# Patient Record
Sex: MALE | Race: WHITE | Employment: UNEMPLOYED | URBAN - METROPOLITAN AREA
[De-identification: names, ages, dates, MRNs, and addresses within clinical notes are randomized per-mention and may not be internally consistent; named-entity substitution may affect disease eponyms.]

---

## 2017-05-17 ENCOUNTER — HOSPITAL ENCOUNTER (EMERGENCY)
Age: 32
Discharge: HOME | End: 2017-05-18
Attending: EMERGENCY MEDICINE
Payer: MEDICAID

## 2017-05-17 ENCOUNTER — APPOINTMENT (OUTPATIENT)
Dept: CT IMAGING | Age: 32
End: 2017-05-17
Attending: EMERGENCY MEDICINE
Payer: MEDICAID

## 2017-05-17 DIAGNOSIS — N20.0 KIDNEY STONE: ICD-10-CM

## 2017-05-17 DIAGNOSIS — R10.9 LEFT FLANK PAIN: Primary | ICD-10-CM

## 2017-05-17 LAB
BASOPHILS # BLD AUTO: 0 K/UL (ref 0–0.2)
BASOPHILS # BLD: 0 % (ref 0–2)
DIFFERENTIAL METHOD BLD: ABNORMAL
EOSINOPHIL # BLD: 0.2 K/UL (ref 0–0.8)
EOSINOPHIL NFR BLD: 3 % (ref 0.5–7.8)
ERYTHROCYTE [DISTWIDTH] IN BLOOD BY AUTOMATED COUNT: 14.5 % (ref 11.9–14.6)
HCT VFR BLD AUTO: 41 % (ref 41.1–50.3)
HGB BLD-MCNC: 13.6 G/DL (ref 13.6–17.2)
IMM GRANULOCYTES # BLD: 0 K/UL (ref 0–0.5)
IMM GRANULOCYTES NFR BLD AUTO: 0.4 % (ref 0–5)
LYMPHOCYTES # BLD AUTO: 35 % (ref 13–44)
LYMPHOCYTES # BLD: 2.8 K/UL (ref 0.5–4.6)
MCH RBC QN AUTO: 25.9 PG (ref 26.1–32.9)
MCHC RBC AUTO-ENTMCNC: 33.2 G/DL (ref 31.4–35)
MCV RBC AUTO: 78.1 FL (ref 79.6–97.8)
MONOCYTES # BLD: 0.5 K/UL (ref 0.1–1.3)
MONOCYTES NFR BLD AUTO: 6 % (ref 4–12)
NEUTS SEG # BLD: 4.4 K/UL (ref 1.7–8.2)
NEUTS SEG NFR BLD AUTO: 56 % (ref 43–78)
PLATELET # BLD AUTO: 263 K/UL (ref 150–450)
PMV BLD AUTO: 9.9 FL (ref 10.8–14.1)
RBC # BLD AUTO: 5.25 M/UL (ref 4.23–5.67)
WBC # BLD AUTO: 7.9 K/UL (ref 4.3–11.1)

## 2017-05-17 PROCEDURE — 99284 EMERGENCY DEPT VISIT MOD MDM: CPT | Performed by: EMERGENCY MEDICINE

## 2017-05-17 PROCEDURE — 81003 URINALYSIS AUTO W/O SCOPE: CPT | Performed by: EMERGENCY MEDICINE

## 2017-05-17 PROCEDURE — 96375 TX/PRO/DX INJ NEW DRUG ADDON: CPT | Performed by: EMERGENCY MEDICINE

## 2017-05-17 PROCEDURE — 85025 COMPLETE CBC W/AUTO DIFF WBC: CPT | Performed by: EMERGENCY MEDICINE

## 2017-05-17 PROCEDURE — 80053 COMPREHEN METABOLIC PANEL: CPT | Performed by: EMERGENCY MEDICINE

## 2017-05-17 PROCEDURE — 74011250636 HC RX REV CODE- 250/636: Performed by: EMERGENCY MEDICINE

## 2017-05-17 PROCEDURE — 96374 THER/PROPH/DIAG INJ IV PUSH: CPT | Performed by: EMERGENCY MEDICINE

## 2017-05-17 PROCEDURE — 83690 ASSAY OF LIPASE: CPT | Performed by: EMERGENCY MEDICINE

## 2017-05-17 PROCEDURE — 74176 CT ABD & PELVIS W/O CONTRAST: CPT

## 2017-05-17 RX ORDER — METFORMIN HYDROCHLORIDE EXTENDED-RELEASE TABLETS 1000 MG/1
TABLET, FILM COATED, EXTENDED RELEASE ORAL 2 TIMES DAILY
COMMUNITY
End: 2019-07-21

## 2017-05-17 RX ORDER — KETOROLAC TROMETHAMINE 30 MG/ML
30 INJECTION, SOLUTION INTRAMUSCULAR; INTRAVENOUS
Status: COMPLETED | OUTPATIENT
Start: 2017-05-17 | End: 2017-05-17

## 2017-05-17 RX ADMIN — KETOROLAC TROMETHAMINE 30 MG: 30 INJECTION, SOLUTION INTRAMUSCULAR; INTRAVENOUS at 23:08

## 2017-05-17 RX ADMIN — SODIUM CHLORIDE 1000 ML: 900 INJECTION, SOLUTION INTRAVENOUS at 21:58

## 2017-05-17 NOTE — LETTER
[facility] EMERGENCY DEPT 
[address] [place] [id number] 
[phone] Work/School Note Date: 5/17/2017 To Whom It May concern: 
 
Ric Jeong was seen and treated today in the emergency room by the following provider(s): 
Attending Provider: Tila Garvin MD. Ric Jeong {Return to school/sport/work:5/19/2017 Sincerely, Christiano Lopez RN

## 2017-05-18 VITALS
HEART RATE: 87 BPM | TEMPERATURE: 98.6 F | BODY MASS INDEX: 42.26 KG/M2 | WEIGHT: 312 LBS | OXYGEN SATURATION: 98 % | RESPIRATION RATE: 16 BRPM | HEIGHT: 72 IN | SYSTOLIC BLOOD PRESSURE: 138 MMHG | DIASTOLIC BLOOD PRESSURE: 70 MMHG

## 2017-05-18 LAB
ALBUMIN SERPL BCP-MCNC: 2.9 G/DL (ref 3.5–5)
ALBUMIN/GLOB SERPL: 0.7 {RATIO} (ref 1.2–3.5)
ALP SERPL-CCNC: 127 U/L (ref 50–136)
ALT SERPL-CCNC: 60 U/L (ref 12–65)
ANION GAP BLD CALC-SCNC: 5 MMOL/L (ref 7–16)
AST SERPL W P-5'-P-CCNC: 80 U/L (ref 15–37)
BILIRUB SERPL-MCNC: 0.4 MG/DL (ref 0.2–1.1)
BUN SERPL-MCNC: 14 MG/DL (ref 6–23)
CALCIUM SERPL-MCNC: 8.3 MG/DL (ref 8.3–10.4)
CHLORIDE SERPL-SCNC: 102 MMOL/L (ref 98–107)
CO2 SERPL-SCNC: 27 MMOL/L (ref 21–32)
CREAT SERPL-MCNC: 0.93 MG/DL (ref 0.8–1.5)
GLOBULIN SER CALC-MCNC: 4.3 G/DL (ref 2.3–3.5)
GLUCOSE SERPL-MCNC: 317 MG/DL (ref 65–100)
LIPASE SERPL-CCNC: 110 U/L (ref 73–393)
POTASSIUM SERPL-SCNC: 5.8 MMOL/L (ref 3.5–5.1)
PROT SERPL-MCNC: 7.2 G/DL (ref 6.3–8.2)
SODIUM SERPL-SCNC: 134 MMOL/L (ref 136–145)

## 2017-05-18 RX ORDER — TRAMADOL HYDROCHLORIDE 50 MG/1
50 TABLET ORAL
Qty: 15 TAB | Refills: 0 | Status: SHIPPED | OUTPATIENT
Start: 2017-05-18 | End: 2017-05-26

## 2017-05-18 NOTE — ED TRIAGE NOTES
Pt presents to ER for pain to L flank x 1 week that went away over the weekend. Pain has returned w/ radiation to L groin, Hx of renal stones.

## 2017-05-18 NOTE — DISCHARGE INSTRUCTIONS

## 2017-05-18 NOTE — ED PROVIDER NOTES
HPI Comments: Patient is a 25-year-old male with history of ureteral lithiasis presenting with left flank pain with radiation to his left groin. States he's had the pain off and on for about a week but is currently acutely worse the last 2 days. Describes the pain as colicky. No reports of fevers or dysuria but he does mention that his urine is somewhat dark. Patient reports some nausea but no vomiting, fevers, shortness of breath or chest pain. Past surgical history consistent of appendectomy. Patient states he's been taking ibuprofen and Aleve at home with minimal relief. Patient is a 33 y.o. male presenting with flank pain. The history is provided by the patient. No  was used. Flank Pain    Pertinent negatives include no fever, no headaches, no abdominal pain and no dysuria. Past Medical History:   Diagnosis Date    Arrhythmia     \" artery in heart goes into spams\"  no meds    Diabetes ([facility] [address]     Morbid obesity (HCC)     Nausea & vomiting     Renal stone        Past Surgical History:   Procedure Laterality Date    HX ADENOIDECTOMY  age15    HX APPENDECTOMY  1996    HX ORTHOPAEDIC      left knee x 3     HX TONSILLECTOMY  age 10         No family history on file. Social History     Social History    Marital status:      Spouse name: N/A    Number of children: N/A    Years of education: N/A     Occupational History    Not on file. Social History Main Topics    Smoking status: Never Smoker    Smokeless tobacco: Never Used    Alcohol use Yes      Comment: occasional    Drug use: No    Sexual activity: Not on file     Other Topics Concern    Not on file     Social History Narrative         ALLERGIES: Phenergan [promethazine]    Review of Systems   Constitutional: Negative for fatigue and fever. HENT: Negative for sore throat. Eyes: Negative for pain. Respiratory: Negative for cough, chest tightness and shortness of breath.     Cardiovascular: Negative for leg swelling. Gastrointestinal: Negative for abdominal pain. Genitourinary: Positive for flank pain. Negative for dysuria. Musculoskeletal: Negative for back pain. Neurological: Negative for syncope and headaches. Vitals:    05/17/17 2001   BP: 142/72   Pulse: 93   Resp: 18   Temp: 98.6 °F (37 °C)   SpO2: 97%   Weight: 141.5 kg (312 lb)   Height: 6' (1.829 m)            Physical Exam   Constitutional: He is oriented to person, place, and time. He appears well-developed and well-nourished. No distress. HENT:   Head: Normocephalic and atraumatic. Eyes: Conjunctivae and EOM are normal. Pupils are equal, round, and reactive to light. Neck: Normal range of motion. Neck supple. Cardiovascular: Normal rate, regular rhythm and normal heart sounds. Pulmonary/Chest: Effort normal and breath sounds normal. No respiratory distress. He has no wheezes. He has no rales. Abdominal: Soft. He exhibits no distension. There is tenderness. There is no rebound. Tenderness to percussion in the left flank region. Musculoskeletal: Normal range of motion. He exhibits no edema or tenderness. Neurological: He is alert and oriented to person, place, and time. Skin: Skin is warm and dry. No rash noted. He is not diaphoretic. Psychiatric: He has a normal mood and affect. His behavior is normal.   Vitals reviewed. MDM  Number of Diagnoses or Management Options  Kidney stone: new and does not require workup  Left flank pain: new and does not require workup  Diagnosis management comments: Patient reports improvement after Toradol. No vomiting here in the ED. CT demonstrates nno ureterolithiasis. Patient has an 8 mm stone in his left kidney. Will discharge patient to home. Strongly suggested a follow-up with her PCP as soon as possible. Return to the ED for any new issues.        Amount and/or Complexity of Data Reviewed  Clinical lab tests: ordered and reviewed (Results for orders placed or performed during the hospital encounter of 05/17/17  -CBC WITH AUTOMATED DIFF       Result                                            Value                         Ref Range                       WBC                                               7.9                           4.3 - 11.1 K/uL                 RBC                                               5.25                          4.23 - 5.67 M/uL                HGB                                               13.6                          13.6 - 17.2 g/dL                HCT                                               41.0 (L)                      41.1 - 50.3 %                   MCV                                               78.1 (L)                      79.6 - 97.8 FL                  MCH                                               25.9 (L)                      26.1 - 32.9 PG                  MCHC                                              33.2                          31.4 - 35.0 g/dL                RDW                                               14.5                          11.9 - 14.6 %                   PLATELET                                          263                           150 - 450 K/uL                  MPV                                               9.9 (L)                       10.8 - 14.1 FL                  DF                                                AUTOMATED                                                     NEUTROPHILS                                       56                            43 - 78 %                       LYMPHOCYTES                                       35                            13 - 44 %                       MONOCYTES                                         6                             4.0 - 12.0 %                    EOSINOPHILS                                       3                             0.5 - 7.8 %                     BASOPHILS                                         0 0.0 - 2.0 %                     IMMATURE GRANULOCYTES                             0.4                           0.0 - 5.0 %                     ABS. NEUTROPHILS                                  4.4                           1.7 - 8.2 K/UL                  ABS. LYMPHOCYTES                                  2.8                           0.5 - 4.6 K/UL                  ABS. MONOCYTES                                    0.5                           0.1 - 1.3 K/UL                  ABS. EOSINOPHILS                                  0.2                           0.0 - 0.8 K/UL                  ABS. BASOPHILS                                    0.0                           0.0 - 0.2 K/UL                  ABS. IMM.  GRANS.                                  0.0                           0.0 - 0.5 K/UL             -LIPASE       Result                                            Value                         Ref Range                       Lipase                                            110                           73 - 393 U/L               -METABOLIC PANEL, COMPREHENSIVE       Result                                            Value                         Ref Range                       Sodium                                            134 (L)                       136 - 145 mmol/L                Potassium                                         5.8 (H)                       3.5 - 5.1 mmol/L                Chloride                                          102                           98 - 107 mmol/L                 CO2                                               27                            21 - 32 mmol/L                  Anion gap                                         5 (L)                         7 - 16 mmol/L                   Glucose                                           317 (H)                       65 - 100 mg/dL                  BUN                                               14 6 - 23 MG/DL                    Creatinine                                        0.93                          0.8 - 1.5 MG/DL                 GFR est AA                                        >60                           >60 ml/min/1.73m2               GFR est non-AA                                    >60                           >60 ml/min/1.73m2               Calcium                                           8.3                           8.3 - 10.4 MG/DL                Bilirubin, total                                  0.4                           0.2 - 1.1 MG/DL                 ALT (SGPT)                                        60                            12 - 65 U/L                     AST (SGOT)                                        80 (H)                        15 - 37 U/L                     Alk. phosphatase                                  127                           50 - 136 U/L                    Protein, total                                    7.2                           6.3 - 8.2 g/dL                  Albumin                                           2.9 (L)                       3.5 - 5.0 g/dL                  Globulin                                          4.3 (H)                       2.3 - 3.5 g/dL                  A-G Ratio                                         0.7 (L)                       1.2 - 3.5                  )  Tests in the radiology section of CPT®: ordered and reviewed (Ct Urogram Wo Cont    Result Date: 5/17/2017  Axial CT stone Exam: CT abdomen pelvis without contrast. The mA was adjusted using dose modulation to reduce patient radiation exposure. REASON: Left flank pain for one week, acutely worse today COMPARISON: CT abdomen pelvis from March 29, 2015 FINDINGS: CT abdomen: 8 mm nonobstructing stone in the mid left kidney. No right renal or bilateral ureteral calculi. No hydronephrosis or asymmetric perinephric stranding.  The lack of IV contrast limits the evaluation of solid abdominal organs, hollow viscera, and vascular structures. No gross abnormalities of solid abdominal organs. Abdominal aorta is nonaneurysmal. No abnormally dilated loops of bowel. CT pelvis: The urinary bladder contour is within normal limits. No free pelvic fluid. No acute osseous abnormality. IMPRESSION: 8 mm nonobstructing left renal stone.  No ureteral calculi or hydronephrosis.     )  Review and summarize past medical records: yes  Independent visualization of images, tracings, or specimens: yes    Risk of Complications, Morbidity, and/or Mortality  Presenting problems: moderate  Diagnostic procedures: moderate  Management options: moderate    Patient Progress  Patient progress: improved    ED Course       Procedures

## 2017-06-12 ENCOUNTER — HOSPITAL ENCOUNTER (OUTPATIENT)
Dept: CT IMAGING | Age: 32
Discharge: HOME | End: 2017-06-12
Attending: UROLOGY
Payer: COMMERCIAL

## 2017-06-12 ENCOUNTER — HOSPITAL ENCOUNTER (OUTPATIENT)
Dept: CT IMAGING | Age: 32
Discharge: HOME | End: 2017-06-12
Attending: UROLOGY

## 2017-06-12 DIAGNOSIS — N20.0 KIDNEY STONES: ICD-10-CM

## 2017-06-12 DIAGNOSIS — R10.9 LT FLANK PAIN: ICD-10-CM

## 2017-06-12 PROCEDURE — 74176 CT ABD & PELVIS W/O CONTRAST: CPT

## 2017-06-12 NOTE — PROGRESS NOTES
Please let pt know that stone has not moved. No clear  etiology for pain. If he would like to proceed with surgery let me know.

## 2017-06-13 ENCOUNTER — ANESTHESIA EVENT (OUTPATIENT)
Dept: SURGERY | Age: 32
End: 2017-06-13
Payer: COMMERCIAL

## 2017-06-13 ENCOUNTER — HOSPITAL ENCOUNTER (OUTPATIENT)
Dept: LAB | Age: 32
Discharge: HOME | End: 2017-06-13
Payer: COMMERCIAL

## 2017-06-13 LAB
ANION GAP BLD CALC-SCNC: 7 MMOL/L (ref 7–16)
APPEARANCE UR: CLEAR
BACTERIA URNS QL MICRO: 0 /HPF
BILIRUB UR QL: NEGATIVE
BUN SERPL-MCNC: 12 MG/DL (ref 6–23)
CALCIUM SERPL-MCNC: 9.2 MG/DL (ref 8.3–10.4)
CASTS URNS QL MICRO: ABNORMAL /LPF
CHLORIDE SERPL-SCNC: 96 MMOL/L (ref 98–107)
CO2 SERPL-SCNC: 28 MMOL/L (ref 21–32)
COLOR UR: YELLOW
CREAT SERPL-MCNC: 1.2 MG/DL (ref 0.8–1.5)
EPI CELLS #/AREA URNS HPF: ABNORMAL /HPF
ERYTHROCYTE [DISTWIDTH] IN BLOOD BY AUTOMATED COUNT: 14.2 % (ref 11.9–14.6)
GLUCOSE SERPL-MCNC: 546 MG/DL (ref 65–100)
GLUCOSE UR STRIP.AUTO-MCNC: >1000 MG/DL
HCT VFR BLD AUTO: 44 % (ref 41.1–50.3)
HGB BLD-MCNC: 14.8 G/DL (ref 13.6–17.2)
HGB UR QL STRIP: ABNORMAL
KETONES UR QL STRIP.AUTO: NEGATIVE MG/DL
LEUKOCYTE ESTERASE UR QL STRIP.AUTO: NEGATIVE
MCH RBC QN AUTO: 25.9 PG (ref 26.1–32.9)
MCHC RBC AUTO-ENTMCNC: 33.6 G/DL (ref 31.4–35)
MCV RBC AUTO: 77.1 FL (ref 79.6–97.8)
NITRITE UR QL STRIP.AUTO: NEGATIVE
PH UR STRIP: 5 [PH] (ref 5–9)
PLATELET # BLD AUTO: 250 K/UL (ref 150–450)
PMV BLD AUTO: 10.1 FL (ref 10.8–14.1)
POTASSIUM SERPL-SCNC: 4.2 MMOL/L (ref 3.5–5.1)
PROT UR STRIP-MCNC: NEGATIVE MG/DL
RBC # BLD AUTO: 5.71 M/UL (ref 4.23–5.67)
RBC #/AREA URNS HPF: ABNORMAL /HPF
SODIUM SERPL-SCNC: 131 MMOL/L (ref 136–145)
SP GR UR REFRACTOMETRY: 1.04 (ref 1–1.02)
UROBILINOGEN UR QL STRIP.AUTO: 0.2 EU/DL (ref 0.2–1)
WBC # BLD AUTO: 8 K/UL (ref 4.3–11.1)
WBC URNS QL MICRO: ABNORMAL /HPF

## 2017-06-13 PROCEDURE — 85027 COMPLETE CBC AUTOMATED: CPT | Performed by: UROLOGY

## 2017-06-13 PROCEDURE — 80048 BASIC METABOLIC PNL TOTAL CA: CPT | Performed by: UROLOGY

## 2017-06-13 PROCEDURE — 81001 URINALYSIS AUTO W/SCOPE: CPT | Performed by: UROLOGY

## 2017-06-13 PROCEDURE — 87086 URINE CULTURE/COLONY COUNT: CPT | Performed by: UROLOGY

## 2017-06-13 PROCEDURE — 36415 COLL VENOUS BLD VENIPUNCTURE: CPT | Performed by: UROLOGY

## 2017-06-14 ENCOUNTER — HOSPITAL ENCOUNTER (OUTPATIENT)
Age: 32
Setting detail: OUTPATIENT SURGERY
Discharge: HOME | End: 2017-06-14
Attending: UROLOGY | Admitting: UROLOGY
Payer: COMMERCIAL

## 2017-06-14 ENCOUNTER — ANESTHESIA (OUTPATIENT)
Dept: SURGERY | Age: 32
End: 2017-06-14
Payer: COMMERCIAL

## 2017-06-14 VITALS
HEART RATE: 67 BPM | HEIGHT: 72 IN | BODY MASS INDEX: 41.08 KG/M2 | WEIGHT: 303.31 LBS | OXYGEN SATURATION: 91 % | TEMPERATURE: 97.8 F | SYSTOLIC BLOOD PRESSURE: 140 MMHG | DIASTOLIC BLOOD PRESSURE: 82 MMHG | RESPIRATION RATE: 16 BRPM

## 2017-06-14 LAB
GLUCOSE BLD STRIP.AUTO-MCNC: 216 MG/DL (ref 65–100)
GLUCOSE BLD STRIP.AUTO-MCNC: 241 MG/DL (ref 65–100)

## 2017-06-14 PROCEDURE — 77030008477 HC STYL SATN SLP COVD -A: Performed by: ANESTHESIOLOGY

## 2017-06-14 PROCEDURE — 74011250636 HC RX REV CODE- 250/636

## 2017-06-14 PROCEDURE — 74011000250 HC RX REV CODE- 250

## 2017-06-14 PROCEDURE — 74011636320 HC RX REV CODE- 636/320: Performed by: UROLOGY

## 2017-06-14 PROCEDURE — 74011000250 HC RX REV CODE- 250: Performed by: ANESTHESIOLOGY

## 2017-06-14 PROCEDURE — 77030020143 HC AIRWY LARYN INTUB CGAS -A: Performed by: ANESTHESIOLOGY

## 2017-06-14 PROCEDURE — 74011250637 HC RX REV CODE- 250/637: Performed by: ANESTHESIOLOGY

## 2017-06-14 PROCEDURE — 74011250636 HC RX REV CODE- 250/636: Performed by: ANESTHESIOLOGY

## 2017-06-14 PROCEDURE — 77030035011 HC LSR FBR HOLM FLXIVA TRAC TIP BSC -F: Performed by: UROLOGY

## 2017-06-14 PROCEDURE — 82962 GLUCOSE BLOOD TEST: CPT

## 2017-06-14 PROCEDURE — 76210000026 HC REC RM PH II 1 TO 1.5 HR: Performed by: UROLOGY

## 2017-06-14 PROCEDURE — 76210000017 HC OR PH I REC 1.5 TO 2 HR: Performed by: UROLOGY

## 2017-06-14 PROCEDURE — 74011250636 HC RX REV CODE- 250/636: Performed by: UROLOGY

## 2017-06-14 PROCEDURE — C2617 STENT, NON-COR, TEM W/O DEL: HCPCS | Performed by: UROLOGY

## 2017-06-14 PROCEDURE — 77030032490 HC SLV COMPR SCD KNE COVD -B: Performed by: UROLOGY

## 2017-06-14 PROCEDURE — C1769 GUIDE WIRE: HCPCS | Performed by: UROLOGY

## 2017-06-14 PROCEDURE — C1894 INTRO/SHEATH, NON-LASER: HCPCS | Performed by: UROLOGY

## 2017-06-14 PROCEDURE — 77030018836 HC SOL IRR NACL ICUM -A: Performed by: UROLOGY

## 2017-06-14 PROCEDURE — 77030020782 HC GWN BAIR PAWS FLX 3M -B: Performed by: ANESTHESIOLOGY

## 2017-06-14 PROCEDURE — 77030018846 HC SOL IRR STRL H20 ICUM -A: Performed by: UROLOGY

## 2017-06-14 PROCEDURE — 77030009934 HC PRB IRR KT UTMD -B: Performed by: UROLOGY

## 2017-06-14 PROCEDURE — 76060000033 HC ANESTHESIA 1 TO 1.5 HR: Performed by: UROLOGY

## 2017-06-14 PROCEDURE — 74011636637 HC RX REV CODE- 636/637: Performed by: ANESTHESIOLOGY

## 2017-06-14 PROCEDURE — 77030008703 HC TU ET UNCUF COVD -A: Performed by: ANESTHESIOLOGY

## 2017-06-14 PROCEDURE — 76010000138 HC OR TIME 0.5 TO 1 HR: Performed by: UROLOGY

## 2017-06-14 PROCEDURE — 77030019927 HC TBNG IRR CYSTO BAXT -A: Performed by: UROLOGY

## 2017-06-14 DEVICE — URETERAL STENT
Type: IMPLANTABLE DEVICE | Site: KIDNEY | Status: FUNCTIONAL
Brand: PERCUFLEX™ PLUS

## 2017-06-14 RX ORDER — SUCCINYLCHOLINE CHLORIDE 20 MG/ML
INJECTION INTRAMUSCULAR; INTRAVENOUS AS NEEDED
Status: DISCONTINUED | OUTPATIENT
Start: 2017-06-14 | End: 2017-06-14 | Stop reason: HOSPADM

## 2017-06-14 RX ORDER — LIDOCAINE HYDROCHLORIDE 10 MG/ML
0.1 INJECTION INFILTRATION; PERINEURAL AS NEEDED
Status: DISCONTINUED | OUTPATIENT
Start: 2017-06-14 | End: 2017-06-14 | Stop reason: HOSPADM

## 2017-06-14 RX ORDER — ROCURONIUM BROMIDE 10 MG/ML
INJECTION, SOLUTION INTRAVENOUS AS NEEDED
Status: DISCONTINUED | OUTPATIENT
Start: 2017-06-14 | End: 2017-06-14 | Stop reason: HOSPADM

## 2017-06-14 RX ORDER — SODIUM CHLORIDE 0.9 % (FLUSH) 0.9 %
5-10 SYRINGE (ML) INJECTION AS NEEDED
Status: DISCONTINUED | OUTPATIENT
Start: 2017-06-14 | End: 2017-06-14 | Stop reason: HOSPADM

## 2017-06-14 RX ORDER — HYDROMORPHONE HYDROCHLORIDE 2 MG/ML
0.5 INJECTION, SOLUTION INTRAMUSCULAR; INTRAVENOUS; SUBCUTANEOUS
Status: DISCONTINUED | OUTPATIENT
Start: 2017-06-14 | End: 2017-06-14 | Stop reason: HOSPADM

## 2017-06-14 RX ORDER — LIDOCAINE HYDROCHLORIDE 20 MG/ML
INJECTION, SOLUTION EPIDURAL; INFILTRATION; INTRACAUDAL; PERINEURAL AS NEEDED
Status: DISCONTINUED | OUTPATIENT
Start: 2017-06-14 | End: 2017-06-14 | Stop reason: HOSPADM

## 2017-06-14 RX ORDER — SODIUM CHLORIDE, SODIUM LACTATE, POTASSIUM CHLORIDE, CALCIUM CHLORIDE 600; 310; 30; 20 MG/100ML; MG/100ML; MG/100ML; MG/100ML
100 INJECTION, SOLUTION INTRAVENOUS CONTINUOUS
Status: DISCONTINUED | OUTPATIENT
Start: 2017-06-14 | End: 2017-06-14 | Stop reason: HOSPADM

## 2017-06-14 RX ORDER — ONDANSETRON 2 MG/ML
INJECTION INTRAMUSCULAR; INTRAVENOUS AS NEEDED
Status: DISCONTINUED | OUTPATIENT
Start: 2017-06-14 | End: 2017-06-14 | Stop reason: HOSPADM

## 2017-06-14 RX ORDER — NALOXONE HYDROCHLORIDE 0.4 MG/ML
0.1 INJECTION, SOLUTION INTRAMUSCULAR; INTRAVENOUS; SUBCUTANEOUS
Status: DISCONTINUED | OUTPATIENT
Start: 2017-06-14 | End: 2017-06-14 | Stop reason: HOSPADM

## 2017-06-14 RX ORDER — HYDROCODONE BITARTRATE AND ACETAMINOPHEN 7.5; 325 MG/1; MG/1
1 TABLET ORAL
Qty: 20 TAB | Refills: 0 | Status: SHIPPED | OUTPATIENT
Start: 2017-06-14 | End: 2017-06-23

## 2017-06-14 RX ORDER — INSULIN LISPRO 100 [IU]/ML
2 INJECTION, SOLUTION INTRAVENOUS; SUBCUTANEOUS ONCE
Status: COMPLETED | OUTPATIENT
Start: 2017-06-14 | End: 2017-06-14

## 2017-06-14 RX ORDER — MIDAZOLAM HYDROCHLORIDE 1 MG/ML
2 INJECTION, SOLUTION INTRAMUSCULAR; INTRAVENOUS
Status: DISCONTINUED | OUTPATIENT
Start: 2017-06-14 | End: 2017-06-14 | Stop reason: HOSPADM

## 2017-06-14 RX ORDER — FENTANYL CITRATE 50 UG/ML
INJECTION, SOLUTION INTRAMUSCULAR; INTRAVENOUS AS NEEDED
Status: DISCONTINUED | OUTPATIENT
Start: 2017-06-14 | End: 2017-06-14 | Stop reason: HOSPADM

## 2017-06-14 RX ORDER — ONDANSETRON 2 MG/ML
4 INJECTION INTRAMUSCULAR; INTRAVENOUS
Status: DISCONTINUED | OUTPATIENT
Start: 2017-06-14 | End: 2017-06-14 | Stop reason: HOSPADM

## 2017-06-14 RX ORDER — PHENAZOPYRIDINE HYDROCHLORIDE 95 MG/1
190 TABLET ORAL ONCE
Status: COMPLETED | OUTPATIENT
Start: 2017-06-14 | End: 2017-06-14

## 2017-06-14 RX ORDER — DEXAMETHASONE SODIUM PHOSPHATE 4 MG/ML
INJECTION, SOLUTION INTRA-ARTICULAR; INTRALESIONAL; INTRAMUSCULAR; INTRAVENOUS; SOFT TISSUE AS NEEDED
Status: DISCONTINUED | OUTPATIENT
Start: 2017-06-14 | End: 2017-06-14 | Stop reason: HOSPADM

## 2017-06-14 RX ORDER — SODIUM CHLORIDE 0.9 % (FLUSH) 0.9 %
5-10 SYRINGE (ML) INJECTION EVERY 8 HOURS
Status: DISCONTINUED | OUTPATIENT
Start: 2017-06-14 | End: 2017-06-14 | Stop reason: HOSPADM

## 2017-06-14 RX ORDER — NALBUPHINE HYDROCHLORIDE 20 MG/ML
5 INJECTION, SOLUTION INTRAMUSCULAR; INTRAVENOUS; SUBCUTANEOUS
Status: DISCONTINUED | OUTPATIENT
Start: 2017-06-14 | End: 2017-06-14 | Stop reason: HOSPADM

## 2017-06-14 RX ORDER — KETOROLAC TROMETHAMINE 30 MG/ML
30 INJECTION, SOLUTION INTRAMUSCULAR; INTRAVENOUS ONCE
Status: COMPLETED | OUTPATIENT
Start: 2017-06-14 | End: 2017-06-14

## 2017-06-14 RX ORDER — PROPOFOL 10 MG/ML
INJECTION, EMULSION INTRAVENOUS AS NEEDED
Status: DISCONTINUED | OUTPATIENT
Start: 2017-06-14 | End: 2017-06-14 | Stop reason: HOSPADM

## 2017-06-14 RX ORDER — OXYCODONE HYDROCHLORIDE 5 MG/1
5 TABLET ORAL
Status: COMPLETED | OUTPATIENT
Start: 2017-06-14 | End: 2017-06-14

## 2017-06-14 RX ORDER — CIPROFLOXACIN 2 MG/ML
400 INJECTION, SOLUTION INTRAVENOUS ONCE
Status: COMPLETED | OUTPATIENT
Start: 2017-06-14 | End: 2017-06-14

## 2017-06-14 RX ORDER — CIPROFLOXACIN 500 MG/1
500 TABLET ORAL 2 TIMES DAILY
Qty: 6 TAB | Refills: 0 | Status: SHIPPED | OUTPATIENT
Start: 2017-06-14 | End: 2017-06-17

## 2017-06-14 RX ORDER — FLUMAZENIL 0.1 MG/ML
0.2 INJECTION INTRAVENOUS
Status: DISCONTINUED | OUTPATIENT
Start: 2017-06-14 | End: 2017-06-14 | Stop reason: HOSPADM

## 2017-06-14 RX ADMIN — HYDROMORPHONE HYDROCHLORIDE 0.5 MG: 2 INJECTION, SOLUTION INTRAMUSCULAR; INTRAVENOUS; SUBCUTANEOUS at 14:23

## 2017-06-14 RX ADMIN — FENTANYL CITRATE 50 MCG: 50 INJECTION, SOLUTION INTRAMUSCULAR; INTRAVENOUS at 13:51

## 2017-06-14 RX ADMIN — SODIUM CHLORIDE, SODIUM LACTATE, POTASSIUM CHLORIDE, AND CALCIUM CHLORIDE: 600; 310; 30; 20 INJECTION, SOLUTION INTRAVENOUS at 13:00

## 2017-06-14 RX ADMIN — SUCCINYLCHOLINE CHLORIDE 160 MG: 20 INJECTION INTRAMUSCULAR; INTRAVENOUS at 13:06

## 2017-06-14 RX ADMIN — ROCURONIUM BROMIDE 5 MG: 10 INJECTION, SOLUTION INTRAVENOUS at 13:06

## 2017-06-14 RX ADMIN — CIPROFLOXACIN 400 MG: 2 INJECTION, SOLUTION INTRAVENOUS at 13:10

## 2017-06-14 RX ADMIN — OXYCODONE HYDROCHLORIDE 5 MG: 5 TABLET ORAL at 15:55

## 2017-06-14 RX ADMIN — SODIUM CHLORIDE, SODIUM LACTATE, POTASSIUM CHLORIDE, AND CALCIUM CHLORIDE 100 ML/HR: 600; 310; 30; 20 INJECTION, SOLUTION INTRAVENOUS at 11:23

## 2017-06-14 RX ADMIN — MIDAZOLAM HYDROCHLORIDE 2 MG: 1 INJECTION, SOLUTION INTRAMUSCULAR; INTRAVENOUS at 12:35

## 2017-06-14 RX ADMIN — DEXAMETHASONE SODIUM PHOSPHATE 4 MG: 4 INJECTION, SOLUTION INTRA-ARTICULAR; INTRALESIONAL; INTRAMUSCULAR; INTRAVENOUS; SOFT TISSUE at 13:16

## 2017-06-14 RX ADMIN — HYDROMORPHONE HYDROCHLORIDE 0.5 MG: 2 INJECTION, SOLUTION INTRAMUSCULAR; INTRAVENOUS; SUBCUTANEOUS at 14:32

## 2017-06-14 RX ADMIN — KETOROLAC TROMETHAMINE 30 MG: 30 INJECTION, SOLUTION INTRAMUSCULAR at 16:14

## 2017-06-14 RX ADMIN — URINARY PAIN RELIEF 190 MG: 95 TABLET ORAL at 14:54

## 2017-06-14 RX ADMIN — FAMOTIDINE 20 MG: 10 INJECTION, SOLUTION INTRAVENOUS at 11:35

## 2017-06-14 RX ADMIN — ONDANSETRON 4 MG: 2 INJECTION INTRAMUSCULAR; INTRAVENOUS at 13:40

## 2017-06-14 RX ADMIN — PROPOFOL 200 MG: 10 INJECTION, EMULSION INTRAVENOUS at 13:06

## 2017-06-14 RX ADMIN — INSULIN LISPRO 2 UNITS: 100 INJECTION, SOLUTION INTRAVENOUS; SUBCUTANEOUS at 11:32

## 2017-06-14 RX ADMIN — FENTANYL CITRATE 100 MCG: 50 INJECTION, SOLUTION INTRAMUSCULAR; INTRAVENOUS at 13:06

## 2017-06-14 RX ADMIN — FENTANYL CITRATE 50 MCG: 50 INJECTION, SOLUTION INTRAMUSCULAR; INTRAVENOUS at 13:33

## 2017-06-14 RX ADMIN — HYDROMORPHONE HYDROCHLORIDE 0.5 MG: 2 INJECTION, SOLUTION INTRAMUSCULAR; INTRAVENOUS; SUBCUTANEOUS at 14:42

## 2017-06-14 RX ADMIN — LIDOCAINE HYDROCHLORIDE 60 MG: 20 INJECTION, SOLUTION EPIDURAL; INFILTRATION; INTRACAUDAL; PERINEURAL at 13:06

## 2017-06-14 RX ADMIN — INSULIN HUMAN 2 UNITS: 100 INJECTION, SOLUTION PARENTERAL at 11:32

## 2017-06-14 NOTE — BRIEF OP NOTE
BRIEF OPERATIVE NOTE    Date of Procedure: 6/14/2017   Preoperative Diagnosis: L renal stone  Postoperative Diagnosis: L renal stones  Procedure(s):  CYSTOSCOPY LEFT URETEROSCOPY/LASER LITHO/STENT/LEFT RPG  Surgeon(s) and Role:     * James Monge DO - Primary         Assistant Staff:       Surgical Staff:  Circ-1: Moshe Brink RN  Event Time In   Incision Start 1320   Incision Close 1345     Anesthesia: General   Estimated Blood Loss: Nil  Specimens: * No specimens in log *   Findings: see op note  Complications: none immediate  Implants: * No implants in log *

## 2017-06-14 NOTE — OP NOTES
[facility]   OPERATIVE REPORT       Name:  Ana Shetty   MR#:  [id number]   :  1985   Account #:  [de-identified]   Date of Adm:  2017       PREOPERATIVE DIAGNOSES: Left renal stone and left flank pain. POSTOPERATIVE DIAGNOSES: Left renal stones and left flank pain. PROCEDURE:   1. Cystoscopy. 2. Left ureteroscopy. 3. Laser lithotripsy. 4. Left retrograde pyelogram.   5. Left ureteral stent placement. SURGEON: Jennifer Ibrahim DO    ANESTHESIA: General.      CLINICAL HISTORY: This is a 29-year-old gentleman who reports a   recent history of left flank pain with radiation to the left   groin. A CT on 2017 showed an 8 mm left mid pole stone. He   recently called the office IN recurrent pain and a CT scan was   repeated showing similar findings. He has elected to proceed   with the above-mentioned procedure despite the fact that I could   not guarantee resolution of his pain. All risks, benefits, and   alternatives to this procedure have been discussed and he has   elected to proceed at this time. DESCRIPTION OF OPERATIVE PROCEDURE: Patient consent was   obtained. The patient was brought back to the operating room at   which time he was placed in the supine position. After the   uneventful induction of general anesthesia, he was then placed   in a dorsal lithotomy position. His genital area was prepped and   draped and a sterile field applied. A 22-Maur[place] cystoscope was   inserted into urethra and advanced into the bladder under direct   visualization. The urethra was unremarkable. The bladder was   systematically surveyed. No abnormalities were seen. Single   bilateral ureteral orifices were seen in their normal orthotopic   position. A guidewire was passed up the left collecting system   without difficulty. The cystoscope was then removed and the   ureter was dilated using 8/10 coaxial sheath dilator.  A flexible   ureteroscope was then passed over the wire up to the level of   the kidney. The wire was removed and the kidney was surveyed. I   identified 2 approximately 4 mm enlarged Kermit plaques in a   left mid pole rex. These were fragmented nicely using a 200   micron holmium laser lithotripsy fiber. I again surveyed the   kidney. No other stones or abnormalities were seen. A retrograde   pyelogram was then performed through the scope outlining the   left collecting system. No filling defects or hydronephrosis was   seen. No extravasation of contrast was noted. The ureter was   then closely examined by removing the ureteroscope in a   retrograde fashion, no abnormalities or stones were seen within   the ureter. The wire was then replaced and the scope was   completely removed, a 7 x 26 double-J ureteral stent was then   passed under cystoscopic and fluoroscopic guidance. Excellent   curl was noted proximally as well as distally. The patient's   bladder was drained and the procedure was terminated. The   patient tolerated the procedure well. I will plan to remove his   stent in the office in approximately 1 week's time. INTERPRETATION OF LEFT RETROGRADE PYELOGRAM: A left retrograde   pyelogram was performed through the ureteroscope outlining the   left collecting system. No hydronephrosis or filling defects   were seen. No extravasation of contrast was noted.         DO ROSARIO Hernandez / Shanda Stewart   D:  06/14/2017   13:59   T:  06/14/2017   14:15   Job #:  [id number]

## 2017-06-14 NOTE — ANESTHESIA POSTPROCEDURE EVALUATION
Post-Anesthesia Evaluation and Assessment    Patient: Yunior Noriega MRN: [id number]  SSN: [id number]    YOB: 1985  Age: 33 y.o. Sex: male       Cardiovascular Function/Vital Signs  Visit Vitals    /82    Pulse 67    Temp 36.4 °C (97.5 °F)    Resp 16    Ht 6' (1.829 m)    Wt 137.6 kg (303 lb 5 oz)    SpO2 91%    BMI 41.14 kg/m2       Patient is status post general anesthesia for Procedure(s):  CYSTOSCOPY LEFT URETEROSCOPY/LASER LITHO/STENT. Nausea/Vomiting: None    Postoperative hydration reviewed and adequate. Pain:  Pain Scale 1: Numeric (0 - 10) (06/14/17 1440)  Pain Intensity 1: 8 (06/14/17 1440)   Managed    Neurological Status:   Neuro (WDL): Within Defined Limits (06/14/17 1400)  Neuro  LUE Motor Response: Purposeful (06/14/17 1400)  LLE Motor Response: Purposeful (06/14/17 1400)  RUE Motor Response: Purposeful (06/14/17 1400)  RLE Motor Response: Purposeful (06/14/17 1400)   At baseline    Mental Status and Level of Consciousness: Arousable    Pulmonary Status:   O2 Device: Nasal cannula (06/14/17 1400)   Adequate oxygenation and airway patent    Complications related to anesthesia: None    Post-anesthesia assessment completed.  No concerns    Signed By: Robel Braun MD     June 14, 2017

## 2017-06-14 NOTE — ANESTHESIA PREPROCEDURE EVALUATION
Anesthetic History     PONV          Review of Systems / Medical History  Patient summary reviewed and pertinent labs reviewed    Pulmonary        Sleep apnea: No treatment           Neuro/Psych   Within defined limits           Cardiovascular    Hypertension: well controlled              Exercise tolerance: >4 METS     GI/Hepatic/Renal  Within defined limits              Endo/Other    Diabetes (last HgB A1c f 14.3 in 4/2017): poorly controlled, type 2, using insulin    Morbid obesity     Other Findings            Physical Exam    Airway  Mallampati: III  TM Distance: 4 - 6 cm  Neck ROM: normal range of motion   Mouth opening: Normal     Cardiovascular  Regular rate and rhythm,  S1 and S2 normal,  no murmur, click, rub, or gallop             Dental  No notable dental hx       Pulmonary  Breath sounds clear to auscultation               Abdominal  GI exam deferred       Other Findings            Anesthetic Plan    ASA: 3  Anesthesia type: general          Induction: Intravenous and RSI  Anesthetic plan and risks discussed with: Patient

## 2017-06-14 NOTE — IP AVS SNAPSHOT
Elena Manrique 
 
 
 [address] 
[phone] Patient: Luis Gilford MRN: [id number] :[id number] You are allergic to the following Allergen Reactions Phenergan (Promethazine) Anaphylaxis Makes me stop breathing\" Recent Documentation Height Weight BMI Smoking Status 1.829 m 137.6 kg 41.14 kg/m2 Never Smoker Emergency Contacts Name Discharge Info Relation Home Work Mobile [address]  Spouse [3] [phone] About your hospitalization You were admitted on:  2017 You last received care in the:  [facility] SURGERY You were discharged on:  2017 Unit phone number:  [phone] Why you were hospitalized Your primary diagnosis was:  Not on File Providers Seen During Your Hospitalizations Provider Role Specialty Primary office phone Jimmy Car DO Attending Provider Urology [phone] Your Primary Care Physician (PCP) Primary Care Physician Office Phone Office Fax NONE ** None ** ** None ** Follow-up Information Follow up With Details Comments Contact Info None   None ([phone]) Patient stated that they have no PCP Melissa Erickson DO Go on 2017 follow up at 9 AM at the [place] [address] 
[phone] Your Appointments 2017  9:00 AM EDT CYSTOSCOPY with Jimmy Car DO [facility] Urology HT ([facility] UROLOGY) [address] [place] [address] Rebeka Jimenez  
[phone] Current Discharge Medication List  
  
START taking these medications Dose & Instructions Dispensing Information Comments Morning Noon Evening Bedtime  
 ciprofloxacin HCl 500 mg tablet Commonly known as:  CIPRO Your last dose was:     
   
Your next dose is:    
   
   
 Dose:  500 mg  
 Take 1 Tab by mouth two (2) times a day for 3 days. Quantity:  6 Tab Refills:  0 HYDROcodone-acetaminophen 7.5-325 mg per tablet Commonly known as:  Neville Griffin Your last dose was: Your next dose is:    
   
   
 Dose:  1 Tab Take 1 Tab by mouth every six (6) hours as needed for Pain. Max Daily Amount: 4 Tabs. Quantity:  20 Tab Refills:  0 CONTINUE these medications which have NOT CHANGED Dose & Instructions Dispensing Information Comments Morning Noon Evening Bedtime  
 metFORMIN ER 1,000 mg Tr24 Your last dose was: Your next dose is: Take  by mouth two (2) times a day. Refills:  0 Where to Get Your Medications Information on where to get these meds will be given to you by the nurse or doctor. ! Ask your nurse or doctor about these medications  
  ciprofloxacin HCl 500 mg tablet HYDROcodone-acetaminophen 7.5-325 mg per tablet Discharge Instructions Ureteral Stent Placement: What to Expect at Home Your Recovery A ureteral (say \"you-REE-ter-ul\") stent is a thin, hollow tube that is placed in the ureter to help urine pass from the kidney into the bladder. Ureters are the tubes that connect the kidneys to the bladder. You may have a small amount of blood in your urine for 1 to 3 days after the procedure. While the stent is in place, you may have to urinate more often, feel a sudden need to urinate, or feel like you cannot completely empty your bladder. You may feel some pain when you urinate or do strenuous activity. You also may notice a small amount of blood in your urine after strenuous activities. These side effects usually do not prevent people from doing their normal daily activities. Your urethra is the tube that carries urine from your bladder to outside your body. This string is attached to the stent.  Try not to pull on the string. The doctor will use the string to pull out the stent when you no longer need it. After the procedure, urine may flow better from your kidneys to your bladder. A ureteral stent may be left in place for several days or for as long as several months. Your doctor will take it out when you no longer need it. This care sheet gives you a general idea about how long it will take for you to recover. But each person recovers at a different pace. Follow the steps below to get better as quickly as possible. Cystoscopy: What to Expect at [facility] Your Recovery A cystoscopy is a procedure that lets a doctor look inside of the bladder and the urethra. The urethra is the tube that carries urine from the bladder to outside the body. The doctor uses a thin, lighted tube called a cystoscope to look for kidney or bladder stones, tumors, bleeding, or infection. After the cystoscopy, your urethra may be sore at first, and it may burn when you urinate for the first few days after the procedure. You may feel the need to urinate more often, and your urine may be pink. These symptoms should get better in 1 or 2 days. You will probably be able to go back to work or most of your usual activities in 1 or 2 days. This care sheet gives you a general idea about how long it will take for you to recover. But each person recovers at a different pace. Follow the steps below to get better as quickly as possible. How can you care for yourself at home? Activity 1. Rest when you feel tired. Getting enough sleep will help you recover. 2. Try to walk each day. Start by walking a little more than you did the day before. Bit by bit, increase the amount you walk. Walking boosts blood flow and helps prevent pneumonia and constipation. 3. Avoid strenuous activities, such as bicycle riding, jogging, weight lifting, or aerobic exercise, until your doctor says it is okay. 4. Ask your doctor when you can drive again. 5. Most people are able to return to work within 1 or 2 days after the procedure. 6. You may shower and take baths as usual. 
7. Ask your doctor when it is okay for you to have sex. Diet · You can eat your normal diet. If your stomach is upset, try bland, low-fat foods like plain rice, broiled chicken, toast, and yogurt. · Drink plenty of fluids (unless your doctor tells you not to). Medicines · Take pain medicines exactly as directed. ¨ If the doctor gave you a prescription medicine for pain, take it as prescribed. ¨ If you are not taking a prescription pain medicine, ask your doctor if you can take an over-the-counter medicine. · If you think your pain medicine is making you sick to your stomach: 
¨ Take your medicine after meals (unless your doctor has told you not to). ¨ Ask your doctor for a different pain medicine. · If your doctor prescribed antibiotics, take them as directed. Do not stop taking them just because you feel better. You need to take the full course of antibiotics. Follow-up care is a key part of your treatment and safety. Be sure to make and go to all appointments, and call your doctor if you are having problems. It's also a good idea to know your test results and keep a list of the medicines you take. When should you call for help? Call 911 anytime you think you may need emergency care. For example, call if: 
· You passed out (lost consciousness). · You have severe trouble breathing. · You have sudden chest pain and shortness of breath, or you cough up blood. · You have severe belly pain. Call your doctor now or seek immediate medical care if: 
· You are sick to your stomach or cannot keep fluids down. · Your urine is still red or you see blood clots after you have urinated several times. · You have trouble passing urine or stool, especially if you have pain or swelling in your lower belly. · You have signs of a blood clot, such as: ¨ Pain in your calf, back of the knee, thigh, or groin. ¨ Redness and swelling in your leg or groin. · You develop a fever or severe chills. · You have pain in your back just below your rib cage. This is called flank pain. Watch closely for changes in your health, and be sure to contact your doctor if: 
· You have pain or burning when you urinate. A burning feeling is normal for a day or two after the test, but call if it does not get better. · You have a frequent urge to urinate but can pass only small amounts of urine. Your urine is pink, red, or cloudy, or smells bad. It is normal for the urine to have a pinkish color for a few days after the test, but call if it does not get better. After general anesthesia or intravenous sedation, for 24 hours or while taking prescription Narcotics: · Limit your activities · Do not drive and operate hazardous machinery · Do not make important personal or business decisions · Do  not drink alcoholic beverages · If you have not urinated within 8 hours after discharge, please contact your surgeon on call. *  Please give a list of your current medications to your Primary Care Provider. *  Please update this list whenever your medications are discontinued, doses are 
    changed, or new medications (including over-the-counter products) are added. *  Please carry medication information at all times in case of emergency situations. These are general instructions for a healthy lifestyle: No smoking/ No tobacco products/ Avoid exposure to second hand smoke Surgeon General's Warning:  Quitting smoking now greatly reduces serious risk to your health. Obesity, smoking, and sedentary lifestyle greatly increases your risk for illness A healthy diet, regular physical exercise & weight monitoring are important for maintaining a healthy lifestyle You may be retaining fluid if you have a history of heart failure or if you experience any of the following symptoms:  Weight gain of 3 pounds or more overnight or 5 pounds in a week, increased swelling in our hands or feet or shortness of breath while lying flat in bed. Please call your doctor as soon as you notice any of these symptoms; do not wait until your next office visit. Recognize signs and symptoms of STROKE: 
 
F-face looks uneven A-arms unable to move or move unevenly S-speech slurred or non-existent T-time-call 911 as soon as signs and symptoms begin-DO NOT go Back to bed or wait to see if you get better-TIME IS BRAIN. Discharge Orders None Introducing [facility]! Stephenie Hernandez introduces [employer] patient portal. Now you can access parts of your medical record, email your doctor's office, and request medication refills online. 1. In your internet browser, go to [email or web address] 2. Click on the First Time User? Click Here link in the Sign In box. You will see the New Member Sign Up page. 3. Enter your [employer] Access Code exactly as it appears below. You will not need to use this code after youve completed the sign-up process. If you do not sign up before the expiration date, you must request a new code. · [employer] Access Code: [email or web address] Expires: 8/15/2017  7:34 PM 
 
4. Enter the last four digits of your Social Security Number (xxxx) and Date of Birth (mm/dd/yyyy) as indicated and click Submit. You will be taken to the next sign-up page. 5. Create a [employer] ID. This will be your Yones login ID and cannot be changed, so think of one that is secure and easy to remember. 6. Create a [employer]ones password. You can change your password at any time. 7. Enter your Password Reset Question and Answer. This can be used at a later time if you forget your password. 8. Enter your e-mail address. You will receive e-mail notification when new information is available in [address] 9. Click Sign Up. You can now view and download portions of your medical record. 10. Click the Download Summary menu link to download a portable copy of your medical information. If you have questions, please visit the Frequently Asked Questions section of the [employer] website. Remember, [employer] is NOT to be used for urgent needs. For medical emergencies, dial 911. Now available from your iPhone and Android! General Information Please provide this summary of care documentation to your next provider. Patient Signature:  ____________________________________________________________ Date:  ____________________________________________________________  
  
[facility] Provider Signature:  ____________________________________________________________ Date:  ____________________________________________________________

## 2017-06-14 NOTE — DISCHARGE INSTRUCTIONS
Ureteral Stent Placement: What to Expect at [address] [place] [address]  A ureteral (say \"you-REE-ter-ul\") stent is a thin, hollow tube that is placed in the ureter to help urine pass from the kidney into the bladder. Ureters are the tubes that connect the kidneys to the bladder. You may have a small amount of blood in your urine for 1 to 3 days after the procedure. While the stent is in place, you may have to urinate more often, feel a sudden need to urinate, or feel like you cannot completely empty your bladder. You may feel some pain when you urinate or do strenuous activity. You also may notice a small amount of blood in your urine after strenuous activities. These side effects usually do not prevent people from doing their normal daily activities. Your urethra is the tube that carries urine from your bladder to outside your body. This string is attached to the stent. Try not to pull on the string. The doctor will use the string to pull out the stent when you no longer need it. After the procedure, urine may flow better from your kidneys to your bladder. A ureteral stent may be left in place for several days or for as long as several months. Your doctor will take it out when you no longer need it. This care sheet gives you a general idea about how long it will take for you to recover. But each person recovers at a different pace. Follow the steps below to get better as quickly as possible. Cystoscopy: What to Expect at [address] [place] [address]  A cystoscopy is a procedure that lets a doctor look inside of the bladder and the urethra. The urethra is the tube that carries urine from the bladder to outside the body. The doctor uses a thin, lighted tube called a cystoscope to look for kidney or bladder stones, tumors, bleeding, or infection. After the cystoscopy, your urethra may be sore at first, and it may burn when you urinate for the first few days after the procedure.  You may feel the need to urinate more often, and your urine may be pink. These symptoms should get better in 1 or 2 days. You will probably be able to go back to work or most of your usual activities in 1 or 2 days. This care sheet gives you a general idea about how long it will take for you to recover. But each person recovers at a different pace. Follow the steps below to get better as quickly as possible. How can you care for yourself at home? Activity  1. Rest when you feel tired. Getting enough sleep will help you recover. 2. Try to walk each day. Start by walking a little more than you did the day before. Bit by bit, increase the amount you walk. Walking boosts blood flow and helps prevent pneumonia and constipation. 3. Avoid strenuous activities, such as bicycle riding, jogging, weight lifting, or aerobic exercise, until your doctor says it is okay. 4. Ask your doctor when you can drive again. 5. Most people are able to return to work within 1 or 2 days after the procedure. 6. You may shower and take baths as usual.  7. Ask your doctor when it is okay for you to have sex. Diet  · You can eat your normal diet. If your stomach is upset, try bland, low-fat foods like plain rice, broiled chicken, toast, and yogurt. · Drink plenty of fluids (unless your doctor tells you not to). Medicines  · Take pain medicines exactly as directed. ¨ If the doctor gave you a prescription medicine for pain, take it as prescribed. ¨ If you are not taking a prescription pain medicine, ask your doctor if you can take an over-the-counter medicine. · If you think your pain medicine is making you sick to your stomach:  ¨ Take your medicine after meals (unless your doctor has told you not to). ¨ Ask your doctor for a different pain medicine. · If your doctor prescribed antibiotics, take them as directed. Do not stop taking them just because you feel better. You need to take the full course of antibiotics. Follow-up care is a key part of your treatment and safety.  Be sure to make and go to all appointments, and call your doctor if you are having problems. It's also a good idea to know your test results and keep a list of the medicines you take. When should you call for help? Call 911 anytime you think you may need emergency care. For example, call if:  · You passed out (lost consciousness). · You have severe trouble breathing. · You have sudden chest pain and shortness of breath, or you cough up blood. · You have severe belly pain. Call your doctor now or seek immediate medical care if:  · You are sick to your stomach or cannot keep fluids down. · Your urine is still red or you see blood clots after you have urinated several times. · You have trouble passing urine or stool, especially if you have pain or swelling in your lower belly. · You have signs of a blood clot, such as:  ¨ Pain in your calf, back of the knee, thigh, or groin. ¨ Redness and swelling in your leg or groin. · You develop a fever or severe chills. · You have pain in your back just below your rib cage. This is called flank pain. Watch closely for changes in your health, and be sure to contact your doctor if:  · You have pain or burning when you urinate. A burning feeling is normal for a day or two after the test, but call if it does not get better. · You have a frequent urge to urinate but can pass only small amounts of urine. Your urine is pink, red, or cloudy, or smells bad. It is normal for the urine to have a pinkish color for a few days after the test, but call if it does not get better. After general anesthesia or intravenous sedation, for 24 hours or while taking prescription Narcotics:  · Limit your activities  · Do not drive and operate hazardous machinery  · Do not make important personal or business decisions  · Do  not drink alcoholic beverages  · If you have not urinated within 8 hours after discharge, please contact your surgeon on call.     *  Please give a list of your current medications to your Primary Care Provider. *  Please update this list whenever your medications are discontinued, doses are      changed, or new medications (including over-the-counter products) are added. *  Please carry medication information at all times in case of emergency situations. These are general instructions for a healthy lifestyle:  No smoking/ No tobacco products/ Avoid exposure to second hand smoke  Surgeon General's Warning:  Quitting smoking now greatly reduces serious risk to your health. Obesity, smoking, and sedentary lifestyle greatly increases your risk for illness  A healthy diet, regular physical exercise & weight monitoring are important for maintaining a healthy lifestyle    You may be retaining fluid if you have a history of heart failure or if you experience any of the following symptoms:  Weight gain of 3 pounds or more overnight or 5 pounds in a week, increased swelling in our hands or feet or shortness of breath while lying flat in bed. Please call your doctor as soon as you notice any of these symptoms; do not wait until your next office visit. Recognize signs and symptoms of STROKE:    F-face looks uneven    A-arms unable to move or move unevenly    S-speech slurred or non-existent    T-time-call 911 as soon as signs and symptoms begin-DO NOT go       Back to bed or wait to see if you get better-TIME IS BRAIN.

## 2017-06-16 LAB
BACTERIA SPEC CULT: NORMAL
SERVICE CMNT-IMP: NORMAL

## 2017-07-10 ENCOUNTER — HOSPITAL ENCOUNTER (OUTPATIENT)
Dept: OCCUPATIONAL MEDICINE | Age: 32
Discharge: HOME | End: 2017-07-10

## 2017-07-10 DIAGNOSIS — T14.90XA INJURY, OTHER AND UNSPECIFIED, UNSPECIFIED SITE: ICD-10-CM

## 2017-07-25 ENCOUNTER — HOSPITAL ENCOUNTER (OUTPATIENT)
Dept: PHYSICAL THERAPY | Age: 32
Discharge: HOME | End: 2017-07-25
Payer: COMMERCIAL

## 2017-07-25 PROCEDURE — 97161 PT EVAL LOW COMPLEX 20 MIN: CPT

## 2017-07-25 PROCEDURE — 97110 THERAPEUTIC EXERCISES: CPT

## 2017-07-25 NOTE — PROGRESS NOTES
Ambulatory/Rehab Services H2 Model Falls Risk Assessment    Risk Factor Pts. ·   Confusion/Disorientation/Impulsivity  []    4 ·   Symptomatic Depression  []   2 ·   Altered Elimination  []   1 ·   Dizziness/Vertigo  []   1 ·   Gender (Male)  [x]   1 ·   Any administered antiepileptics (anticonvulsants):  []   2 ·   Any administered benzodiazepines:  []   1 ·   Visual Impairment (specify):  []   1 ·   Portable Oxygen Use  []   1 ·   Orthostatic ? BP  []   1 ·   History of Recent Falls (within 3 mos.)  []   5     Ability to Rise from Chair (choose one) Pts. ·   Ability to rise in a single movement  [x]   0 ·   Pushes up, successful in one attempt  []   1 ·   Multiple attempts, but successful  []   3 ·   Unable to rise without assistance  []   4   Total: (5 or greater = High Risk) 1     Falls Prevention Plan:   []                Physical Limitations to Exercise (specify):   []                Mobility Assistance Device (type):   []                Exercise/Equipment Adaptation (specify):    ©2010 [facility] Patent #[id number].  Federal Law prohibits the replication, distribution or use without written permission from [facility]

## 2017-07-25 NOTE — PROGRESS NOTES
Peter Askew  : 1985 [facility] at [facility]  [address], [address], [place]  [phone]   [phone]       OUTPATIENT PHYSICAL THERAPY:Initial Assessment 2017    ICD-10: Treatment Diagnosis:  Pain in left knee (M25.562), Sprain of unspecified site of left knee, subsequent encounter (S83.92XD), Difficulty in walking, not elsewhere classified (R26.2)  Precautions/Allergies:   Phenergan [promethazine]   Fall Risk Score: 1 (? 5 = High Risk)  MD Orders: PT Eval and treat MEDICAL/REFERRING DIAGNOSIS:  pain in left knee   DATE OF ONSET: early 2017  REFERRING PHYSICIAN: Evelio Mueller MD  RETURN PHYSICIAN APPOINTMENT: 8/15/17     ASSESSMENT:  Mr. Paola Wisdom presents with L knee pain and difficulty walking after having a fall at work where he landed on his knee. Pt had an MRI which showed a chronic avulsion fracture of the tibia at the site of the ACL which is now scarred down, as well as some degeneration of the lateral meniscus. During physical exam pt had very mild laxity to the ACL, other ligaments intact though painful when stressed, and pt has a hyperextended L knee. Overall the knee is relatively stable. He demonstrates decreased range of motion, decreased strength of the L LE, and impaired balance. Pt will benefit from skilled PT to address impairments and return to PLOF. PROBLEM LIST (Impacting functional limitations):  1. Decreased Strength  2. Decreased ADL/Functional Activities  3. Decreased Ambulation Ability/Technique  4. Decreased Balance  5. Increased Pain  6. Decreased Activity Tolerance  7. Decreased Flexibility/Joint Mobility INTERVENTIONS PLANNED:  1. Balance Exercise  2. Cold  3. Cryotherapy  4. Electrical Stimulation  5. Heat  6. Home Exercise Program (HEP)  7. Manual Therapy  8. Neuromuscular Re-education/Strengthening  9. Range of Motion (ROM)  10.  Therapeutic Exercise/Strengthening   TREATMENT PLAN:  Effective Dates: 17 TO 9/27/17. Frequency/Duration: 3 times a week for 6 weeks  GOALS: (Goals have been discussed and agreed upon with patient.)  Short-Term Functional Goals: Time Frame: 3 weeks  1. Pt will be I and compliant with initial HEP  2. Pt will report 50% decrease in overall symptoms  Discharge Goals: Time Frame: 6 weeks  1. Pt will demo [place] [place] L knee flexion for improved function and ability to bend/squat  2. Pt will demo WFL B LE strength for normal function  3. Pt will report no difficulty navigating stairs  4. Pt will be able to walk any distance as needed with no restriction or modification  5. Pt will score at least 70/80 on LEFS  Rehabilitation Potential For Stated Goals: Good  Regarding Ravinder Heredia's therapy, I certify that the treatment plan above will be carried out by a therapist or under their direction. Thank you for this referral,  Boris Lopes PT     Referring Physician Signature: Jennifer Wise MD              Date                    The information in this section was collected on 7/25/17 (except where otherwise noted). HISTORY:   History of Present Injury/Illness (Reason for Referral): Isiah Soni presents with L knee pain after he slipped and fell at work, landing on this knee. This was approximately 2 weeks ago. Happened quickly and does not remember specifically how he landed. Work took him straight to [employer], was given a knee brace. Upon follow-up, MRI was ordered and referred to PT. MRI report shows \"attenuation to the body and posterior horn of the medial meniscus\", \"flattening and degeneration\" throughout lateral meniscus, and also \"chronic appearing avulsion of the anterior tibial spine which is attached to the ACL which is degenerated and scarred. \"    Pain location: L knee, medial and posterior aspect, described as deep  Pain levels - Current: 3/10   At worst: 7/10  Description: Sharp   Increases pain: Walking long periods, stairs, getting in/out of car   Decreases pain:  Ice occasionally, resting with knee propped up on pillow    Past Medical History/Comorbidities:   Mr. Sandra Soto  has a past medical history of Arrhythmia; Diabetes ([address] Morbid obesity ([address] Nausea & vomiting; and Renal stone. He also has no past medical history of Aneurysm ([address] Arthritis; Asthma; Autoimmune disease ([address] CAD (coronary artery disease); Chronic kidney disease; Chronic obstructive pulmonary disease ([address] Chronic pain; Coagulation disorder ([address] Difficult intubation; GERD (gastroesophageal reflux disease); Heart failure ([address] Hypertension; Liver disease; Malignant hyperthermia due to anesthesia; Pseudocholinesterase deficiency; Psychiatric disorder; PUD (peptic ulcer disease); Seizures ([facility] [address] [facility] Stroke [facility]); Thromboembolus ([facility] [address] Thyroid disease; Unspecified adverse effect of anesthesia; or Unspecified sleep apnea. Mr. Sandra Soto  has a past surgical history that includes appendectomy (1996); tonsillectomy (age 10); adenoidectomy (age13); orthopaedic; and orthopaedic (2015).     Social History/Living Environment:  Lives with family, 2 kids ages 1 and 2 months and has a dog    Prior Level of Function/Work/Activity: Works as a , does not exercise, enjoys spending time with family    Functional Limitations: Looking to go back to work with restrictions    Current Medications:       Current Outpatient Prescriptions:     metFORMIN ER 1,000 mg tr24, Take  by mouth two (2) times a day., Disp: , Rfl:    Date Last Reviewed:  7/25/2017     Number of Personal Factors/Comorbidities that affect the Plan of Care: 0: LOW COMPLEXITY   EXAMINATION:     Observation/Postural Assessment: Genu recurvatum on the L, visible atrophy of L quads and calf muscles    Palpation: No palpation to L knee grossly    ROM:   Knee AROM R L   Flexion 135 114   Extension 0 (8)       Strength:   LE MMT R L   Hip Flexion 5 5   Hip ER 5 5   Hip IR 5 5   Hip Abduction 4+ 4   Hip Extension 4+ 4   Knee Flexion 5 4   Knee Extension 5 5   Ankle DF 5 5   Ankle PF 3 2+      Heel raises: R: performs 7-8 before losing form  L: Cannot perform full range single leg heel raise    Functional Measures:    Gait: Mostly normal, makes effort not to come down hard on L foot    Special Tests:    Valgus: R: (-)  L: (-) for laxity, painful   Varus:  R: (-)  L: (-) for laxity, painful   Lachman's: R: (-)  L: very mild, almost insignificant laxity   Posterior drawer: (-) B      Balance:  SLS:  R: 30s  L: 9s     Body Structures Involved:  1. Joints  2. Muscles  3. Ligaments Body Functions Affected:  1. Neuromusculoskeletal  2. Movement Related Activities and Participation Affected:  1. General Tasks and Demands  2. Mobility  3. Domestic Life  4. Interpersonal Interactions and Relationships  5. Community, Social and [place]   Number of elements that affect the Plan of Care: 4+: HIGH COMPLEXITY   CLINICAL PRESENTATION:   Presentation: Stable and uncomplicated: LOW COMPLEXITY   CLINICAL DECISION MAKING:   Outcome Measure: Tool Used: Lower Extremity Functional Scale (LEFS)  Score:  Initial: 56/80 Most Recent: X/80 (Date: -- )   Interpretation of Score: 20 questions each scored on a 5 point scale with 0 representing \"extreme difficulty or unable to perform\" and 4 representing \"no difficulty\". The lower the score, the greater the functional disability. 80/80 represents no disability. Minimal detectable change is 9 points. Medical Necessity:   · Patient demonstrates good rehab potential due to pain, difficulty walking, difficulty with stairs, decreased range of motion and strength, and higher previous functional level. Reason for Services/Other Comments:  · Patient continues to require skilled intervention due to decreased functional status.      Use of outcome tool(s) and clinical judgement create a POC that gives a: Clear prediction of patient's progress: LOW COMPLEXITY            TREATMENT:   (In addition to Assessment/Re-Assessment sessions the following treatments were rendered)      Present Symptoms/Complaints - 7/25/2017:  See hx  Pain: Initial:   Pain Intensity 1: 3  Post Session:  unchanged       Therapeutic Exercise: (15 minutes):  Exercises per grid below to improve mobility, strength and balance. Required moderate visual, verbal and tactile cues to promote proper body alignment, promote proper body posture and promote proper body mechanics. Progressed resistance, range and repetitions as indicated. Date:  7/25/17 Date:   Date:     Activity/Exercise Parameters Parameters Parameters   Pt education Findings, anatomy/pathology, POC, HEP     Hamstring stretch Long holds B     Calf stretch At wall long holds B                                 Manual Therapy: (0 minutes): was utilized and necessary because of the patient's restricted joint motion and restricted motion of soft tissue. Treatment/Session Assessment:  Pt understood educational interventions and agreed with plan. · Compliance with Program/Exercises: Will assess as treatment progresses. · Recommendations/Intent for next treatment session: Next visit will focus on advancements to more challenging activities.     Total Treatment Duration:  PT Patient Time In/Time Out  Time In: 1400  Time Out: Heath Johnston PT

## 2017-07-27 ENCOUNTER — HOSPITAL ENCOUNTER (OUTPATIENT)
Dept: PHYSICAL THERAPY | Age: 32
Discharge: HOME | End: 2017-07-27
Payer: COMMERCIAL

## 2017-07-27 PROCEDURE — 97110 THERAPEUTIC EXERCISES: CPT

## 2017-07-27 NOTE — PROGRESS NOTES
Paulette Marvin  : 1985 [facility] at [facility]  [address], [address], [place] [address]  [phone]   [phone]       OUTPATIENT PHYSICAL THERAPY:Daily Note 2017    ICD-10: Treatment Diagnosis:  Pain in left knee (M25.562), Sprain of unspecified site of left knee, subsequent encounter (S83.92XD), Difficulty in walking, not elsewhere classified (R26.2)  Precautions/Allergies:   Phenergan [promethazine]   Fall Risk Score: 1 (? 5 = High Risk)  MD Orders: PT Eval and treat MEDICAL/REFERRING DIAGNOSIS:  pain in left knee   DATE OF ONSET: early 2017  REFERRING PHYSICIAN: Ash Garcia MD  RETURN PHYSICIAN APPOINTMENT: 8/15/17     ASSESSMENT:  Mr. Ghislaine Chew presents with L knee pain and difficulty walking after having a fall at work where he landed on his knee. Pt had an MRI which showed a chronic avulsion fracture of the tibia at the site of the ACL which is now scarred down, as well as some degeneration of the lateral meniscus. During physical exam pt had very mild laxity to the ACL, other ligaments intact though painful when stressed, and pt has a hyperextended L knee. Overall the knee is relatively stable. He demonstrates decreased range of motion, decreased strength of the L LE, and impaired balance. Pt will benefit from skilled PT to address impairments and return to PLOF. PROBLEM LIST (Impacting functional limitations):  1. Decreased Strength  2. Decreased ADL/Functional Activities  3. Decreased Ambulation Ability/Technique  4. Decreased Balance  5. Increased Pain  6. Decreased Activity Tolerance  7. Decreased Flexibility/Joint Mobility INTERVENTIONS PLANNED:  1. Balance Exercise  2. Cold  3. Cryotherapy  4. Electrical Stimulation  5. Heat  6. Home Exercise Program (HEP)  7. Manual Therapy  8. Neuromuscular Re-education/Strengthening  9. Range of Motion (ROM)  10.  Therapeutic Exercise/Strengthening   TREATMENT PLAN:  Effective Dates: 17 TO 9/27/17. Frequency/Duration: 3 times a week for 6 weeks  GOALS: (Goals have been discussed and agreed upon with patient.)  Short-Term Functional Goals: Time Frame: 3 weeks  1. Pt will be I and compliant with initial HEP  2. Pt will report 50% decrease in overall symptoms  Discharge Goals: Time Frame: 6 weeks  1. Pt will demo [place] [place] L knee flexion for improved function and ability to bend/squat  2. Pt will demo WFL B LE strength for normal function  3. Pt will report no difficulty navigating stairs  4. Pt will be able to walk any distance as needed with no restriction or modification  5. Pt will score at least 70/80 on LEFS  Rehabilitation Potential For Stated Goals: Good  Regarding Ravinder Heredia's therapy, I certify that the treatment plan above will be carried out by a therapist or under their direction. Thank you for this referral,  Ashtyn Lloyd, PT                The information in this section was collected on 7/25/17 (except where otherwise noted). HISTORY:   History of Present Injury/Illness (Reason for Referral): Robert Franks presents with L knee pain after he slipped and fell at work, landing on this knee. This was approximately 2 weeks ago. Happened quickly and does not remember specifically how he landed. Work took him straight to [employer], was given a knee brace. Upon follow-up, MRI was ordered and referred to PT. MRI report shows \"attenuation to the body and posterior horn of the medial meniscus\", \"flattening and degeneration\" throughout lateral meniscus, and also \"chronic appearing avulsion of the anterior tibial spine which is attached to the ACL which is degenerated and scarred. \"    Pain location: L knee, medial and posterior aspect, described as deep  Pain levels - Current: 3/10   At worst: 7/10  Description: Sharp   Increases pain: Walking long periods, stairs, getting in/out of car   Decreases pain: Ice occasionally, resting with knee propped up on pillow    Past Medical History/Comorbidities:   Mr. Odilia Quintanilla  has a past medical history of Arrhythmia; Diabetes ([facility] [address] Morbid obesity ([address] Nausea & vomiting; and Renal stone. He also has no past medical history of Aneurysm ([address] Arthritis; Asthma; Autoimmune disease ([address] CAD (coronary artery disease); Chronic kidney disease; Chronic obstructive pulmonary disease ([address] Chronic pain; Coagulation disorder ([address] Difficult intubation; GERD (gastroesophageal reflux disease); Heart failure ([address] Hypertension; Liver disease; Malignant hyperthermia due to anesthesia; Pseudocholinesterase deficiency; Psychiatric disorder; PUD (peptic ulcer disease); Seizures ([facility] [address] Stroke [facility]); Thromboembolus ([facility] [address] [facility] Thyroid disease; Unspecified adverse effect of anesthesia; or Unspecified sleep apnea. Mr. Odilia Quintanilla  has a past surgical history that includes appendectomy (1996); tonsillectomy (age 10); adenoidectomy (age15); orthopaedic; and orthopaedic (2015).     Social History/Living Environment:  Lives with family, 2 kids ages 1 and 2 months and has a dog    Prior Level of Function/Work/Activity: Works as a , does not exercise, enjoys spending time with family    Functional Limitations: Looking to go back to work with restrictions    Current Medications:       Current Outpatient Prescriptions:     metFORMIN ER 1,000 mg tr24, Take  by mouth two (2) times a day., Disp: , Rfl:    Date Last Reviewed:  7/27/2017     EXAMINATION:     Observation/Postural Assessment: Genu recurvatum on the L, visible atrophy of L quads and calf muscles    Palpation: No palpation to L knee grossly    ROM:   Knee AROM R L   Flexion 135 114   Extension 0 (8)       Strength:   LE MMT R L   Hip Flexion 5 5   Hip ER 5 5   Hip IR 5 5   Hip Abduction 4+ 4   Hip Extension 4+ 4   Knee Flexion 5 4   Knee Extension 5 5   Ankle DF 5 5   Ankle PF 3 2+      Heel raises: R: performs 7-8 before losing form  L: Cannot perform full range single leg heel raise    Functional Measures:    Gait: Mostly normal, makes effort not to come down hard on L foot    Special Tests:    Valgus: R: (-)  L: (-) for laxity, painful   Varus:  R: (-)  L: (-) for laxity, painful   Lachman's: R: (-)  L: very mild, almost insignificant laxity   Posterior drawer: (-) B      Balance:  SLS:  R: 30s  L: 9s     CLINICAL DECISION MAKING:   Outcome Measure: Tool Used: Lower Extremity Functional Scale (LEFS)  Score:  Initial: 56/80 Most Recent: X/80 (Date: -- )   Interpretation of Score: 20 questions each scored on a 5 point scale with 0 representing \"extreme difficulty or unable to perform\" and 4 representing \"no difficulty\". The lower the score, the greater the functional disability. 80/80 represents no disability. Minimal detectable change is 9 points. Medical Necessity:   · Patient demonstrates good rehab potential due to pain, difficulty walking, difficulty with stairs, decreased range of motion and strength, and higher previous functional level. Reason for Services/Other Comments:  · Patient continues to require skilled intervention due to decreased functional status. TREATMENT:   (In addition to Assessment/Re-Assessment sessions the following treatments were rendered)      Present Symptoms/Complaints - 7/27/2017: Pt reports he feels fine after initial eval.  Pain: Initial:   Pain Intensity 1: 1  Post Session:  unchanged       Therapeutic Exercise: (40 minutes):  Exercises per grid below to improve mobility, strength and balance. Required moderate visual, verbal and tactile cues to promote proper body alignment, promote proper body posture and promote proper body mechanics. Progressed resistance, range and repetitions as indicated.      Date:  7/25/17 Date:  7/27/17 Date:     Activity/Exercise Parameters Parameters Parameters   Pt education Findings, anatomy/pathology, POC, HEP     Hamstring stretch Long holds B Long holds B    Calf stretch At wall long holds B Incline board long holds B    Heel slides SLR  3 x 10 L    LAQ  5# 40x    Heel raises  30x    Hamstring curls  5# 30x    Static balance exercises  Tandem stance on airex, L behind, to fatigue    Square fitter  DLS s/s 87K        Manual Therapy: (0 minutes): was utilized and necessary because of the patient's restricted joint motion and restricted motion of soft tissue. - Ice pack to L knee 10 min post-tx    Treatment/Session Assessment:  Pt had some fatigue and difficulty with square fitter and SLR. Mild discomfort started to occur at end of set of heel raises. Instructed to perform SLRs over the weekend. · Compliance with Program/Exercises: Will assess as treatment progresses. · Recommendations/Intent for next treatment session: Next visit will focus on advancements to more challenging activities.   · Variance from plan of care: None    Total Treatment Duration:  PT Patient Time In/Time Out  Time In: 0945  Time Out: Chvio Arteaga, PT

## 2017-07-31 ENCOUNTER — HOSPITAL ENCOUNTER (OUTPATIENT)
Dept: PHYSICAL THERAPY | Age: 32
Discharge: HOME | End: 2017-07-31
Payer: COMMERCIAL

## 2017-07-31 PROCEDURE — 97110 THERAPEUTIC EXERCISES: CPT

## 2017-07-31 NOTE — PROGRESS NOTES
Nicolette Barber  : 1985 [facility]  [address], [address], [place]  [phone]   [phone]       OUTPATIENT PHYSICAL THERAPY:Daily Note 2017    ICD-10: Treatment Diagnosis:  Pain in left knee (M25.562), Sprain of unspecified site of left knee, subsequent encounter (S83.92XD), Difficulty in walking, not elsewhere classified (R26.2)  Precautions/Allergies:   Phenergan [promethazine]   Fall Risk Score: 1 (? 5 = High Risk)  MD Orders: PT Eval and treat MEDICAL/REFERRING DIAGNOSIS:  pain in left knee   DATE OF ONSET: early 2017  REFERRING PHYSICIAN: Armin Cleary MD  RETURN PHYSICIAN APPOINTMENT: 8/15/17     ASSESSMENT:  Mr. Mikaela Gomez presents with L knee pain and difficulty walking after having a fall at work where he landed on his knee. Pt had an MRI which showed a chronic avulsion fracture of the tibia at the site of the ACL which is now scarred down, as well as some degeneration of the lateral meniscus. During physical exam pt had very mild laxity to the ACL, other ligaments intact though painful when stressed, and pt has a hyperextended L knee. Overall the knee is relatively stable. He demonstrates decreased range of motion, decreased strength of the L LE, and impaired balance. Pt will benefit from skilled PT to address impairments and return to PLOF. PROBLEM LIST (Impacting functional limitations):  1. Decreased Strength  2. Decreased ADL/Functional Activities  3. Decreased Ambulation Ability/Technique  4. Decreased Balance  5. Increased Pain  6. Decreased Activity Tolerance  7. Decreased Flexibility/Joint Mobility INTERVENTIONS PLANNED:  1. Balance Exercise  2. Cold  3. Cryotherapy  4. Electrical Stimulation  5. Heat  6. Home Exercise Program (HEP)  7. Manual Therapy  8. Neuromuscular Re-education/Strengthening  9. Range of Motion (ROM)  10.  Therapeutic Exercise/Strengthening   TREATMENT PLAN:  Effective Dates: 17 TO 9/27/17. Frequency/Duration: 3 times a week for 6 weeks  GOALS: (Goals have been discussed and agreed upon with patient.)  Short-Term Functional Goals: Time Frame: 3 weeks  1. Pt will be I and compliant with initial HEP  2. Pt will report 50% decrease in overall symptoms  Discharge Goals: Time Frame: 6 weeks  1. Pt will demo [place] [place] L knee flexion for improved function and ability to bend/squat  2. Pt will demo WFL B LE strength for normal function  3. Pt will report no difficulty navigating stairs  4. Pt will be able to walk any distance as needed with no restriction or modification  5. Pt will score at least 70/80 on LEFS  Rehabilitation Potential For Stated Goals: Good  Regarding Ravinder Heredia's therapy, I certify that the treatment plan above will be carried out by a therapist or under their direction. Thank you for this referral,  Faiza Saldana, PT                The information in this section was collected on 7/25/17 (except where otherwise noted). HISTORY:   History of Present Injury/Illness (Reason for Referral): Coy Weeks presents with L knee pain after he slipped and fell at work, landing on this knee. This was approximately 2 weeks ago. Happened quickly and does not remember specifically how he landed. Work took him straight to [employer], was given a knee brace. Upon follow-up, MRI was ordered and referred to PT. MRI report shows \"attenuation to the body and posterior horn of the medial meniscus\", \"flattening and degeneration\" throughout lateral meniscus, and also \"chronic appearing avulsion of the anterior tibial spine which is attached to the ACL which is degenerated and scarred. \"    Pain location: L knee, medial and posterior aspect, described as deep  Pain levels - Current: 3/10   At worst: 7/10  Description: Sharp   Increases pain: Walking long periods, stairs, getting in/out of car   Decreases pain: Ice occasionally, resting with knee propped up on pillow    Past Medical History/Comorbidities:   Mr. Dung Duarte  has a past medical history of Arrhythmia; Diabetes ([facility] [address] Morbid obesity ([address] Nausea & vomiting; and Renal stone. He also has no past medical history of Aneurysm ([address] Arthritis; Asthma; Autoimmune disease ([address] CAD (coronary artery disease); Chronic kidney disease; Chronic obstructive pulmonary disease ([address] Chronic pain; Coagulation disorder ([address] Difficult intubation; GERD (gastroesophageal reflux disease); Heart failure ([address] Hypertension; Liver disease; Malignant hyperthermia due to anesthesia; Pseudocholinesterase deficiency; Psychiatric disorder; PUD (peptic ulcer disease); Seizures ([facility] [address] Stroke [facility]); Thromboembolus ([facility] [address] Thyroid disease; Unspecified adverse effect of anesthesia; or Unspecified sleep apnea. Mr. Dung Duarte  has a past surgical history that includes appendectomy (1996); tonsillectomy (age 10); adenoidectomy (age15); orthopaedic; and orthopaedic (2015).     Social History/Living Environment:  Lives with family, 2 kids ages 1 and 2 months and has a dog    Prior Level of Function/Work/Activity: Works as a , does not exercise, enjoys spending time with family    Functional Limitations: Looking to go back to work with restrictions    Current Medications:       Current Outpatient Prescriptions:     metFORMIN ER 1,000 mg tr24, Take  by mouth two (2) times a day., Disp: , Rfl:    Date Last Reviewed:  7/31/2017     EXAMINATION:     Observation/Postural Assessment: Genu recurvatum on the L, visible atrophy of L quads and calf muscles    Palpation: No palpation to L knee grossly    ROM:   Knee AROM R L   Flexion 135 114   Extension 0 (8)       Strength:   LE MMT R L   Hip Flexion 5 5   Hip ER 5 5   Hip IR 5 5   Hip Abduction 4+ 4   Hip Extension 4+ 4   Knee Flexion 5 4   Knee Extension 5 5   Ankle DF 5 5   Ankle PF 3 2+      Heel raises: R: performs 7-8 before losing form  L: Cannot perform full range single leg heel raise    Functional Measures:    Gait: Mostly normal, makes effort not to come down hard on L foot    Special Tests:    Valgus: R: (-)  L: (-) for laxity, painful   Varus:  R: (-)  L: (-) for laxity, painful   Lachman's: R: (-)  L: very mild, almost insignificant laxity   Posterior drawer: (-) B      Balance:  SLS:  R: 30s  L: 9s     CLINICAL DECISION MAKING:   Outcome Measure: Tool Used: Lower Extremity Functional Scale (LEFS)  Score:  Initial: 56/80 Most Recent: X/80 (Date: -- )   Interpretation of Score: 20 questions each scored on a 5 point scale with 0 representing \"extreme difficulty or unable to perform\" and 4 representing \"no difficulty\". The lower the score, the greater the functional disability. 80/80 represents no disability. Minimal detectable change is 9 points. Medical Necessity:   · Patient demonstrates good rehab potential due to pain, difficulty walking, difficulty with stairs, decreased range of motion and strength, and higher previous functional level. Reason for Services/Other Comments:  · Patient continues to require skilled intervention due to decreased functional status. TREATMENT:   (In addition to Assessment/Re-Assessment sessions the following treatments were rendered)      Present Symptoms/Complaints - 7/31/2017: Pt reports he had a little bit of knee pain over the weekend due to walking a lot at the mall and at various activities. Pain: Initial:   Pain Intensity 1: 0  Post Session:  unchanged       Therapeutic Exercise: (45 minutes):  Exercises per grid below to improve mobility, strength and balance. Required moderate visual, verbal and tactile cues to promote proper body alignment, promote proper body posture and promote proper body mechanics. Progressed resistance, range and repetitions as indicated.      Date:  7/25/17 Date:  7/27/17 Date:  7/31/17   Activity/Exercise Parameters Parameters Parameters   Pt education Findings, anatomy/pathology, POC, HEP     Recumbent bike  10 min L3 10 min L3 Hamstring stretch Long holds B Long holds B Long holds B   Calf stretch At wall long holds B Incline board long holds B Incline board long holds B   Heel slides      SLR  3 x 10 L 3 x 10 L   LAQ  5# 40x 5# 40x   Heel raises  30x 30x on incline board   Hamstring curls  5# 30x 5# 30x   Static balance exercises  Tandem stance on airex, L behind, to fatigue Standing on bosu practice no UEs   Square fitter  DLS s/s 82O DLS: s/s and fwd/back 30x ea       Treatment/Session Assessment:  Pt had some fatigue with SLR. No pain or adverse effects with treatment. · Compliance with Program/Exercises: Will assess as treatment progresses. · Recommendations/Intent for next treatment session: Next visit will focus on advancements to more challenging activities.   · Variance from plan of care: None    Total Treatment Duration:  PT Patient Time In/Time Out  Time In: 1600  Time Out: 120 [facility], PT

## 2017-08-01 ENCOUNTER — HOSPITAL ENCOUNTER (OUTPATIENT)
Dept: PHYSICAL THERAPY | Age: 32
Discharge: HOME | End: 2017-08-01
Payer: COMMERCIAL

## 2017-08-01 PROCEDURE — 97016 VASOPNEUMATIC DEVICE THERAPY: CPT

## 2017-08-01 PROCEDURE — 97110 THERAPEUTIC EXERCISES: CPT

## 2017-08-01 NOTE — PROGRESS NOTES
Trevon Mclaughlin  : 1985 [facility] [address]  [address], [address], [place] [address]  [phone]   [phone]       OUTPATIENT PHYSICAL THERAPY:Daily Note 2017    ICD-10: Treatment Diagnosis:  Pain in left knee (M25.562), Sprain of unspecified site of left knee, subsequent encounter (S83.92XD), Difficulty in walking, not elsewhere classified (R26.2)  Precautions/Allergies:   Phenergan [promethazine]   Fall Risk Score: 1 (? 5 = High Risk)  MD Orders: PT Eval and treat MEDICAL/REFERRING DIAGNOSIS:  pain in left knee   DATE OF ONSET: early 2017  REFERRING PHYSICIAN: Keiry Barrera MD  RETURN PHYSICIAN APPOINTMENT: 8/15/17     ASSESSMENT:  Mr. Levin Boxer presents with L knee pain and difficulty walking after having a fall at work where he landed on his knee. Pt had an MRI which showed a chronic avulsion fracture of the tibia at the site of the ACL which is now scarred down, as well as some degeneration of the lateral meniscus. During physical exam pt had very mild laxity to the ACL, other ligaments intact though painful when stressed, and pt has a hyperextended L knee. Overall the knee is relatively stable. He demonstrates decreased range of motion, decreased strength of the L LE, and impaired balance. Pt will benefit from skilled PT to address impairments and return to PLOF. PROBLEM LIST (Impacting functional limitations):  1. Decreased Strength  2. Decreased ADL/Functional Activities  3. Decreased Ambulation Ability/Technique  4. Decreased Balance  5. Increased Pain  6. Decreased Activity Tolerance  7. Decreased Flexibility/Joint Mobility INTERVENTIONS PLANNED:  1. Balance Exercise  2. Cold  3. Cryotherapy  4. Electrical Stimulation  5. Heat  6. Home Exercise Program (HEP)  7. Manual Therapy  8. Neuromuscular Re-education/Strengthening  9. Range of Motion (ROM)  10.  Therapeutic Exercise/Strengthening   TREATMENT PLAN:  Effective Dates: 17 TO 9/27/17. Frequency/Duration: 3 times a week for 6 weeks  GOALS: (Goals have been discussed and agreed upon with patient.)  Short-Term Functional Goals: Time Frame: 3 weeks  1. Pt will be I and compliant with initial HEP  2. Pt will report 50% decrease in overall symptoms  Discharge Goals: Time Frame: 6 weeks  1. Pt will demo [place] [place] L knee flexion for improved function and ability to bend/squat  2. Pt will demo WFL B LE strength for normal function  3. Pt will report no difficulty navigating stairs  4. Pt will be able to walk any distance as needed with no restriction or modification  5. Pt will score at least 70/80 on LEFS  Rehabilitation Potential For Stated Goals: Good  Regarding Ravinder Heredia's therapy, I certify that the treatment plan above will be carried out by a therapist or under their direction. Thank you for this referral,  Amber Comment, PT                The information in this section was collected on 7/25/17 (except where otherwise noted). HISTORY:   History of Present Injury/Illness (Reason for Referral): Mio Swain presents with L knee pain after he slipped and fell at work, landing on this knee. This was approximately 2 weeks ago. Happened quickly and does not remember specifically how he landed. Work took him straight to [employer], was given a knee brace. Upon follow-up, MRI was ordered and referred to PT. MRI report shows \"attenuation to the body and posterior horn of the medial meniscus\", \"flattening and degeneration\" throughout lateral meniscus, and also \"chronic appearing avulsion of the anterior tibial spine which is attached to the ACL which is degenerated and scarred. \"    Pain location: L knee, medial and posterior aspect, described as deep  Pain levels - Current: 3/10   At worst: 7/10  Description: Sharp   Increases pain: Walking long periods, stairs, getting in/out of car   Decreases pain: Ice occasionally, resting with knee propped up on pillow    Past Medical History/Comorbidities:   Mr. Cosme Reid  has a past medical history of Arrhythmia; Diabetes ([address] Morbid obesity ([address] Nausea & vomiting; and Renal stone. He also has no past medical history of Aneurysm ([address] Arthritis; Asthma; Autoimmune disease ([address] CAD (coronary artery disease); Chronic kidney disease; Chronic obstructive pulmonary disease ([address] Chronic pain; Coagulation disorder ([address] Difficult intubation; GERD (gastroesophageal reflux disease); Heart failure ([address] Hypertension; Liver disease; Malignant hyperthermia due to anesthesia; Pseudocholinesterase deficiency; Psychiatric disorder; PUD (peptic ulcer disease); Seizures ([facility] [address] Stroke [facility]); Thromboembolus ([facility] [address] Thyroid disease; Unspecified adverse effect of anesthesia; or Unspecified sleep apnea. Mr. Cosme Reid  has a past surgical history that includes appendectomy (1996); tonsillectomy (age 10); adenoidectomy (age13); orthopaedic; and orthopaedic (2015).     Social History/Living Environment:  Lives with family, 2 kids ages 1 and 2 months and has a dog    Prior Level of Function/Work/Activity: Works as a , does not exercise, enjoys spending time with family    Functional Limitations: Looking to go back to work with restrictions    Current Medications:       Current Outpatient Prescriptions:     metFORMIN ER 1,000 mg tr24, Take  by mouth two (2) times a day., Disp: , Rfl:    Date Last Reviewed:  8/1/2017     EXAMINATION:     Observation/Postural Assessment: Genu recurvatum on the L, visible atrophy of L quads and calf muscles    Palpation: No palpation to L knee grossly    ROM:   Knee AROM R L   Flexion 135 114   Extension 0 (8)       Strength:   LE MMT R L   Hip Flexion 5 5   Hip ER 5 5   Hip IR 5 5   Hip Abduction 4+ 4   Hip Extension 4+ 4   Knee Flexion 5 4   Knee Extension 5 5   Ankle DF 5 5   Ankle PF 3 2+      Heel raises: R: performs 7-8 before losing form  L: Cannot perform full range single leg heel raise    Functional Measures:    Gait: Mostly normal, makes effort not to come down hard on L foot    Special Tests:    Valgus: R: (-)  L: (-) for laxity, painful   Varus:  R: (-)  L: (-) for laxity, painful   Lachman's: R: (-)  L: very mild, almost insignificant laxity   Posterior drawer: (-) B      Balance:  SLS:  R: 30s  L: 9s     CLINICAL DECISION MAKING:   Outcome Measure: Tool Used: Lower Extremity Functional Scale (LEFS)  Score:  Initial: 56/80 Most Recent: X/80 (Date: -- )   Interpretation of Score: 20 questions each scored on a 5 point scale with 0 representing \"extreme difficulty or unable to perform\" and 4 representing \"no difficulty\". The lower the score, the greater the functional disability. 80/80 represents no disability. Minimal detectable change is 9 points. Medical Necessity:   · Patient demonstrates good rehab potential due to pain, difficulty walking, difficulty with stairs, decreased range of motion and strength, and higher previous functional level. Reason for Services/Other Comments:  · Patient continues to require skilled intervention due to decreased functional status. TREATMENT:   (In addition to Assessment/Re-Assessment sessions the following treatments were rendered)      Present Symptoms/Complaints - 8/1/2017: Pt reports he was sore after yesterday's session. Pain: Initial:   Pain Intensity 1: 2  Post Session:  unchanged       Therapeutic Exercise: (30 minutes):  Exercises per grid below to improve mobility, strength and balance. Required moderate visual, verbal and tactile cues to promote proper body alignment, promote proper body posture and promote proper body mechanics. Progressed resistance, range and repetitions as indicated.      Date:  7/25/17 Date:  7/27/17 Date:  7/31/17 Date:  8/1/17   Activity/Exercise Parameters Parameters Parameters    Pt education Findings, anatomy/pathology, POC, HEP      Recumbent bike  10 min L3 10 min L3 10 min L3   Hamstring stretch Long holds B Long holds B Long holds B Long holds B   Calf stretch At wall long holds B Incline board long holds B Incline board long holds B Incline board long holds B   Heel slides       SLR  3 x 10 L 3 x 10 L    LAQ  5# 40x 5# 40x 5# 30x   Heel raises  30x 30x on incline board 30x on incline board   Hamstring curls  5# 30x 5# 30x 5# 30x   Static balance exercises  Tandem stance on airex, L behind, to fatigue Standing on bosu practice no UEs    Square fitter  DLS s/s 39O DLS: s/s and fwd/back 30x ea    Sidelying hip abduction    2 x 10 B   Sidelying hip adduction    W/ QS 25x L   Standing TKE    Blue 35x L   Step-ups    6\" 20x L     Modalities (15 min): Vasopneumatic cold compression to L knee, 38 degrees, moderate, for 15 minutes    Treatment/Session Assessment:  Pt was visibly fatigued today with heel raises and step-ups. Instructed to cont to wear brace at home as needed especially with the soreness after PT sessions. He had a good response to cold compression. · Compliance with Program/Exercises: Will assess as treatment progresses. · Recommendations/Intent for next treatment session: Next visit will focus on advancements to more challenging activities.   · Variance from plan of care: None    Total Treatment Duration:  PT Patient Time In/Time Out  Time In: 0815  Time Out: 0900    Ling Parra PT

## 2017-08-03 ENCOUNTER — HOSPITAL ENCOUNTER (OUTPATIENT)
Dept: PHYSICAL THERAPY | Age: 32
Discharge: HOME | End: 2017-08-03
Payer: COMMERCIAL

## 2017-08-03 NOTE — PROGRESS NOTES
Haydee Richter  : 1985 [facility] at [facility]  [address], [address], [place] [address]  [phone]   [phone]        OUTPATIENT DAILY NOTE    NAME/AGE/GENDER: Haydee Richter is a 33 y.o. male. DATE: 8/3/2017    Patient canceled for appointment today due to illness. Will plan to follow up on next scheduled visit.     Beatris Goodell, PT

## 2017-08-07 ENCOUNTER — HOSPITAL ENCOUNTER (OUTPATIENT)
Dept: PHYSICAL THERAPY | Age: 32
Discharge: HOME | End: 2017-08-07
Payer: COMMERCIAL

## 2017-08-07 PROCEDURE — 97016 VASOPNEUMATIC DEVICE THERAPY: CPT

## 2017-08-07 PROCEDURE — 97110 THERAPEUTIC EXERCISES: CPT

## 2017-08-07 NOTE — PROGRESS NOTES
Hawkw Spry  : 1985 [facility] at [facility]  [address], [address], [place]  [phone]   [phone]       OUTPATIENT PHYSICAL THERAPY:Daily Note 2017    ICD-10: Treatment Diagnosis:  Pain in left knee (M25.562), Sprain of unspecified site of left knee, subsequent encounter (S83.92XD), Difficulty in walking, not elsewhere classified (R26.2)  Precautions/Allergies:   Phenergan [promethazine]   Fall Risk Score: 1 (? 5 = High Risk)  MD Orders: PT Eval and treat MEDICAL/REFERRING DIAGNOSIS:  pain in left knee   DATE OF ONSET: early 2017  REFERRING PHYSICIAN: Maryam Vasquez MD  RETURN PHYSICIAN APPOINTMENT: 8/15/17     ASSESSMENT:  Mr. Lisa Gresham presents with L knee pain and difficulty walking after having a fall at work where he landed on his knee. Pt had an MRI which showed a chronic avulsion fracture of the tibia at the site of the ACL which is now scarred down, as well as some degeneration of the lateral meniscus. During physical exam pt had very mild laxity to the ACL, other ligaments intact though painful when stressed, and pt has a hyperextended L knee. Overall the knee is relatively stable. He demonstrates decreased range of motion, decreased strength of the L LE, and impaired balance. Pt will benefit from skilled PT to address impairments and return to PLOF. PROBLEM LIST (Impacting functional limitations):  1. Decreased Strength  2. Decreased ADL/Functional Activities  3. Decreased Ambulation Ability/Technique  4. Decreased Balance  5. Increased Pain  6. Decreased Activity Tolerance  7. Decreased Flexibility/Joint Mobility INTERVENTIONS PLANNED:  1. Balance Exercise  2. Cold  3. Cryotherapy  4. Electrical Stimulation  5. Heat  6. Home Exercise Program (HEP)  7. Manual Therapy  8. Neuromuscular Re-education/Strengthening  9. Range of Motion (ROM)  10.  Therapeutic Exercise/Strengthening   TREATMENT PLAN:  Effective Dates: 17 TO 9/27/17. Frequency/Duration: 3 times a week for 6 weeks  GOALS: (Goals have been discussed and agreed upon with patient.)  Short-Term Functional Goals: Time Frame: 3 weeks  1. Pt will be I and compliant with initial HEP  2. Pt will report 50% decrease in overall symptoms  Discharge Goals: Time Frame: 6 weeks  1. Pt will demo [place] [place] L knee flexion for improved function and ability to bend/squat  2. Pt will demo WFL B LE strength for normal function  3. Pt will report no difficulty navigating stairs  4. Pt will be able to walk any distance as needed with no restriction or modification  5. Pt will score at least 70/80 on LEFS  Rehabilitation Potential For Stated Goals: Good  Regarding Ravinder Heredia's therapy, I certify that the treatment plan above will be carried out by a therapist or under their direction. Thank you for this referral,  Poncho Aguila, PT                The information in this section was collected on 7/25/17 (except where otherwise noted). HISTORY:   History of Present Injury/Illness (Reason for Referral): Elba Davidson presents with L knee pain after he slipped and fell at work, landing on this knee. This was approximately 2 weeks ago. Happened quickly and does not remember specifically how he landed. Work took him straight to [employer], was given a knee brace. Upon follow-up, MRI was ordered and referred to PT. MRI report shows \"attenuation to the body and posterior horn of the medial meniscus\", \"flattening and degeneration\" throughout lateral meniscus, and also \"chronic appearing avulsion of the anterior tibial spine which is attached to the ACL which is degenerated and scarred. \"    Pain location: L knee, medial and posterior aspect, described as deep  Pain levels - Current: 3/10   At worst: 7/10  Description: Sharp   Increases pain: Walking long periods, stairs, getting in/out of car   Decreases pain: Ice occasionally, resting with knee propped up on pillow    Past Medical History/Comorbidities:   Mr. Tyson Min  has a past medical history of Arrhythmia; Diabetes ([facility] [address] Morbid obesity ([address] Nausea & vomiting; and Renal stone. He also has no past medical history of Aneurysm ([address] Arthritis; Asthma; Autoimmune disease ([address] CAD (coronary artery disease); Chronic kidney disease; Chronic obstructive pulmonary disease ([address] Chronic pain; Coagulation disorder ([address] Difficult intubation; GERD (gastroesophageal reflux disease); Heart failure ([address] Hypertension; Liver disease; Malignant hyperthermia due to anesthesia; Pseudocholinesterase deficiency; Psychiatric disorder; PUD (peptic ulcer disease); Seizures ([facility] [address] Stroke [facility]); Thromboembolus ([facility] [address] Thyroid disease; Unspecified adverse effect of anesthesia; or Unspecified sleep apnea. Mr. Tyson Min  has a past surgical history that includes appendectomy (1996); tonsillectomy (age 10); adenoidectomy (age13); orthopaedic; and orthopaedic (2015).     Social History/Living Environment:  Lives with family, 2 kids ages 1 and 2 months and has a dog    Prior Level of Function/Work/Activity: Works as a , does not exercise, enjoys spending time with family    Functional Limitations: Looking to go back to work with restrictions    Current Medications:       Current Outpatient Prescriptions:     metFORMIN ER 1,000 mg tr24, Take  by mouth two (2) times a day., Disp: , Rfl:    Date Last Reviewed:  8/7/2017     EXAMINATION:     Observation/Postural Assessment: Genu recurvatum on the L, visible atrophy of L quads and calf muscles    Palpation: No palpation to L knee grossly    ROM:   Knee AROM R L   Flexion 135 114   Extension 0 (8)       Strength:   LE MMT R L   Hip Flexion 5 5   Hip ER 5 5   Hip IR 5 5   Hip Abduction 4+ 4   Hip Extension 4+ 4   Knee Flexion 5 4   Knee Extension 5 5   Ankle DF 5 5   Ankle PF 3 2+      Heel raises: R: performs 7-8 before losing form  L: Cannot perform full range single leg heel raise    Functional Measures:    Gait: Mostly normal, makes effort not to come down hard on L foot    Special Tests:    Valgus: R: (-)  L: (-) for laxity, painful   Varus:  R: (-)  L: (-) for laxity, painful   Lachman's: R: (-)  L: very mild, almost insignificant laxity   Posterior drawer: (-) B      Balance:  SLS:  R: 30s  L: 9s     CLINICAL DECISION MAKING:   Outcome Measure: Tool Used: Lower Extremity Functional Scale (LEFS)  Score:  Initial: 56/80 Most Recent: X/80 (Date: -- )   Interpretation of Score: 20 questions each scored on a 5 point scale with 0 representing \"extreme difficulty or unable to perform\" and 4 representing \"no difficulty\". The lower the score, the greater the functional disability. 80/80 represents no disability. Minimal detectable change is 9 points. Medical Necessity:   · Patient demonstrates good rehab potential due to pain, difficulty walking, difficulty with stairs, decreased range of motion and strength, and higher previous functional level. Reason for Services/Other Comments:  · Patient continues to require skilled intervention due to decreased functional status. TREATMENT:   (In addition to Assessment/Re-Assessment sessions the following treatments were rendered)      Present Symptoms/Complaints - 8/7/2017: Pt reports he was sick all weekend so didn't get to do any exercise. Pain: Initial:   Pain Intensity 1: 2  Post Session:  unchanged       Therapeutic Exercise: (30 minutes):  Exercises per grid below to improve mobility, strength and balance. Required moderate visual, verbal and tactile cues to promote proper body alignment, promote proper body posture and promote proper body mechanics. Progressed resistance, range and repetitions as indicated.      Date:  7/25/17 Date:  7/27/17 Date:  7/31/17 Date:  8/1/17 Date:  8/7/17   Activity/Exercise Parameters Parameters Parameters     Pt education Findings, anatomy/pathology, POC, HEP       Recumbent bike  10 min L3 10 min L3 10 min L3 10 min L3 Hamstring stretch Long holds B Long holds B Long holds B Long holds B Long holds B   Calf stretch At wall long holds B Incline board long holds B Incline board long holds B Incline board long holds B Incline board long holds B   Heel slides        SLR  3 x 10 L 3 x 10 L  3 x 12 L   LAQ  5# 40x 5# 40x 5# 30x    Heel raises  30x 30x on incline board 30x on incline board 30x on incline board   Hamstring curls  5# 30x 5# 30x 5# 30x 6# 35x   Static balance exercises  Tandem stance on airex, L behind, to fatigue Standing on bosu practice no UEs     Square fitter  DLS s/s 95I DLS: s/s and fwd/back 30x ea  DLS: s/s and fwd/back 30x ea   Sidelying hip abduction    2 x 10 B    Sidelying hip adduction    W/ QS 25x L    Standing TKE    Blue 35x L    Step-ups    6\" 20x L 6\" 20x L   Tandem walk     50'x2     Modalities (15 min): Vasopneumatic cold compression to L knee, 34 degrees, moderate, for 15 minutes    Treatment/Session Assessment:  Pt cont to be fatigued with strengthening exercises but was able to make modest progressions in reps and weight today. He has good control of his knee with square fitter. · Compliance with Program/Exercises: Will assess as treatment progresses. · Recommendations/Intent for next treatment session: Next visit will focus on advancements to more challenging activities.   · Variance from plan of care: None    Total Treatment Duration:  PT Patient Time In/Time Out  Time In: 1540  Time Out: [address], [address], PT

## 2017-08-08 ENCOUNTER — HOSPITAL ENCOUNTER (OUTPATIENT)
Dept: PHYSICAL THERAPY | Age: 32
Discharge: HOME | End: 2017-08-08
Payer: COMMERCIAL

## 2017-08-08 PROCEDURE — 97110 THERAPEUTIC EXERCISES: CPT

## 2017-08-08 PROCEDURE — 97016 VASOPNEUMATIC DEVICE THERAPY: CPT

## 2017-08-08 NOTE — PROGRESS NOTES
Danita Gonzalez  : 1985 [facility] at [facility]  [address], [address], [place] [address]  [phone]   [phone]       OUTPATIENT PHYSICAL THERAPY:Daily Note 2017    ICD-10: Treatment Diagnosis:  Pain in left knee (M25.562), Sprain of unspecified site of left knee, subsequent encounter (S83.92XD), Difficulty in walking, not elsewhere classified (R26.2)  Precautions/Allergies:   Phenergan [promethazine]   Fall Risk Score: 1 (? 5 = High Risk)  MD Orders: PT Eval and treat MEDICAL/REFERRING DIAGNOSIS:  pain in left knee   DATE OF ONSET: early 2017  REFERRING PHYSICIAN: Kamran Phipps MD  RETURN PHYSICIAN APPOINTMENT: 8/15/17     ASSESSMENT:  Mr. Dank Barroso presents with L knee pain and difficulty walking after having a fall at work where he landed on his knee. Pt had an MRI which showed a chronic avulsion fracture of the tibia at the site of the ACL which is now scarred down, as well as some degeneration of the lateral meniscus. During physical exam pt had very mild laxity to the ACL, other ligaments intact though painful when stressed, and pt has a hyperextended L knee. Overall the knee is relatively stable. He demonstrates decreased range of motion, decreased strength of the L LE, and impaired balance. Pt will benefit from skilled PT to address impairments and return to PLOF. PROBLEM LIST (Impacting functional limitations):  1. Decreased Strength  2. Decreased ADL/Functional Activities  3. Decreased Ambulation Ability/Technique  4. Decreased Balance  5. Increased Pain  6. Decreased Activity Tolerance  7. Decreased Flexibility/Joint Mobility INTERVENTIONS PLANNED:  1. Balance Exercise  2. Cold  3. Cryotherapy  4. Electrical Stimulation  5. Heat  6. Home Exercise Program (HEP)  7. Manual Therapy  8. Neuromuscular Re-education/Strengthening  9. Range of Motion (ROM)  10.  Therapeutic Exercise/Strengthening   TREATMENT PLAN:  Effective Dates: 17 TO 9/27/17. Frequency/Duration: 3 times a week for 6 weeks  GOALS: (Goals have been discussed and agreed upon with patient.)  Short-Term Functional Goals: Time Frame: 3 weeks  1. Pt will be I and compliant with initial HEP  2. Pt will report 50% decrease in overall symptoms  Discharge Goals: Time Frame: 6 weeks  1. Pt will demo [place] [place] L knee flexion for improved function and ability to bend/squat  2. Pt will demo WFL B LE strength for normal function  3. Pt will report no difficulty navigating stairs  4. Pt will be able to walk any distance as needed with no restriction or modification  5. Pt will score at least 70/80 on LEFS  Rehabilitation Potential For Stated Goals: Good  Regarding Ravinder Heredia's therapy, I certify that the treatment plan above will be carried out by a therapist or under their direction. Thank you for this referral,  Deloris Herrera, PT                The information in this section was collected on 7/25/17 (except where otherwise noted). HISTORY:   History of Present Injury/Illness (Reason for Referral): Paulette Marvin presents with L knee pain after he slipped and fell at work, landing on this knee. This was approximately 2 weeks ago. Happened quickly and does not remember specifically how he landed. Work took him straight to [employer], was given a knee brace. Upon follow-up, MRI was ordered and referred to PT. MRI report shows \"attenuation to the body and posterior horn of the medial meniscus\", \"flattening and degeneration\" throughout lateral meniscus, and also \"chronic appearing avulsion of the anterior tibial spine which is attached to the ACL which is degenerated and scarred. \"    Pain location: L knee, medial and posterior aspect, described as deep  Pain levels - Current: 3/10   At worst: 7/10  Description: Sharp   Increases pain: Walking long periods, stairs, getting in/out of car   Decreases pain: Ice occasionally, resting with knee propped up on pillow    Past Medical History/Comorbidities:   Mr. Paola Wisdom  has a past medical history of Arrhythmia; Diabetes ([facility] [address] Morbid obesity ([address] Nausea & vomiting; and Renal stone. He also has no past medical history of Aneurysm ([address] Arthritis; Asthma; Autoimmune disease ([address] CAD (coronary artery disease); Chronic kidney disease; Chronic obstructive pulmonary disease ([address] Chronic pain; Coagulation disorder ([address] Difficult intubation; GERD (gastroesophageal reflux disease); Heart failure ([address] Hypertension; Liver disease; Malignant hyperthermia due to anesthesia; Pseudocholinesterase deficiency; Psychiatric disorder; PUD (peptic ulcer disease); Seizures ([facility] [address] Stroke [facility]); Thromboembolus ([facility] [address] Thyroid disease; Unspecified adverse effect of anesthesia; or Unspecified sleep apnea. Mr. Paola Wisdom  has a past surgical history that includes appendectomy (1996); tonsillectomy (age 10); adenoidectomy (age13); orthopaedic; and orthopaedic (2015).     Social History/Living Environment:  Lives with family, 2 kids ages 1 and 2 months and has a dog    Prior Level of Function/Work/Activity: Works as a , does not exercise, enjoys spending time with family    Functional Limitations: Looking to go back to work with restrictions    Current Medications:       Current Outpatient Prescriptions:     metFORMIN ER 1,000 mg tr24, Take  by mouth two (2) times a day., Disp: , Rfl:    Date Last Reviewed:  8/8/2017     EXAMINATION:     Observation/Postural Assessment: Genu recurvatum on the L, visible atrophy of L quads and calf muscles    Palpation: No palpation to L knee grossly    ROM:   Knee AROM R L   Flexion 135 114   Extension 0 (8)       Strength:   LE MMT R L   Hip Flexion 5 5   Hip ER 5 5   Hip IR 5 5   Hip Abduction 4+ 4   Hip Extension 4+ 4   Knee Flexion 5 4   Knee Extension 5 5   Ankle DF 5 5   Ankle PF 3 2+      Heel raises: R: performs 7-8 before losing form  L: Cannot perform full range single leg heel raise    Functional Measures:    Gait: Mostly normal, makes effort not to come down hard on L foot    Special Tests:    Valgus: R: (-)  L: (-) for laxity, painful   Varus:  R: (-)  L: (-) for laxity, painful   Lachman's: R: (-)  L: very mild, almost insignificant laxity   Posterior drawer: (-) B      Balance:  SLS:  R: 30s  L: 9s     CLINICAL DECISION MAKING:   Outcome Measure: Tool Used: Lower Extremity Functional Scale (LEFS)  Score:  Initial: 56/80 Most Recent: X/80 (Date: -- )   Interpretation of Score: 20 questions each scored on a 5 point scale with 0 representing \"extreme difficulty or unable to perform\" and 4 representing \"no difficulty\". The lower the score, the greater the functional disability. 80/80 represents no disability. Minimal detectable change is 9 points. Medical Necessity:   · Patient demonstrates good rehab potential due to pain, difficulty walking, difficulty with stairs, decreased range of motion and strength, and higher previous functional level. Reason for Services/Other Comments:  · Patient continues to require skilled intervention due to decreased functional status. TREATMENT:   (In addition to Assessment/Re-Assessment sessions the following treatments were rendered)      Present Symptoms/Complaints - 8/8/2017: Pt reports he is sore in his knee joint from yesterday's session. Pain: Initial:   Pain Intensity 1: 5  Post Session:  unchanged       Therapeutic Exercise: (30 minutes):  Exercises per grid below to improve mobility, strength and balance. Required moderate visual, verbal and tactile cues to promote proper body alignment, promote proper body posture and promote proper body mechanics. Progressed resistance, range and repetitions as indicated.      Date:  7/25/17 Date:  7/27/17 Date:  7/31/17 Date:  8/1/17 Date:  8/7/17 Date:  8/8/17   Activity/Exercise Parameters Parameters Parameters      Pt education Findings, anatomy/pathology, POC, HEP        Recumbent bike  10 min L3 10 min L3 10 min L3 10 min L3 10 min L3   Hamstring stretch Long holds B Long holds B Long holds B Long holds B Long holds B Long holds B   Calf stretch At wall long holds B Incline board long holds B Incline board long holds B Incline board long holds B Incline board long holds B Incline board long holds B   Heel slides         SLR  3 x 10 L 3 x 10 L  3 x 12 L 1# 3 x 10   LAQ  5# 40x 5# 40x 5# 30x     Heel raises  30x 30x on incline board 30x on incline board 30x on incline board    Hamstring curls  5# 30x 5# 30x 5# 30x 6# 35x 6# 30x   Static balance exercises  Tandem stance on airex, L behind, to fatigue Standing on bosu practice no UEs      Square fitter  DLS s/s 49O DLS: s/s and fwd/back 30x ea  DLS: s/s and fwd/back 30x ea    Sidelying hip abduction    2 x 10 B  2 x 10 B   Sidelying hip adduction    W/ QS 25x L  2 x 10 B   Standing TKE    Blue 35x L     Step-ups    6\" 20x L 6\" 20x L    Tandem walk     50'x2    Clamshell      2 x 10 B   Prone hip extension      2 x 10 B     Modalities (15 min): Vasopneumatic cold compression to L knee, 34 degrees, moderate, for 15 minutes    Treatment/Session Assessment:  Pt did well with mat exercises today though abduction and adduction on the L are challenging. · Compliance with Program/Exercises: Will assess as treatment progresses. · Recommendations/Intent for next treatment session: Next visit will focus on advancements to more challenging activities.   · Variance from plan of care: None    Total Treatment Duration:  PT Patient Time In/Time Out  Time In: 1025  Time Out: [address] [facility],

## 2017-08-09 ENCOUNTER — APPOINTMENT (OUTPATIENT)
Dept: PHYSICAL THERAPY | Age: 32
End: 2017-08-09
Payer: COMMERCIAL

## 2017-08-10 ENCOUNTER — HOSPITAL ENCOUNTER (OUTPATIENT)
Dept: PHYSICAL THERAPY | Age: 32
Discharge: HOME | End: 2017-08-10
Payer: COMMERCIAL

## 2017-08-10 PROCEDURE — 97014 ELECTRIC STIMULATION THERAPY: CPT

## 2017-08-10 PROCEDURE — 97016 VASOPNEUMATIC DEVICE THERAPY: CPT

## 2017-08-10 PROCEDURE — 97110 THERAPEUTIC EXERCISES: CPT

## 2017-08-10 NOTE — PROGRESS NOTES
Alivia Camacho  : 1985 [facility]  [address], [address], [place]  [phone]   [phone]       OUTPATIENT PHYSICAL THERAPY:Daily Note 8/10/2017    ICD-10: Treatment Diagnosis:  Pain in left knee (M25.562), Sprain of unspecified site of left knee, subsequent encounter (S83.92XD), Difficulty in walking, not elsewhere classified (R26.2)  Precautions/Allergies:   Phenergan [promethazine]   Fall Risk Score: 1 (? 5 = High Risk)  MD Orders: PT Eval and treat MEDICAL/REFERRING DIAGNOSIS:  pain in left knee   DATE OF ONSET: early 2017  REFERRING PHYSICIAN: Fatuma Ann MD  RETURN PHYSICIAN APPOINTMENT: 8/15/17     ASSESSMENT:  Mr. Manuel Chakraborty presents with L knee pain and difficulty walking after having a fall at work where he landed on his knee. Pt had an MRI which showed a chronic avulsion fracture of the tibia at the site of the ACL which is now scarred down, as well as some degeneration of the lateral meniscus. During physical exam pt had very mild laxity to the ACL, other ligaments intact though painful when stressed, and pt has a hyperextended L knee. Overall the knee is relatively stable. He demonstrates decreased range of motion, decreased strength of the L LE, and impaired balance. Pt will benefit from skilled PT to address impairments and return to PLOF. PROBLEM LIST (Impacting functional limitations):  1. Decreased Strength  2. Decreased ADL/Functional Activities  3. Decreased Ambulation Ability/Technique  4. Decreased Balance  5. Increased Pain  6. Decreased Activity Tolerance  7. Decreased Flexibility/Joint Mobility INTERVENTIONS PLANNED:  1. Balance Exercise  2. Cold  3. Cryotherapy  4. Electrical Stimulation  5. Heat  6. Home Exercise Program (HEP)  7. Manual Therapy  8. Neuromuscular Re-education/Strengthening  9. Range of Motion (ROM)  10.  Therapeutic Exercise/Strengthening   TREATMENT PLAN:  Effective Dates: 17 TO 9/27/17. Frequency/Duration: 3 times a week for 6 weeks  GOALS: (Goals have been discussed and agreed upon with patient.)  Short-Term Functional Goals: Time Frame: 3 weeks  1. Pt will be I and compliant with initial HEP  2. Pt will report 50% decrease in overall symptoms  Discharge Goals: Time Frame: 6 weeks  1. Pt will demo [place] [place] L knee flexion for improved function and ability to bend/squat  2. Pt will demo WFL B LE strength for normal function  3. Pt will report no difficulty navigating stairs  4. Pt will be able to walk any distance as needed with no restriction or modification  5. Pt will score at least 70/80 on LEFS  Rehabilitation Potential For Stated Goals: Good  Regarding Ravinder Heredia's therapy, I certify that the treatment plan above will be carried out by a therapist or under their direction. Thank you for this referral,  Dennis Omalley, PT                The information in this section was collected on 7/25/17 (except where otherwise noted). HISTORY:   History of Present Injury/Illness (Reason for Referral): Trevon Mclaughlin presents with L knee pain after he slipped and fell at work, landing on this knee. This was approximately 2 weeks ago. Happened quickly and does not remember specifically how he landed. Work took him straight to [employer], was given a knee brace. Upon follow-up, MRI was ordered and referred to PT. MRI report shows \"attenuation to the body and posterior horn of the medial meniscus\", \"flattening and degeneration\" throughout lateral meniscus, and also \"chronic appearing avulsion of the anterior tibial spine which is attached to the ACL which is degenerated and scarred. \"    Pain location: L knee, medial and posterior aspect, described as deep  Pain levels - Current: 3/10   At worst: 7/10  Description: Sharp   Increases pain: Walking long periods, stairs, getting in/out of car   Decreases pain: Ice occasionally, resting with knee propped up on pillow    Past Medical History/Comorbidities:   Mr. Dung Duarte  has a past medical history of Arrhythmia; Diabetes ([facility] [address] Morbid obesity ([address] Nausea & vomiting; and Renal stone. He also has no past medical history of Aneurysm ([address] Arthritis; Asthma; Autoimmune disease ([address] CAD (coronary artery disease); Chronic kidney disease; Chronic obstructive pulmonary disease ([address] Chronic pain; Coagulation disorder ([address] Difficult intubation; GERD (gastroesophageal reflux disease); Heart failure ([address] Hypertension; Liver disease; Malignant hyperthermia due to anesthesia; Pseudocholinesterase deficiency; Psychiatric disorder; PUD (peptic ulcer disease); Seizures ([facility] [address] Stroke [facility]); Thromboembolus ([facility] [address] Thyroid disease; Unspecified adverse effect of anesthesia; or Unspecified sleep apnea. Mr. Dung Duarte  has a past surgical history that includes appendectomy (1996); tonsillectomy (age 10); adenoidectomy (age13); orthopaedic; and orthopaedic (2015).     Social History/Living Environment:  Lives with family, 2 kids ages 1 and 2 months and has a dog    Prior Level of Function/Work/Activity: Works as a , does not exercise, enjoys spending time with family    Functional Limitations: Looking to go back to work with restrictions    Current Medications:       Current Outpatient Prescriptions:     metFORMIN ER 1,000 mg tr24, Take  by mouth two (2) times a day., Disp: , Rfl:    Date Last Reviewed:  8/10/2017     EXAMINATION:     Observation/Postural Assessment: Genu recurvatum on the L, visible atrophy of L quads and calf muscles    Palpation: No palpation to L knee grossly    ROM:   Knee AROM R L   Flexion 135 114   Extension 0 (8)       Strength:   LE MMT R L   Hip Flexion 5 5   Hip ER 5 5   Hip IR 5 5   Hip Abduction 4+ 4   Hip Extension 4+ 4   Knee Flexion 5 4   Knee Extension 5 5   Ankle DF 5 5   Ankle PF 3 2+      Heel raises: R: performs 7-8 before losing form  L: Cannot perform full range single leg heel raise    Functional Measures:    Gait: Mostly normal, makes effort not to come down hard on L foot    Special Tests:    Valgus: R: (-)  L: (-) for laxity, painful   Varus:  R: (-)  L: (-) for laxity, painful   Lachman's: R: (-)  L: very mild, almost insignificant laxity   Posterior drawer: (-) B      Balance:  SLS:  R: 30s  L: 9s     CLINICAL DECISION MAKING:   Outcome Measure: Tool Used: Lower Extremity Functional Scale (LEFS)  Score:  Initial: 56/80 Most Recent: X/80 (Date: -- )   Interpretation of Score: 20 questions each scored on a 5 point scale with 0 representing \"extreme difficulty or unable to perform\" and 4 representing \"no difficulty\". The lower the score, the greater the functional disability. 80/80 represents no disability. Minimal detectable change is 9 points. Medical Necessity:   · Patient demonstrates good rehab potential due to pain, difficulty walking, difficulty with stairs, decreased range of motion and strength, and higher previous functional level. Reason for Services/Other Comments:  · Patient continues to require skilled intervention due to decreased functional status. TREATMENT:   (In addition to Assessment/Re-Assessment sessions the following treatments were rendered)      Present Symptoms/Complaints - 8/10/2017: Pt reports his knee is in pain because he did a lot of walking at a football game yesterday. Pain: Initial:   Pain Intensity 1: 5  Post Session:  unchanged       Therapeutic Exercise: (30 minutes):  Exercises per grid below to improve mobility, strength and balance. Required moderate visual, verbal and tactile cues to promote proper body alignment, promote proper body posture and promote proper body mechanics. Progressed resistance, range and repetitions as indicated.      Date:  7/27/17 Date:  7/31/17 Date:  8/1/17 Date:  8/7/17 Date:  8/8/17 Date:  8/10/17   Activity/Exercise Parameters Parameters       Pt education         Recumbent bike 10 min L3 10 min L3 10 min L3 10 min L3 10 min L3 10 min L3   Hamstring stretch Long holds B Long holds B Long holds B Long holds B Long holds B Long holds B   Calf stretch Incline board long holds B Incline board long holds B Incline board long holds B Incline board long holds B Incline board long holds B Incline board long holds B   Heel slides      1.5# 3 x 10 B   SLR 3 x 10 L 3 x 10 L  3 x 12 L 1# 3 x 10    LAQ 5# 40x 5# 40x 5# 30x      Heel raises 30x 30x on incline board 30x on incline board 30x on incline board  30x on incline board   Hamstring curls 5# 30x 5# 30x 5# 30x 6# 35x 6# 30x 7.5# 3 x 10   Static balance exercises Tandem stance on airex, L behind, to fatigue Standing on bosu practice no UEs       Square fitter DLS s/s 37W DLS: s/s and fwd/back 30x ea  DLS: s/s and fwd/back 30x ea     Sidelying hip abduction   2 x 10 B  2 x 10 B    Sidelying hip adduction   W/ QS 25x L  2 x 10 B    Standing TKE   Blue 35x L      Step-ups   6\" 20x L 6\" 20x L     Tandem walk    50'x2     Clamshell     2 x 10 B    Prone hip extension     2 x 10 B    Shuttle press      125# 3 x 10     Modalities (30 min):   - Electrical stimulation via H-wave thru knee joint, high frequency, intensity modified to pt tolerance, 20 min  Vasopneumatic cold compression to L knee, 34 degrees, high, for 15 minutes    Treatment/Session Assessment:  Pt did not have much if any pain relief with e-stim treatment, however pain was down to 3/10 after ice. Pt able to progress weights with mat exercises today. · Compliance with Program/Exercises: Will assess as treatment progresses. · Recommendations/Intent for next treatment session: Next visit will focus on advancements to more challenging activities.   · Variance from plan of care: None    Total Treatment Duration:  PT Patient Time In/Time Out  Time In: 1600  Time Out: [facility], PT

## 2017-08-14 ENCOUNTER — HOSPITAL ENCOUNTER (OUTPATIENT)
Dept: PHYSICAL THERAPY | Age: 32
Discharge: HOME | End: 2017-08-14
Payer: COMMERCIAL

## 2017-08-14 PROCEDURE — 97016 VASOPNEUMATIC DEVICE THERAPY: CPT

## 2017-08-14 PROCEDURE — 97110 THERAPEUTIC EXERCISES: CPT

## 2017-08-15 ENCOUNTER — APPOINTMENT (OUTPATIENT)
Dept: PHYSICAL THERAPY | Age: 32
End: 2017-08-15
Payer: COMMERCIAL

## 2017-08-16 ENCOUNTER — HOSPITAL ENCOUNTER (OUTPATIENT)
Dept: PHYSICAL THERAPY | Age: 32
Discharge: HOME | End: 2017-08-16
Payer: COMMERCIAL

## 2017-08-17 ENCOUNTER — HOSPITAL ENCOUNTER (OUTPATIENT)
Dept: PHYSICAL THERAPY | Age: 32
Discharge: HOME | End: 2017-08-17
Payer: COMMERCIAL

## 2017-08-17 ENCOUNTER — APPOINTMENT (OUTPATIENT)
Dept: PHYSICAL THERAPY | Age: 32
End: 2017-08-17
Payer: COMMERCIAL

## 2017-08-17 PROCEDURE — 97016 VASOPNEUMATIC DEVICE THERAPY: CPT

## 2017-08-17 PROCEDURE — 97110 THERAPEUTIC EXERCISES: CPT

## 2017-08-17 NOTE — PROGRESS NOTES
Peter Ruizas  : 1985 [facility]  [address], [address], [place]  [phone]   [phone]       OUTPATIENT PHYSICAL THERAPY:Daily Note 2017    ICD-10: Treatment Diagnosis:  Pain in left knee (M25.562), Sprain of unspecified site of left knee, subsequent encounter (S83.92XD), Difficulty in walking, not elsewhere classified (R26.2)  Precautions/Allergies:   Phenergan [promethazine]   Fall Risk Score: 1 (? 5 = High Risk)  MD Orders: PT Eval and treat MEDICAL/REFERRING DIAGNOSIS:  pain in left knee   DATE OF ONSET: early 2017  REFERRING PHYSICIAN: Evelio Mueller MD  RETURN PHYSICIAN APPOINTMENT: 8/15/17     ASSESSMENT:  Mr. Paola Wisdom has participated in 8 visits of skilled PT. He demonstrates increased L knee flexion AROM and is steadily increasing resistance with therapeutic exercises. Pt has been having pain with activity and had some pain and apparent decreased stability of the L patella which was improved with taping. Pt has little difficulty walking and standing long periods, but it can cause increased pain. Pt was instructed to cont to wear his brace. Pt will benefit from cont skilled PT to reach his goals. PROBLEM LIST (Impacting functional limitations):  1. Decreased Strength  2. Decreased ADL/Functional Activities  3. Decreased Ambulation Ability/Technique  4. Decreased Balance  5. Increased Pain  6. Decreased Activity Tolerance  7. Decreased Flexibility/Joint Mobility INTERVENTIONS PLANNED:  1. Balance Exercise  2. Cold  3. Cryotherapy  4. Electrical Stimulation  5. Heat  6. Home Exercise Program (HEP)  7. Manual Therapy  8. Neuromuscular Re-education/Strengthening  9. Range of Motion (ROM)  10. Therapeutic Exercise/Strengthening   TREATMENT PLAN:  Effective Dates: 17 TO 17.   Frequency/Duration: 3 times a week for 6 weeks  GOALS: (Goals have been discussed and agreed upon with patient.)  Short-Term Functional Goals: Time Frame: 3 weeks  1. Pt will be I and compliant with initial HEP  2. Pt will report 50% decrease in overall symptoms  Discharge Goals: Time Frame: 6 weeks  1. Pt will demo [place] [place] L knee flexion for improved function and ability to bend/squat  2. Pt will demo WFL B LE strength for normal function  3. Pt will report no difficulty navigating stairs  4. Pt will be able to walk any distance as needed with no restriction or modification  5. Pt will score at least 70/80 on LEFS  Rehabilitation Potential For Stated Goals: Good  Regarding Ravinder Heredia's therapy, I certify that the treatment plan above will be carried out by a therapist or under their direction. Thank you for this referral,  Poncho Aguila, PT               The information in this section was collected on 7/25/17 (except where otherwise noted). HISTORY:   History of Present Injury/Illness (Reason for Referral): Elba Davidson presents with L knee pain after he slipped and fell at work, landing on this knee. This was approximately 2 weeks ago. Happened quickly and does not remember specifically how he landed. Work took him straight to [employer], was given a knee brace. Upon follow-up, MRI was ordered and referred to PT. MRI report shows \"attenuation to the body and posterior horn of the medial meniscus\", \"flattening and degeneration\" throughout lateral meniscus, and also \"chronic appearing avulsion of the anterior tibial spine which is attached to the ACL which is degenerated and scarred. \"    Pain location: L knee, medial and posterior aspect, described as deep  Pain levels - Current: 3/10   At worst: 7/10  Description: Sharp   Increases pain: Walking long periods, stairs, getting in/out of car   Decreases pain: Ice occasionally, resting with knee propped up on pillow    Past Medical History/Comorbidities:   Mr. Aubrey Rogers  has a past medical history of Arrhythmia; Diabetes ([address] Morbid obesity ([address] Nausea & vomiting; and Renal stone.  He also has no past medical history of Aneurysm ([facility] [address] Arthritis; Asthma; Autoimmune disease ([facility] [address] CAD (coronary artery disease); Chronic kidney disease; Chronic obstructive pulmonary disease ([facility] [address] Chronic pain; Coagulation disorder ([facility] [address] Difficult intubation; GERD (gastroesophageal reflux disease); Heart failure ([facility] [address] Hypertension; Liver disease; Malignant hyperthermia due to anesthesia; Pseudocholinesterase deficiency; Psychiatric disorder; PUD (peptic ulcer disease); Seizures ([facility] [address] Stroke [facility]); Thromboembolus ([facility] [address] Thyroid disease; Unspecified adverse effect of anesthesia; or Unspecified sleep apnea. Mr. Mikaela Gomez  has a past surgical history that includes appendectomy (1996); tonsillectomy (age 10); adenoidectomy (age13); orthopaedic; and orthopaedic (2015).     Social History/Living Environment:  Lives with family, 2 kids ages 1 and 2 months and has a dog    Prior Level of Function/Work/Activity: Works as a , does not exercise, enjoys spending time with family    Functional Limitations: Looking to go back to work with restrictions    Current Medications:       Current Outpatient Prescriptions:     metFORMIN ER 1,000 mg tr24, Take  by mouth two (2) times a day., Disp: , Rfl:    Date Last Reviewed:  8/17/2017     EXAMINATION:     Observation/Postural Assessment: Genu recurvatum on the L, visible atrophy of L quads and calf muscles    Palpation: No palpation to L knee grossly    ROM:   Knee AROM R L   Flexion 135 114   Extension 0 (8)     8/14/17 L knee flexion: 120    Strength:   LE MMT R L   Hip Flexion 5 5   Hip ER 5 5   Hip IR 5 5   Hip Abduction 4+ 4   Hip Extension 4+ 4   Knee Flexion 5 4   Knee Extension 5 5   Ankle DF 5 5   Ankle PF 3 2+      Heel raises: R: performs 7-8 before losing form  L: Cannot perform full range single leg heel raise  8/14/17: Calf strength remains the same    Functional Measures:    Gait: Mostly normal, makes effort not to come down hard on L foot    Special Tests:    Valgus: R: (-)  L: (-) for laxity, painful   Varus:  R: (-)  L: (-) for laxity, painful   Lachman's: R: (-)  L: very mild, almost insignificant laxity   Posterior drawer: (-) B      Balance:  SLS:  R: 30s  L: 9s     CLINICAL DECISION MAKING:   Outcome Measure: Tool Used: Lower Extremity Functional Scale (LEFS)  Score:  Initial: 56/80 Most Recent: X/80 (Date: -- )   Interpretation of Score: 20 questions each scored on a 5 point scale with 0 representing \"extreme difficulty or unable to perform\" and 4 representing \"no difficulty\". The lower the score, the greater the functional disability. 80/80 represents no disability. Minimal detectable change is 9 points. Medical Necessity:   · Patient demonstrates good rehab potential due to pain, difficulty walking, difficulty with stairs, decreased range of motion and strength, and higher previous functional level. Reason for Services/Other Comments:  · Patient continues to require skilled intervention due to decreased functional status. TREATMENT:   (In addition to Assessment/Re-Assessment sessions the following treatments were rendered)      Present Symptoms/Complaints - 8/17/2017: Pt reports he saw the MD and he is going back to work starting today. He is still having pain. Pain: Initial:   Pain Intensity 1: 4  Post Session:  unchanged       Therapeutic Exercise: (30 minutes):  Exercises per grid below to improve mobility, strength and balance. Required moderate visual, verbal and tactile cues to promote proper body alignment, promote proper body posture and promote proper body mechanics. Progressed resistance, range and repetitions as indicated.      Date:  8/1/17 Date:  8/7/17 Date:  8/8/17 Date:  8/10/17 Date:  8/14/17 Date:  8/17/17   Activity/Exercise         Pt education         Recumbent bike 10 min L3 10 min L3 10 min L3 10 min L3 5 min before tape and 5 min after 10 min   Hamstring stretch Long holds B Long holds B Long holds B Long holds B     Calf stretch Incline board long holds B Incline board long holds B Incline board long holds B Incline board long holds B     SLR  3 x 12 L 1# 3 x 10 1.5# 3 x 10 B 2# 3 x 10 2# 3 x 10   LAQ 5# 30x        Heel raises 30x on incline board 30x on incline board  30x on incline board 2 x 20 on step    Hamstring curls 5# 30x 6# 35x 6# 30x 7.5# 3 x 10 7.5# 30x 10# 30x   Static balance exercises         Square fitter  DLS: s/s and fwd/back 30x ea       Sidelying hip abduction 2 x 10 B  2 x 10 B  1# 3 x 10 B 2# 2 x 10 B   Sidelying hip adduction W/ QS 25x L  2 x 10 B  1# 3 x 10 L 2# 2 x 10 L   Standing TKE Blue 35x L        Step-ups 6\" 20x L 6\" 20x L       Tandem walk  50'x2       Clamshell   2 x 10 B      Prone hip extension   2 x 10 B      Shuttle press    125# 3 x 10  150# 3 x 10   Kumar tape     Lateral to medial L patella Lateral to medial L patella     Modalities (15 min):   Vasopneumatic cold compression to L knee, 34 degrees, high, for 15 minutes    Treatment/Session Assessment:  Mat exercises were done today to avoid irritating pt's knee for work today. Resistance was added. Instructed pt to make sure he wears his brace at work. · Compliance with Program/Exercises: Good  · Recommendations/Intent for next treatment session: Next visit will focus on advancements to more challenging activities.   · Variance from plan of care: None    Total Treatment Duration:  PT Patient Time In/Time Out  Time In: 0730  Time Out: 0815    Brody Terrell PT

## 2017-08-23 ENCOUNTER — HOSPITAL ENCOUNTER (OUTPATIENT)
Dept: PHYSICAL THERAPY | Age: 32
Discharge: HOME | End: 2017-08-23
Payer: COMMERCIAL

## 2017-08-23 PROCEDURE — 97016 VASOPNEUMATIC DEVICE THERAPY: CPT

## 2017-08-23 PROCEDURE — 97110 THERAPEUTIC EXERCISES: CPT

## 2017-08-23 NOTE — PROGRESS NOTES
Paras Figueroayongvirginia  : 1985 [facility]  [address], [address], [place]  [phone]   [phone]       OUTPATIENT PHYSICAL THERAPY:Daily Note 2017    ICD-10: Treatment Diagnosis:  Pain in left knee (M25.562), Sprain of unspecified site of left knee, subsequent encounter (S83.92XD), Difficulty in walking, not elsewhere classified (R26.2)  Precautions/Allergies:   Phenergan [promethazine]   Fall Risk Score: 1 (? 5 = High Risk)  MD Orders: PT Eval and treat MEDICAL/REFERRING DIAGNOSIS:  pain in left knee   DATE OF ONSET: early 2017  REFERRING PHYSICIAN: Stefany Blevins MD  RETURN PHYSICIAN APPOINTMENT: 8/15/17     ASSESSMENT:  Mr. Laurie Arellano has participated in 8 visits of skilled PT. He demonstrates increased L knee flexion AROM and is steadily increasing resistance with therapeutic exercises. Pt has been having pain with activity and had some pain and apparent decreased stability of the L patella which was improved with taping. Pt has little difficulty walking and standing long periods, but it can cause increased pain. Pt was instructed to cont to wear his brace. Pt will benefit from cont skilled PT to reach his goals. PROBLEM LIST (Impacting functional limitations):  1. Decreased Strength  2. Decreased ADL/Functional Activities  3. Decreased Ambulation Ability/Technique  4. Decreased Balance  5. Increased Pain  6. Decreased Activity Tolerance  7. Decreased Flexibility/Joint Mobility INTERVENTIONS PLANNED:  1. Balance Exercise  2. Cold  3. Cryotherapy  4. Electrical Stimulation  5. Heat  6. Home Exercise Program (HEP)  7. Manual Therapy  8. Neuromuscular Re-education/Strengthening  9. Range of Motion (ROM)  10. Therapeutic Exercise/Strengthening   TREATMENT PLAN:  Effective Dates: 17 TO 17.   Frequency/Duration: 3 times a week for 6 weeks  GOALS: (Goals have been discussed and agreed upon with patient.)  Short-Term Functional Goals: Time Frame: 3 weeks  1. Pt will be I and compliant with initial HEP  2. Pt will report 50% decrease in overall symptoms  Discharge Goals: Time Frame: 6 weeks  1. Pt will demo [place] [place] L knee flexion for improved function and ability to bend/squat  2. Pt will demo WFL B LE strength for normal function  3. Pt will report no difficulty navigating stairs  4. Pt will be able to walk any distance as needed with no restriction or modification  5. Pt will score at least 70/80 on LEFS  Rehabilitation Potential For Stated Goals: Good  Regarding Ravinder Heredia's therapy, I certify that the treatment plan above will be carried out by a therapist or under their direction. Thank you for this referral,  Piotr Brush, PT               The information in this section was collected on 7/25/17 (except where otherwise noted). HISTORY:   History of Present Injury/Illness (Reason for Referral): Jodi Smith presents with L knee pain after he slipped and fell at work, landing on this knee. This was approximately 2 weeks ago. Happened quickly and does not remember specifically how he landed. Work took him straight to [place], was given a knee brace. Upon follow-up, MRI was ordered and referred to PT. MRI report shows \"attenuation to the body and posterior horn of the medial meniscus\", \"flattening and degeneration\" throughout lateral meniscus, and also \"chronic appearing avulsion of the anterior tibial spine which is attached to the ACL which is degenerated and scarred. \"    Pain location: L knee, medial and posterior aspect, described as deep  Pain levels - Current: 3/10   At worst: 7/10  Description: Sharp   Increases pain: Walking long periods, stairs, getting in/out of car   Decreases pain: Ice occasionally, resting with knee propped up on pillow    Past Medical History/Comorbidities:   Mr. Lisa Gresham  has a past medical history of Arrhythmia; Diabetes ([address] Morbid obesity ([address] Nausea & vomiting; and Renal stone.  He also has no past medical history of Aneurysm ([facility] [address] Arthritis; Asthma; Autoimmune disease ([facility] [address] CAD (coronary artery disease); Chronic kidney disease; Chronic obstructive pulmonary disease ([facility] [address] Chronic pain; Coagulation disorder ([facility] [address] Difficult intubation; GERD (gastroesophageal reflux disease); Heart failure ([facility] [address] Hypertension; Liver disease; Malignant hyperthermia due to anesthesia; Pseudocholinesterase deficiency; Psychiatric disorder; PUD (peptic ulcer disease); Seizures ([facility] [address] Stroke [facility]); Thromboembolus ([facility] Thyroid disease; Unspecified adverse effect of anesthesia; or Unspecified sleep apnea. Mr. Jude Dobbins  has a past surgical history that includes appendectomy (1996); tonsillectomy (age 10); adenoidectomy (age15); orthopaedic; and orthopaedic (2015).     Social History/Living Environment:  Lives with family, 2 kids ages 1 and 2 months and has a dog    Prior Level of Function/Work/Activity: Works as a , does not exercise, enjoys spending time with family    Functional Limitations: Looking to go back to work with restrictions    Current Medications:       Current Outpatient Prescriptions:     metFORMIN ER 1,000 mg tr24, Take  by mouth two (2) times a day., Disp: , Rfl:    Date Last Reviewed:  8/23/2017     EXAMINATION:     Observation/Postural Assessment: Genu recurvatum on the L, visible atrophy of L quads and calf muscles    Palpation: No palpation to L knee grossly    ROM:   Knee AROM R L   Flexion 135 114   Extension 0 (8)     8/14/17 L knee flexion: 120    Strength:   LE MMT R L   Hip Flexion 5 5   Hip ER 5 5   Hip IR 5 5   Hip Abduction 4+ 4   Hip Extension 4+ 4   Knee Flexion 5 4   Knee Extension 5 5   Ankle DF 5 5   Ankle PF 3 2+      Heel raises: R: performs 7-8 before losing form  L: Cannot perform full range single leg heel raise  8/14/17: Calf strength remains the same    Functional Measures:    Gait: Mostly normal, makes effort not to come down hard on L foot    Special Tests:    Valgus: R: (-)  L: (-) for laxity, painful   Varus:  R: (-)  L: (-) for laxity, painful   Lachman's: R: (-)  L: very mild, almost insignificant laxity   Posterior drawer: (-) B      Balance:  SLS:  R: 30s  L: 9s     CLINICAL DECISION MAKING:   Outcome Measure: Tool Used: Lower Extremity Functional Scale (LEFS)  Score:  Initial: 56/80 Most Recent: X/80 (Date: -- )   Interpretation of Score: 20 questions each scored on a 5 point scale with 0 representing \"extreme difficulty or unable to perform\" and 4 representing \"no difficulty\". The lower the score, the greater the functional disability. 80/80 represents no disability. Minimal detectable change is 9 points. Medical Necessity:   · Patient demonstrates good rehab potential due to pain, difficulty walking, difficulty with stairs, decreased range of motion and strength, and higher previous functional level. Reason for Services/Other Comments:  · Patient continues to require skilled intervention due to decreased functional status. TREATMENT:   (In addition to Assessment/Re-Assessment sessions the following treatments were rendered)      Present Symptoms/Complaints - 8/23/2017: Pt reports he is really tired from working overnight and not getting much sleep the past 2 days. He says he was a little surprised but his knee is feeling a lot better with almost no pain since he went back to work. Pain: Initial:   Pain Intensity 1: 0  Post Session:  unchanged       Therapeutic Exercise: (30 minutes):  Exercises per grid below to improve mobility, strength and balance. Required moderate visual, verbal and tactile cues to promote proper body alignment, promote proper body posture and promote proper body mechanics. Progressed resistance, range and repetitions as indicated.      Date:  8/7/17 Date:  8/8/17 Date:  8/10/17 Date:  8/14/17 Date:  8/17/17 Date:  8/22/17   Activity/Exercise         Pt education         Recumbent bike 10 min L3 10 min L3 10 min L3 5 min before tape and 5 min after 10 min 8 min   Hamstring stretch Long holds B Long holds B Long holds B      Calf stretch Incline board long holds B Incline board long holds B Incline board long holds B      SLR 3 x 12 L 1# 3 x 10 1.5# 3 x 10 B 2# 3 x 10 2# 3 x 10    LAQ         Heel raises 30x on incline board  30x on incline board 2 x 20 on step     Hamstring curls 6# 35x 6# 30x 7.5# 3 x 10 7.5# 30x 10# 30x    Static balance exercises      SLS on airex, to fatigue   Square fitter DLS: s/s and fwd/back 30x ea        Sidelying hip abduction  2 x 10 B  1# 3 x 10 B 2# 2 x 10 B    Sidelying hip adduction  2 x 10 B  1# 3 x 10 L 2# 2 x 10 L    Standing TKE         Step-ups 6\" 20x L        Tandem walk 50'x2        Clamshell  2 x 10 B       Prone hip extension  2 x 10 B       Shuttle press   125# 3 x 10  150# 3 x 10 150# 2 x 20   Kumar tape    Lateral to medial L patella Lateral to medial L patella    Step-downs      4\" step 20x     Modalities (15 min):   Vasopneumatic cold compression to L knee, 34 degrees, high, for 15 minutes    Treatment/Session Assessment:  Pt had some discomfort with step-downs today but overall showed improvement vs previous sessions with functional and balance exercises. · Compliance with Program/Exercises: Good  · Recommendations/Intent for next treatment session: Next visit will focus on advancements to more challenging activities.   · Variance from plan of care: None    Total Treatment Duration:  PT Patient Time In/Time Out  Time In: 0745  Time Out: [address] [facility] [address], PT

## 2017-08-24 ENCOUNTER — HOSPITAL ENCOUNTER (OUTPATIENT)
Dept: PHYSICAL THERAPY | Age: 32
Discharge: HOME | End: 2017-08-24
Payer: COMMERCIAL

## 2017-08-24 PROCEDURE — 97016 VASOPNEUMATIC DEVICE THERAPY: CPT

## 2017-08-24 PROCEDURE — 97110 THERAPEUTIC EXERCISES: CPT

## 2017-08-24 NOTE — PROGRESS NOTES
Jamshid Sean  : 1985 [facility]  [address], [address], [place] [address]  [phone]   [phone]       OUTPATIENT PHYSICAL THERAPY:Daily Note 2017    ICD-10: Treatment Diagnosis:  Pain in left knee (M25.562), Sprain of unspecified site of left knee, subsequent encounter (S83.92XD), Difficulty in walking, not elsewhere classified (R26.2)  Precautions/Allergies:   Phenergan [promethazine]   Fall Risk Score: 1 (? 5 = High Risk)  MD Orders: PT Eval and treat MEDICAL/REFERRING DIAGNOSIS:  pain in left knee   DATE OF ONSET: early 2017  REFERRING PHYSICIAN: Jerry Doll MD  RETURN PHYSICIAN APPOINTMENT: 8/15/17     ASSESSMENT:  Mr. Shade Mi has participated in 8 visits of skilled PT. He demonstrates increased L knee flexion AROM and is steadily increasing resistance with therapeutic exercises. Pt has been having pain with activity and had some pain and apparent decreased stability of the L patella which was improved with taping. Pt has little difficulty walking and standing long periods, but it can cause increased pain. Pt was instructed to cont to wear his brace. Pt will benefit from cont skilled PT to reach his goals. PROBLEM LIST (Impacting functional limitations):  1. Decreased Strength  2. Decreased ADL/Functional Activities  3. Decreased Ambulation Ability/Technique  4. Decreased Balance  5. Increased Pain  6. Decreased Activity Tolerance  7. Decreased Flexibility/Joint Mobility   8. Edema/girth INTERVENTIONS PLANNED:  1. Balance Exercise  2. Cold  3. Cryotherapy  4. Electrical Stimulation  5. Heat  6. Home Exercise Program (HEP)  7. Manual Therapy  8. Neuromuscular Re-education/Strengthening  9. Range of Motion (ROM)  10. Therapeutic Exercise/Strengthening   TREATMENT PLAN:  Effective Dates: 17 TO 17.   Frequency/Duration: 3 times a week for 6 weeks  GOALS: (Goals have been discussed and agreed upon with patient.)  Short-Term Functional Goals: Time Frame: 3 weeks  1. Pt will be I and compliant with initial HEP  2. Pt will report 50% decrease in overall symptoms  Discharge Goals: Time Frame: 6 weeks  1. Pt will demo [place] [place] L knee flexion for improved function and ability to bend/squat  2. Pt will demo WFL B LE strength for normal function  3. Pt will report no difficulty navigating stairs  4. Pt will be able to walk any distance as needed with no restriction or modification  5. Pt will score at least 70/80 on LEFS  Rehabilitation Potential For Stated Goals: Good  Regarding Ravinder Heredia's therapy, I certify that the treatment plan above will be carried out by a therapist or under their direction. Thank you for this referral,  Gilford Cassette, PT               The information in this section was collected on 7/25/17 (except where otherwise noted). HISTORY:   History of Present Injury/Illness (Reason for Referral): Emi Chase presents with L knee pain after he slipped and fell at work, landing on this knee. This was approximately 2 weeks ago. Happened quickly and does not remember specifically how he landed. Work took him straight to [employer], was given a knee brace. Upon follow-up, MRI was ordered and referred to PT. MRI report shows \"attenuation to the body and posterior horn of the medial meniscus\", \"flattening and degeneration\" throughout lateral meniscus, and also \"chronic appearing avulsion of the anterior tibial spine which is attached to the ACL which is degenerated and scarred. \"    Pain location: L knee, medial and posterior aspect, described as deep  Pain levels - Current: 3/10   At worst: 7/10  Description: Sharp   Increases pain: Walking long periods, stairs, getting in/out of car   Decreases pain: Ice occasionally, resting with knee propped up on pillow    Past Medical History/Comorbidities:   Mr. Shellie De León  has a past medical history of Arrhythmia; Diabetes ([address] Morbid obesity ([address]  Nausea & vomiting; and Renal stone. He also has no past medical history of Aneurysm ([facility] [address] [facility] Arthritis; Asthma; Autoimmune disease ([facility] [facility] CAD (coronary artery disease); Chronic kidney disease; Chronic obstructive pulmonary disease ([facility] Chronic pain; Coagulation disorder ([facility] Difficult intubation; GERD (gastroesophageal reflux disease); Heart failure ([facility] [facility] Hypertension; Liver disease; Malignant hyperthermia due to anesthesia; Pseudocholinesterase deficiency; Psychiatric disorder; PUD (peptic ulcer disease); Seizures ([facility] [address] Stroke [facility]); Thromboembolus ([facility] [address] Thyroid disease; Unspecified adverse effect of anesthesia; or Unspecified sleep apnea. Mr. Eleonora Barroso  has a past surgical history that includes appendectomy (1996); tonsillectomy (age 10); adenoidectomy (age13); orthopaedic; and orthopaedic (2015).     Social History/Living Environment:  Lives with family, 2 kids ages 1 and 2 months and has a dog    Prior Level of Function/Work/Activity: Works as a , does not exercise, enjoys spending time with family    Functional Limitations: Looking to go back to work with restrictions    Current Medications:       Current Outpatient Prescriptions:     metFORMIN ER 1,000 mg tr24, Take  by mouth two (2) times a day., Disp: , Rfl:    Date Last Reviewed:  8/24/2017     EXAMINATION:     Observation/Postural Assessment: Genu recurvatum on the L, visible atrophy of L quads and calf muscles    Edema:  Mild of L knee 8/24/17    Palpation: No palpation to L knee grossly    ROM:   Knee AROM R L   Flexion 135 114   Extension 0 (8)     8/14/17 L knee flexion: 120    Strength:   LE MMT R L   Hip Flexion 5 5   Hip ER 5 5   Hip IR 5 5   Hip Abduction 4+ 4   Hip Extension 4+ 4   Knee Flexion 5 4   Knee Extension 5 5   Ankle DF 5 5   Ankle PF 3 2+      Heel raises: R: performs 7-8 before losing form  L: Cannot perform full range single leg heel raise  8/14/17: Calf strength remains the same    Functional Measures:    Gait: Mostly normal, makes effort not to come down hard on L foot    Special Tests:    Valgus: R: (-)  L: (-) for laxity, painful   Varus:  R: (-)  L: (-) for laxity, painful   Lachman's: R: (-)  L: very mild, almost insignificant laxity   Posterior drawer: (-) B      Balance:  SLS:  R: 30s  L: 9s     CLINICAL DECISION MAKING:   Outcome Measure: Tool Used: Lower Extremity Functional Scale (LEFS)  Score:  Initial: 56/80 Most Recent: X/80 (Date: -- )   Interpretation of Score: 20 questions each scored on a 5 point scale with 0 representing \"extreme difficulty or unable to perform\" and 4 representing \"no difficulty\". The lower the score, the greater the functional disability. 80/80 represents no disability. Minimal detectable change is 9 points. Medical Necessity:   · Patient demonstrates good rehab potential due to pain, difficulty walking, difficulty with stairs, decreased range of motion and strength, and higher previous functional level. Reason for Services/Other Comments:  · Patient continues to require skilled intervention due to decreased functional status. TREATMENT:   (In addition to Assessment/Re-Assessment sessions the following treatments were rendered)      Present Symptoms/Complaints - 8/24/2017: Pt reports he has not gotten much sleep this week and is very tired. His knee overall is feeling better. Pain: Initial:   Pain Intensity 1: 1  Post Session:  unchanged       Therapeutic Exercise: (30 minutes):  Exercises per grid below to improve mobility, strength and balance. Required moderate visual, verbal and tactile cues to promote proper body alignment, promote proper body posture and promote proper body mechanics. Progressed resistance, range and repetitions as indicated.      Date:  8/8/17 Date:  8/10/17 Date:  8/14/17 Date:  8/17/17 Date:  8/22/17 Date:  8/24/17   Activity/Exercise         Pt education         Recumbent bike 10 min L3 10 min L3 5 min before tape and 5 min after 10 min 8 min 10 min Hamstring stretch Long holds B Long holds B       Calf stretch Incline board long holds B Incline board long holds B    Long holds B   SLR 1# 3 x 10 1.5# 3 x 10 B 2# 3 x 10 2# 3 x 10     LAQ         Heel raises  30x on incline board 2 x 20 on step      Hamstring curls 6# 30x 7.5# 3 x 10 7.5# 30x 10# 30x     Static balance exercises     SLS on airex, to fatigue    Square fitter         Sidelying hip abduction 2 x 10 B  1# 3 x 10 B 2# 2 x 10 B     Sidelying hip adduction 2 x 10 B  1# 3 x 10 L 2# 2 x 10 L     Standing TKE         Step-ups         Tandem walk         Clamshell 2 x 10 B        Prone hip extension 2 x 10 B     Bent over 2# 20x R, 10x L then 10x L with no weight   Shuttle press  125# 3 x 10  150# 3 x 10 150# 2 x 20 162.6# 25x   Kumar tape   Lateral to medial L patella Lateral to medial L patella  Lateral to medial L patella   Step-downs     4\" step 20x 4\" 20x   Bridge      5s holds x10 focus on glutes     Modalities (15 min):   Vasopneumatic cold compression to L knee, 34 degrees, high, for 15 minutes    Treatment/Session Assessment:  Pt had some discomfort with step-downs today like last session and due to fatigue did decreased repetitions with shuttle press. Recommended bringing knee brace to work in case he needed it, and used taping today to increase patellar stability. · Compliance with Program/Exercises: Good  · Recommendations/Intent for next treatment session: Next visit will focus on advancements to more challenging activities.   · Variance from plan of care: None    Total Treatment Duration:  PT Patient Time In/Time Out  Time In: 1630  Time Out: [facility], PT

## 2017-08-29 ENCOUNTER — HOSPITAL ENCOUNTER (OUTPATIENT)
Dept: PHYSICAL THERAPY | Age: 32
Discharge: HOME | End: 2017-08-29
Payer: COMMERCIAL

## 2017-08-29 NOTE — PROGRESS NOTES
Robert Franks  : 1985 [facility]  [address], [address], [place] [address]  [phone]   [phone]        OUTPATIENT DAILY NOTE    NAME/AGE/GENDER: Robert Franks is a 33 y.o. male.      DATE: 2017    Appt canceled today per therapist.  Supriya Nair will be seen for discharge appt on Thursday, 17 at 4:30pm.    Ashtyn Lloyd, PT

## 2017-08-31 ENCOUNTER — HOSPITAL ENCOUNTER (OUTPATIENT)
Dept: PHYSICAL THERAPY | Age: 32
Discharge: HOME | End: 2017-08-31
Payer: COMMERCIAL

## 2017-08-31 PROCEDURE — 97110 THERAPEUTIC EXERCISES: CPT

## 2017-08-31 PROCEDURE — 97016 VASOPNEUMATIC DEVICE THERAPY: CPT

## 2017-08-31 NOTE — PROGRESS NOTES
Paras Gabriel  : [facility]  [address], [address], [place]  [phone]   [phone]       OUTPATIENT PHYSICAL THERAPY:Daily Note and Discharge 2017    ICD-10: Treatment Diagnosis:  Pain in left knee (M25.562), Sprain of unspecified site of left knee, subsequent encounter (S83.92XD), Difficulty in walking, not elsewhere classified (R26.2)  Precautions/Allergies:   Phenergan [promethazine]   Fall Risk Score: 1 (? 5 = High Risk)  MD Orders: PT Eval and treat MEDICAL/REFERRING DIAGNOSIS:  pain in left knee   DATE OF ONSET: early 2017  REFERRING PHYSICIAN: Stefany Blevins MD  RETURN PHYSICIAN APPOINTMENT: 8/15/17     ASSESSMENT:  Mr. Laurie Arellano has participated in 12 visits of skilled PT and has reached most of his goals. He has improved range of motion and strength, and has been steadily increasing the difficulty of his exercises. He has gone back to work successfully, still has some pain but is able to function well. He understands his HEP well and is discharged from PT. PROBLEM LIST (Impacting functional limitations):  1. Decreased Strength  2. Decreased ADL/Functional Activities  3. Decreased Ambulation Ability/Technique  4. Decreased Balance  5. Increased Pain  6. Decreased Activity Tolerance  7. Decreased Flexibility/Joint Mobility   8. Edema/girth INTERVENTIONS PLANNED:  1. Balance Exercise  2. Cold  3. Cryotherapy  4. Electrical Stimulation  5. Heat  6. Home Exercise Program (HEP)  7. Manual Therapy  8. Neuromuscular Re-education/Strengthening  9. Range of Motion (ROM)  10. Therapeutic Exercise/Strengthening   TREATMENT PLAN:  Effective Dates: 17 TO 17. Frequency/Duration: 3 times a week for 6 weeks  GOALS: (Goals have been discussed and agreed upon with patient.)  Short-Term Functional Goals: Time Frame: 3 weeks  1. Pt will be I and compliant with initial HEP - met  2.  Pt will report 50% decrease in overall symptoms - 50% met  Discharge Goals: Time Frame: 6 weeks  1. Pt will demo [facility] L knee flexion for improved function and ability to bend/squat - met  2. Pt will demo WFL B LE strength for normal function - met  3. Pt will report no difficulty navigating stairs - met but painful  4. Pt will be able to walk any distance as needed with no restriction or modification - met but pt is still dealing with pain  5. Pt will score at least 70/80 on LEFS - 50% met    Thank you for this referral,  Faiza Saldana PT               The information in this section was collected on 7/25/17 (except where otherwise noted). HISTORY:   History of Present Injury/Illness (Reason for Referral): Coy Weeks presents with L knee pain after he slipped and fell at work, landing on this knee. This was approximately 2 weeks ago. Happened quickly and does not remember specifically how he landed. Work took him straight to [place], was given a knee brace. Upon follow-up, MRI was ordered and referred to PT. MRI report shows \"attenuation to the body and posterior horn of the medial meniscus\", \"flattening and degeneration\" throughout lateral meniscus, and also \"chronic appearing avulsion of the anterior tibial spine which is attached to the ACL which is degenerated and scarred. \"    Pain location: L knee, medial and posterior aspect, described as deep  Pain levels - Current: 3/10   At worst: 7/10  Description: Sharp   Increases pain: Walking long periods, stairs, getting in/out of car   Decreases pain: Ice occasionally, resting with knee propped up on pillow    Past Medical History/Comorbidities:   Mr. Lawson Escobar  has a past medical history of Arrhythmia; Diabetes ([address] Morbid obesity ([address] Nausea & vomiting; and Renal stone. He also has no past medical history of Aneurysm ([address] Arthritis; Asthma; Autoimmune disease ([address] CAD (coronary artery disease); Chronic kidney disease; Chronic obstructive pulmonary disease ([address]  Chronic pain; Coagulation disorder ([facility] Difficult intubation; GERD (gastroesophageal reflux disease); Heart failure ([facility] Hypertension; Liver disease; Malignant hyperthermia due to anesthesia; Pseudocholinesterase deficiency; Psychiatric disorder; PUD (peptic ulcer disease); Seizures ([facility] Stroke [facility]); Thromboembolus ([facility] Thyroid disease; Unspecified adverse effect of anesthesia; or Unspecified sleep apnea. Mr. Tyson Min  has a past surgical history that includes appendectomy (1996); tonsillectomy (age 10); adenoidectomy (age13); orthopaedic; and orthopaedic (2015).     Social History/Living Environment:  Lives with family, 2 kids ages 1 and 2 months and has a dog    Prior Level of Function/Work/Activity: Works as a , does not exercise, enjoys spending time with family    Functional Limitations: Looking to go back to work with restrictions    Current Medications:       Current Outpatient Prescriptions:     metFORMIN ER 1,000 mg tr24, Take  by mouth two (2) times a day., Disp: , Rfl:    Date Last Reviewed:  8/31/2017     EXAMINATION:     Observation/Postural Assessment: Genu recurvatum on the L, visible atrophy of L quads and calf muscles    Edema:  Mild of L knee 8/31/17    Palpation: No palpation to L knee grossly    ROM:   Knee AROM R L   Flexion 135 114   Extension 0 (8)     8/14/17 L knee flexion: 120  8/31/17: 125    Strength:   LE MMT R L R L   Hip Flexion 5 5 - -   Hip ER 5 5 - --   Hip IR 5 5 - -   Hip Abduction 4+ 4 4+ 4+   Hip Extension 4+ 4 4+ 4+   Knee Flexion 5 4 - 5   Knee Extension 5 5 - -   Ankle DF 5 5 - -   Ankle PF 3 2+ 3 2+      Heel raises: R: performs 7-8 before losing form  L: Cannot perform full range single leg heel raise  8/14/17: Calf strength remains the same    Functional Measures:    Gait: Mostly normal, makes effort not to come down hard on L foot    Special Tests:    Valgus: R: (-)  L: (-) for laxity, painful   Varus:  R: (-)  L: (-) for laxity, painful   Lachman's: R: (-)  L: very mild, almost insignificant laxity   Posterior drawer: (-) B      Balance:  SLS:  R: 30s  L: 9s     CLINICAL DECISION MAKING:   Outcome Measure: Tool Used: Lower Extremity Functional Scale (LEFS)  Score:  Initial: 56/80 Most Recent: 62/80 (Date: 8/31/17 )   Interpretation of Score: 20 questions each scored on a 5 point scale with 0 representing \"extreme difficulty or unable to perform\" and 4 representing \"no difficulty\". The lower the score, the greater the functional disability. 80/80 represents no disability. Minimal detectable change is 9 points. TREATMENT:   (In addition to Assessment/Re-Assessment sessions the following treatments were rendered)      Present Symptoms/Complaints - 8/31/2017: Pt reports work is going fine and he is ready for discharge. Pain: Initial:   Pain Intensity 1: 2  Post Session:  Unchanged, pain with squat       Therapeutic Exercise: (30 minutes):  Exercises per grid below to improve mobility, strength and balance. Required moderate visual, verbal and tactile cues to promote proper body alignment, promote proper body posture and promote proper body mechanics. Progressed resistance, range and repetitions as indicated.      Date:  8/10/17 Date:  8/14/17 Date:  8/17/17 Date:  8/22/17 Date:  8/24/17 Date:  8/31/17   Activity/Exercise         Pt education         Recumbent bike 10 min L3 5 min before tape and 5 min after 10 min 8 min 10 min 10 min   Hamstring stretch Long holds B        Calf stretch Incline board long holds B    Long holds B HEP   SLR 1.5# 3 x 10 B 2# 3 x 10 2# 3 x 10   3# 20x   Heel raises 30x on incline board 2 x 20 on step    Incline board 30x   Hamstring curls 7.5# 3 x 10 7.5# 30x 10# 30x      Static balance exercises    SLS on airex, to fatigue     Sidelying hip abduction  1# 3 x 10 B 2# 2 x 10 B      Sidelying hip adduction  1# 3 x 10 L 2# 2 x 10 L      Prone hip extension     Bent over 2# 20x R, 10x L then 10x L with no weight    Shuttle press 125# 3 x 10  150# 3 x 10 150# 2 x 20 162.6# 25x    Kumar tape  Lateral to medial L patella Lateral to medial L patella  Lateral to medial L patella    Step-downs    4\" step 20x 4\" 20x    Bridge     5s holds x10 focus on glutes 5s holds x20 focus on glutes   Squat      5x   Sit<>stand      15x     Modalities (15 min):   Vasopneumatic cold compression to L knee, 34 degrees, high, for 15 minutes    Treatment/Session Assessment:  Pt understands HEP well. Had a lot of pain with squats, changed to sit<>stand and explained progression to patient.     Total Treatment Duration:  PT Patient Time In/Time Out  Time In: 1630  Time Out: 1715    Liz Jiménez, PT

## 2019-07-21 ENCOUNTER — APPOINTMENT (OUTPATIENT)
Dept: CT IMAGING | Age: 34
End: 2019-07-21
Attending: EMERGENCY MEDICINE
Payer: SELF-PAY

## 2019-07-21 ENCOUNTER — HOSPITAL ENCOUNTER (EMERGENCY)
Age: 34
Discharge: HOME | End: 2019-07-21
Attending: EMERGENCY MEDICINE
Payer: SELF-PAY

## 2019-07-21 DIAGNOSIS — R10.9 FLANK PAIN: Primary | ICD-10-CM

## 2019-07-21 LAB
ALBUMIN SERPL-MCNC: 3.3 G/DL (ref 3.5–5)
ALBUMIN/GLOB SERPL: 0.8 {RATIO} (ref 1.2–3.5)
ALP SERPL-CCNC: 143 U/L (ref 50–136)
ALT SERPL-CCNC: 33 U/L (ref 12–65)
ANION GAP SERPL CALC-SCNC: 8 MMOL/L (ref 7–16)
AST SERPL-CCNC: 21 U/L (ref 15–37)
BASOPHILS # BLD: 0 K/UL (ref 0–0.2)
BASOPHILS NFR BLD: 1 % (ref 0–2)
BILIRUB SERPL-MCNC: 0.3 MG/DL (ref 0.2–1.1)
BUN SERPL-MCNC: 11 MG/DL (ref 6–23)
CALCIUM SERPL-MCNC: 9.3 MG/DL (ref 8.3–10.4)
CHLORIDE SERPL-SCNC: 100 MMOL/L (ref 98–107)
CO2 SERPL-SCNC: 30 MMOL/L (ref 21–32)
CREAT SERPL-MCNC: 0.9 MG/DL (ref 0.8–1.5)
DIFFERENTIAL METHOD BLD: ABNORMAL
EOSINOPHIL # BLD: 0.2 K/UL (ref 0–0.8)
EOSINOPHIL NFR BLD: 3 % (ref 0.5–7.8)
ERYTHROCYTE [DISTWIDTH] IN BLOOD BY AUTOMATED COUNT: 14.1 % (ref 11.9–14.6)
GLOBULIN SER CALC-MCNC: 4.1 G/DL (ref 2.3–3.5)
GLUCOSE SERPL-MCNC: 281 MG/DL (ref 65–100)
HCT VFR BLD AUTO: 44.5 % (ref 41.1–50.3)
HGB BLD-MCNC: 14.7 G/DL (ref 13.6–17.2)
IMM GRANULOCYTES # BLD AUTO: 0 K/UL (ref 0–0.5)
IMM GRANULOCYTES NFR BLD AUTO: 1 % (ref 0–5)
LYMPHOCYTES # BLD: 2.2 K/UL (ref 0.5–4.6)
LYMPHOCYTES NFR BLD: 30 % (ref 13–44)
MCH RBC QN AUTO: 25.2 PG (ref 26.1–32.9)
MCHC RBC AUTO-ENTMCNC: 33 G/DL (ref 31.4–35)
MCV RBC AUTO: 76.2 FL (ref 79.6–97.8)
MONOCYTES # BLD: 0.5 K/UL (ref 0.1–1.3)
MONOCYTES NFR BLD: 7 % (ref 4–12)
NEUTS SEG # BLD: 4.4 K/UL (ref 1.7–8.2)
NEUTS SEG NFR BLD: 60 % (ref 43–78)
NRBC # BLD: 0 K/UL (ref 0–0.2)
PLATELET # BLD AUTO: 274 K/UL (ref 150–450)
PMV BLD AUTO: 9.6 FL (ref 9.4–12.3)
POTASSIUM SERPL-SCNC: 4.3 MMOL/L (ref 3.5–5.1)
PROT SERPL-MCNC: 7.4 G/DL (ref 6.3–8.2)
RBC # BLD AUTO: 5.84 M/UL (ref 4.23–5.6)
SODIUM SERPL-SCNC: 138 MMOL/L (ref 136–145)
WBC # BLD AUTO: 7.4 K/UL (ref 4.3–11.1)

## 2019-07-21 PROCEDURE — 85025 COMPLETE CBC W/AUTO DIFF WBC: CPT

## 2019-07-21 PROCEDURE — 74176 CT ABD & PELVIS W/O CONTRAST: CPT

## 2019-07-21 PROCEDURE — 99282 EMERGENCY DEPT VISIT SF MDM: CPT | Performed by: EMERGENCY MEDICINE

## 2019-07-21 PROCEDURE — 96360 HYDRATION IV INFUSION INIT: CPT | Performed by: EMERGENCY MEDICINE

## 2019-07-21 PROCEDURE — 80053 COMPREHEN METABOLIC PANEL: CPT

## 2019-07-21 PROCEDURE — 74011250636 HC RX REV CODE- 250/636: Performed by: EMERGENCY MEDICINE

## 2019-07-21 RX ADMIN — SODIUM CHLORIDE 1000 ML: 900 INJECTION, SOLUTION INTRAVENOUS at 22:16

## 2019-07-21 NOTE — LETTER
[address] [address] EMERGENCY DEPT 
[address] [facility] [address] 
[phone] Work/School Note Date: 7/21/2019 To Whom It May concern: 
 
Jahaira Bean was seen and treated today in the emergency room by the following provider(s): 
Attending Provider: Bronwyn Mckinley MD. Jahaira Bean may return to work on 7/21/2019.  
 
Sincerely, 
 
 
 
 
Imtiaz Howell MD

## 2019-07-22 NOTE — ED PROVIDER NOTES
26-year-old white male with history of insulin-dependent diabetes and kidney stones, developed left-sided flank pain when 2:00 today. Initial episode of pain was mild and brief. He was driving to work around 9 PM when the pain became severe. He has had nausea but no vomiting. No dysuria or hematuria. No injury. Pain feels similar to previous kidney stones. The history is provided by the patient. Kidney Stone    Pertinent negatives include no chest pain, no fever, no headaches, no abdominal pain and no dysuria.         Past Medical History:   Diagnosis Date    Arrhythmia     \" artery in heart goes into spams\"  no meds    Diabetes ([facility]     type ll age 25, avgfbs 320, last A1C was 15 ,     Morbid obesity (HCC)     bmi 17    Nausea & vomiting     Renal stone        Past Surgical History:   Procedure Laterality Date    HX ADENOIDECTOMY  age13    HX APPENDECTOMY  1996    HX ORTHOPAEDIC      left knee x 3  scopes    HX ORTHOPAEDIC  2015    right shoulder repair    HX TONSILLECTOMY  age 10         Family History:   Problem Relation Age of Onset    Kidney Disease Mother     Diabetes Father     Kidney Disease Father        Social History     Socioeconomic History    Marital status:      Spouse name: Not on file    Number of children: Not on file    Years of education: Not on file    Highest education level: Not on file   Occupational History    Not on file   Social Needs    Financial resource strain: Not on file    Food insecurity:     Worry: Not on file     Inability: Not on file    Transportation needs:     Medical: Not on file     Non-medical: Not on file   Tobacco Use    Smoking status: Never Smoker    Smokeless tobacco: Never Used   Substance and Sexual Activity    Alcohol use: Yes     Comment: occasional    Drug use: No    Sexual activity: Not on file   Lifestyle    Physical activity:     Days per week: Not on file     Minutes per session: Not on file    Stress: Not on file Relationships    Social connections:     Talks on phone: Not on file     Gets together: Not on file     Attends Yazdanism service: Not on file     Active member of club or organization: Not on file     Attends meetings of clubs or organizations: Not on file     Relationship status: Not on file    Intimate partner violence:     Fear of current or ex partner: Not on file     Emotionally abused: Not on file     Physically abused: Not on file     Forced sexual activity: Not on file   Other Topics Concern    Not on file   Social History Narrative    Not on file         ALLERGIES: Phenergan [promethazine]    Review of Systems   Constitutional: Negative for fever. Respiratory: Negative for cough and shortness of breath. Cardiovascular: Negative for chest pain. Gastrointestinal: Positive for nausea. Negative for abdominal pain and vomiting. Genitourinary: Positive for flank pain. Negative for dysuria and hematuria. Musculoskeletal: Negative for back pain and neck pain. Skin: Negative for rash. Neurological: Negative for headaches. There were no vitals filed for this visit. Physical Exam   Constitutional: He is oriented to person, place, and time. He appears well-developed and well-nourished. No distress. HENT:   Head: Normocephalic and atraumatic. Mouth/Throat: Oropharynx is clear and moist.   Eyes: Pupils are equal, round, and reactive to light. Conjunctivae are normal.   Neck: Normal range of motion. Neck supple. Cardiovascular: Normal rate and regular rhythm. Pulmonary/Chest: Effort normal and breath sounds normal.   Abdominal: Soft. He exhibits no distension. There is no tenderness. Musculoskeletal: Normal range of motion. Neurological: He is alert and oriented to person, place, and time. Skin: Skin is warm and dry. Psychiatric: He has a normal mood and affect. His behavior is normal.   Nursing note and vitals reviewed.        MDM  Number of Diagnoses or Management Options  Flank pain:   Diagnosis management comments: Urine dip shows blood but no infection. Labwork unremarkable. CT scan shows no ureteral stone. Patient has not required any pain medication. Etiology for his pain, unclear, but at this time does not appear to be an acute infectious or surgical emergency.        Amount and/or Complexity of Data Reviewed  Clinical lab tests: ordered and reviewed  Tests in the radiology section of CPT®: ordered and reviewed    Risk of Complications, Morbidity, and/or Mortality  Presenting problems: moderate  Diagnostic procedures: low  Management options: low           Procedures

## 2019-07-22 NOTE — ED TRIAGE NOTES
Pt is having left flank pain which started around 2pm today. Pt has hx of kidney stones and states this is his 4th stone.

## 2019-07-22 NOTE — DISCHARGE INSTRUCTIONS
Patient Education        Flank Pain: Care Instructions  Your Care Instructions  Flank pain is pain on the side of the back just below the rib cage and above the waist. It can be on one or both sides. Flank pain has many possible causes, including a kidney stone, a urinary tract infection, or back strain. Flank pain may get better on its own. But don't ignore new symptoms, such as fever, nausea and vomiting, urination problems, pain that gets worse, and dizziness. These may be signs of a more serious problem. You may have to have tests or other treatment. Follow-up care is a key part of your treatment and safety. Be sure to make and go to all appointments, and call your doctor if you are having problems. It's also a good idea to know your test results and keep a list of the medicines you take. How can you care for yourself at home? · Rest until you feel better. · Take pain medicines exactly as directed. ? If the doctor gave you a prescription medicine for pain, take it as prescribed. ? If you are not taking a prescription pain medicine, ask your doctor if you can take an over-the-counter pain medicine, such as acetaminophen (Tylenol), ibuprofen (Advil, Motrin), or naproxen (Aleve). Read and follow all instructions on the label. · If your doctor prescribed antibiotics, take them as directed. Do not stop taking them just because you feel better. You need to take the full course of antibiotics. · To apply heat, put a warm water bottle, a heating pad set on low, or a warm cloth on the painful area. Do not go to sleep with a heating pad on your skin. · To prevent dehydration, drink plenty of fluids, enough so that your urine is light yellow or clear like water. Choose water and other caffeine-free clear liquids until you feel better. If you have kidney, heart, or liver disease and have to limit fluids, talk with your doctor before you increase the amount of fluids you drink. When should you call for help?   Call your doctor now or seek immediate medical care if:    · Your flank pain gets worse.     · You have new symptoms, such as fever, nausea, or vomiting.     · You have symptoms of a urinary problem. For example:  ? You have blood or pus in your urine. ? You have chills or body aches. ? It hurts to urinate. ? You have groin or belly pain.    Watch closely for changes in your health, and be sure to contact your doctor if you do not get better as expected. Where can you learn more? Go to http://hiren-yogi.info/. Enter S191 in the search box to learn more about \"Flank Pain: Care Instructions. \"  Current as of: September 23, 2018  Content Version: 12.1  © 1868-5224 [employer]. Care instructions adapted under license by [employer] (which disclaims liability or warranty for this information). If you have questions about a medical condition or this instruction, always ask your healthcare professional. [place] [address] any warranty or liability for your use of this information.

## 2019-07-22 NOTE — ED NOTES
I have reviewed discharge instructions with the patient. The patient verbalized understanding. Patient left ED via Discharge Method: ambulatory to Home with self. Opportunity for questions and clarification provided. Patient given 0 scripts. To continue your aftercare when you leave the hospital, you may receive an automated call from our care team to check in on how you are doing. This is a free service and part of our promise to provide the best care and service to meet your aftercare needs.  If you have questions, or wish to unsubscribe from this service please call [phone]. Thank you for Choosing our [address] [place] [address] Emergency Department.

## 2020-01-31 PROBLEM — N52.1 ERECTILE DYSFUNCTION DUE TO DISEASES CLASSIFIED ELSEWHERE: Status: ACTIVE | Noted: 2019-08-06

## 2020-01-31 PROBLEM — I10 ESSENTIAL HYPERTENSION: Status: ACTIVE | Noted: 2018-09-02

## 2020-01-31 PROBLEM — F41.8 DEPRESSION WITH ANXIETY: Status: ACTIVE | Noted: 2017-04-11

## 2020-01-31 PROBLEM — E66.01 SEVERE OBESITY (HCC): Status: ACTIVE | Noted: 2020-01-31

## 2020-01-31 PROBLEM — E78.00 HYPERCHOLESTEROLEMIA: Status: ACTIVE | Noted: 2017-01-25

## 2020-01-31 PROBLEM — E11.42 DIABETIC POLYNEUROPATHY ASSOCIATED WITH TYPE 2 DIABETES MELLITUS (HCC): Status: ACTIVE | Noted: 2019-08-21

## 2020-10-08 PROCEDURE — 96374 THER/PROPH/DIAG INJ IV PUSH: CPT

## 2020-10-08 PROCEDURE — 99285 EMERGENCY DEPT VISIT HI MDM: CPT

## 2020-10-08 PROCEDURE — 96375 TX/PRO/DX INJ NEW DRUG ADDON: CPT

## 2020-10-09 ENCOUNTER — APPOINTMENT (OUTPATIENT)
Dept: CT IMAGING | Age: 35
End: 2020-10-09
Attending: EMERGENCY MEDICINE
Payer: MEDICAID

## 2020-10-09 ENCOUNTER — HOSPITAL ENCOUNTER (EMERGENCY)
Age: 35
Discharge: PSYCHIATRIC OR SUBSTANCE USE FACILITY | End: 2020-10-09
Attending: EMERGENCY MEDICINE
Payer: MEDICAID

## 2020-10-09 ENCOUNTER — APPOINTMENT (OUTPATIENT)
Dept: MRI IMAGING | Age: 35
End: 2020-10-09
Attending: EMERGENCY MEDICINE
Payer: MEDICAID

## 2020-10-09 VITALS
HEIGHT: 72 IN | BODY MASS INDEX: 40.63 KG/M2 | TEMPERATURE: 98.3 F | HEART RATE: 82 BPM | RESPIRATION RATE: 16 BRPM | DIASTOLIC BLOOD PRESSURE: 75 MMHG | OXYGEN SATURATION: 95 % | SYSTOLIC BLOOD PRESSURE: 132 MMHG | WEIGHT: 300 LBS

## 2020-10-09 DIAGNOSIS — R29.810 FACIAL DROOP: Primary | ICD-10-CM

## 2020-10-09 LAB
ANION GAP SERPL CALC-SCNC: 6 MMOL/L (ref 7–16)
ATRIAL RATE: 75 BPM
BASOPHILS # BLD: 0.1 K/UL (ref 0–0.2)
BASOPHILS NFR BLD: 1 % (ref 0–2)
BUN SERPL-MCNC: 13 MG/DL (ref 6–23)
CALCIUM SERPL-MCNC: 8.9 MG/DL (ref 8.3–10.4)
CALCULATED P AXIS, ECG09: 58 DEGREES
CALCULATED R AXIS, ECG10: 58 DEGREES
CALCULATED T AXIS, ECG11: 22 DEGREES
CHLORIDE SERPL-SCNC: 101 MMOL/L (ref 98–107)
CO2 SERPL-SCNC: 27 MMOL/L (ref 21–32)
CREAT SERPL-MCNC: 1.19 MG/DL (ref 0.8–1.5)
DIAGNOSIS, 93000: NORMAL
DIFFERENTIAL METHOD BLD: ABNORMAL
EOSINOPHIL # BLD: 0.2 K/UL (ref 0–0.8)
EOSINOPHIL NFR BLD: 2 % (ref 0.5–7.8)
ERYTHROCYTE [DISTWIDTH] IN BLOOD BY AUTOMATED COUNT: 13.7 % (ref 11.9–14.6)
GLUCOSE BLD STRIP.AUTO-MCNC: 143 MG/DL (ref 65–100)
GLUCOSE SERPL-MCNC: 137 MG/DL (ref 65–100)
HCT VFR BLD AUTO: 40.5 % (ref 41.1–50.3)
HGB BLD-MCNC: 13.3 G/DL (ref 13.6–17.2)
IMM GRANULOCYTES # BLD AUTO: 0.1 K/UL (ref 0–0.5)
IMM GRANULOCYTES NFR BLD AUTO: 1 % (ref 0–5)
INR PPP: 0.9
LYMPHOCYTES # BLD: 1.6 K/UL (ref 0.5–4.6)
LYMPHOCYTES NFR BLD: 18 % (ref 13–44)
MCH RBC QN AUTO: 25.3 PG (ref 26.1–32.9)
MCHC RBC AUTO-ENTMCNC: 32.8 G/DL (ref 31.4–35)
MCV RBC AUTO: 77 FL (ref 79.6–97.8)
MONOCYTES # BLD: 0.8 K/UL (ref 0.1–1.3)
MONOCYTES NFR BLD: 8 % (ref 4–12)
NEUTS SEG # BLD: 6.5 K/UL (ref 1.7–8.2)
NEUTS SEG NFR BLD: 70 % (ref 43–78)
NRBC # BLD: 0 K/UL (ref 0–0.2)
P-R INTERVAL, ECG05: 166 MS
PLATELET # BLD AUTO: 263 K/UL (ref 150–450)
PMV BLD AUTO: 8.9 FL (ref 9.4–12.3)
POTASSIUM SERPL-SCNC: 4.2 MMOL/L (ref 3.5–5.1)
PROTHROMBIN TIME: 12.8 SEC (ref 12–14.7)
Q-T INTERVAL, ECG07: 356 MS
QRS DURATION, ECG06: 96 MS
QTC CALCULATION (BEZET), ECG08: 397 MS
RBC # BLD AUTO: 5.26 M/UL (ref 4.23–5.6)
SODIUM SERPL-SCNC: 134 MMOL/L (ref 136–145)
TROPONIN-HIGH SENSITIVITY: 3.4 PG/ML (ref 0–14)
VENTRICULAR RATE, ECG03: 75 BPM
WBC # BLD AUTO: 9.2 K/UL (ref 4.3–11.1)

## 2020-10-09 PROCEDURE — 82962 GLUCOSE BLOOD TEST: CPT

## 2020-10-09 PROCEDURE — 74011250636 HC RX REV CODE- 250/636: Performed by: EMERGENCY MEDICINE

## 2020-10-09 PROCEDURE — 85025 COMPLETE CBC W/AUTO DIFF WBC: CPT

## 2020-10-09 PROCEDURE — 74011000258 HC RX REV CODE- 258

## 2020-10-09 PROCEDURE — 70496 CT ANGIOGRAPHY HEAD: CPT

## 2020-10-09 PROCEDURE — 85610 PROTHROMBIN TIME: CPT

## 2020-10-09 PROCEDURE — 74011250637 HC RX REV CODE- 250/637: Performed by: EMERGENCY MEDICINE

## 2020-10-09 PROCEDURE — 84484 ASSAY OF TROPONIN QUANT: CPT

## 2020-10-09 PROCEDURE — 0042T CT PERF W CBF: CPT

## 2020-10-09 PROCEDURE — 80048 BASIC METABOLIC PNL TOTAL CA: CPT

## 2020-10-09 PROCEDURE — 70553 MRI BRAIN STEM W/O & W/DYE: CPT

## 2020-10-09 PROCEDURE — 93005 ELECTROCARDIOGRAM TRACING: CPT | Performed by: EMERGENCY MEDICINE

## 2020-10-09 PROCEDURE — A9575 INJ GADOTERATE MEGLUMI 0.1ML: HCPCS | Performed by: EMERGENCY MEDICINE

## 2020-10-09 PROCEDURE — 70450 CT HEAD/BRAIN W/O DYE: CPT

## 2020-10-09 PROCEDURE — 74011000636 HC RX REV CODE- 636

## 2020-10-09 RX ORDER — INSULIN GLARGINE 100 [IU]/ML
35 INJECTION, SOLUTION SUBCUTANEOUS
COMMUNITY
Start: 2020-08-24

## 2020-10-09 RX ORDER — OMEPRAZOLE 40 MG/1
40 CAPSULE, DELAYED RELEASE ORAL DAILY
COMMUNITY
Start: 2020-08-24 | End: 2021-08-24

## 2020-10-09 RX ORDER — SILDENAFIL 100 MG/1
100 TABLET, FILM COATED ORAL AS NEEDED
COMMUNITY
Start: 2020-07-16 | End: 2021-07-16

## 2020-10-09 RX ORDER — SODIUM CHLORIDE 0.9 % (FLUSH) 0.9 %
10 SYRINGE (ML) INJECTION
Status: DISCONTINUED | OUTPATIENT
Start: 2020-10-09 | End: 2020-10-09 | Stop reason: HOSPADM

## 2020-10-09 RX ORDER — GABAPENTIN 400 MG/1
400 CAPSULE ORAL 3 TIMES DAILY
COMMUNITY
Start: 2020-07-22 | End: 2021-07-22

## 2020-10-09 RX ORDER — BUSPIRONE HYDROCHLORIDE 10 MG/1
10 TABLET ORAL 3 TIMES DAILY
COMMUNITY

## 2020-10-09 RX ORDER — ASPIRIN 325 MG
325 TABLET ORAL
Status: COMPLETED | OUTPATIENT
Start: 2020-10-09 | End: 2020-10-09

## 2020-10-09 RX ORDER — BLOOD-GLUCOSE METER
EACH MISCELLANEOUS
COMMUNITY
Start: 2020-08-18

## 2020-10-09 RX ORDER — GLIPIZIDE 10 MG/1
10 TABLET, FILM COATED, EXTENDED RELEASE ORAL
COMMUNITY
Start: 2019-11-05 | End: 2020-11-04

## 2020-10-09 RX ORDER — HYDROXYZINE PAMOATE 25 MG/1
CAPSULE ORAL
Status: DISCONTINUED
Start: 2020-10-09 | End: 2020-10-09 | Stop reason: HOSPADM

## 2020-10-09 RX ORDER — INSULIN LISPRO 200 [IU]/ML
INJECTION, SOLUTION SUBCUTANEOUS
COMMUNITY
Start: 2020-08-25 | End: 2021-08-25

## 2020-10-09 RX ORDER — LORAZEPAM 1 MG/1
1 TABLET ORAL
Status: COMPLETED | OUTPATIENT
Start: 2020-10-09 | End: 2020-10-09

## 2020-10-09 RX ORDER — HYDROXYZINE PAMOATE 25 MG/1
25 CAPSULE ORAL
Status: COMPLETED | OUTPATIENT
Start: 2020-10-09 | End: 2020-10-09

## 2020-10-09 RX ORDER — HYDROXYZINE 25 MG/1
TABLET, FILM COATED ORAL
Status: DISCONTINUED
Start: 2020-10-09 | End: 2020-10-09 | Stop reason: WASHOUT

## 2020-10-09 RX ORDER — LORAZEPAM 2 MG/ML
1 INJECTION INTRAMUSCULAR ONCE
Status: COMPLETED | OUTPATIENT
Start: 2020-10-09 | End: 2020-10-09

## 2020-10-09 RX ORDER — SODIUM CHLORIDE 0.9 % (FLUSH) 0.9 %
10 SYRINGE (ML) INJECTION
Status: COMPLETED | OUTPATIENT
Start: 2020-10-09 | End: 2020-10-09

## 2020-10-09 RX ORDER — ATOMOXETINE 40 MG/1
40 CAPSULE ORAL DAILY
COMMUNITY

## 2020-10-09 RX ORDER — LANCETS
EACH MISCELLANEOUS 3 TIMES DAILY
COMMUNITY
Start: 2020-08-18

## 2020-10-09 RX ORDER — ATORVASTATIN CALCIUM 80 MG/1
80 TABLET, FILM COATED ORAL DAILY
COMMUNITY
Start: 2020-08-26 | End: 2021-08-26

## 2020-10-09 RX ORDER — PEN NEEDLE, DIABETIC 31 GX3/16"
NEEDLE, DISPOSABLE MISCELLANEOUS
COMMUNITY
Start: 2020-08-18 | End: 2021-09-12

## 2020-10-09 RX ORDER — GADOTERATE MEGLUMINE 376.9 MG/ML
27 INJECTION INTRAVENOUS
Status: COMPLETED | OUTPATIENT
Start: 2020-10-09 | End: 2020-10-09

## 2020-10-09 RX ORDER — ZOLPIDEM TARTRATE 5 MG/1
5 TABLET ORAL
COMMUNITY
Start: 2020-08-19

## 2020-10-09 RX ADMIN — ASPIRIN 325 MG: 325 TABLET, FILM COATED ORAL at 01:12

## 2020-10-09 RX ADMIN — LORAZEPAM 1 MG: 1 TABLET ORAL at 03:04

## 2020-10-09 RX ADMIN — Medication 10 ML: at 07:25

## 2020-10-09 RX ADMIN — GADOTERATE MEGLUMINE 27 ML: 376.9 INJECTION INTRAVENOUS at 07:25

## 2020-10-09 RX ADMIN — LORAZEPAM 1 MG: 2 INJECTION INTRAMUSCULAR; INTRAVENOUS at 06:42

## 2020-10-09 RX ADMIN — HYDROXYZINE PAMOATE 25 MG: 25 CAPSULE ORAL at 01:12

## 2020-10-09 NOTE — ED NOTES
I have reviewed discharge instructions with the patient. The patient verbalized understanding. Patient left ED via Discharge Method: stretcher to [facility] with [employer]. .    Opportunity for questions and clarification provided. Patient given 1 scripts. To continue your aftercare when you leave the hospital, you may receive an automated call from our care team to check in on how you are doing. This is a free service and part of our promise to provide the best care and service to meet your aftercare needs.  If you have questions, or wish to unsubscribe from this service please call [phone]. Thank you for Choosing our [facility] Emergency Department.

## 2020-10-09 NOTE — ED TRIAGE NOTES
Pt to the ED with c/o of \"facial palsy\" that started appx 11pm. Pt states that he was also stated on bactrim today a \"spot on his head\" pt states that after that his right side of face got numb pt also states that he has a headahce and has a \"Stroke\" in the past.       Pt masked prior to arrival

## 2020-10-09 NOTE — PROGRESS NOTES
Pt states that he is juan. Dr Yan Hinkle made awarestrophobic and requesting that he receive something before he goes to MRI. Dr Yan Hinkle made aware and orders written.

## 2020-10-09 NOTE — ED NOTES
This RN spoke with pt's RN at the [facility], Ghislaine Dhaliwal, [address] [facility]. Facility RN updated on pt's current condition and plan of care for the next few hours.

## 2020-10-09 NOTE — ED PROVIDER NOTES
HPI   24-year-old male history of DM 2, hypertension, obesity presents to the ED with complaint of right-sided facial droop. Patient came from [facility]. Onset tonight at 10:30 PM.  Patient states he recently got treated for cellulitis or early abscess on his scalp today. Started on Bactrim. He has had Bactrim in the for without issue. He had some facial tingling earlier but then developed right-sided facial droop. No motor weakness in the arms or legs. He is able to close his eyes and raise his eyebrows without apparent difficulty. He reports he had a history of stroke in the past although review of medical records from care everywhere shows he was evaluated for stroke at [facility] about 2 years ago and had negative MRI brain and MRA head and neck. Glucose 143 at triage.   Past Medical History:   Diagnosis Date    Arrhythmia     \" artery in heart goes into spams\"  no meds    Depression with anxiety 4/11/2017    Diabetes ([facility] [address]     type ll age 25, avgfbs 320, last A1C was 17 ,     Essential hypertension 9/2/2018    Hypercholesterolemia     Morbid obesity (HCC)     bmi 17    Nausea & vomiting     Renal stone     Stroke [facility])        Past Surgical History:   Procedure Laterality Date    HX ADENOIDECTOMY  age13    HX APPENDECTOMY  1996    HX ORTHOPAEDIC      left knee x 3  scopes    HX ORTHOPAEDIC  2015    right shoulder repair    HX TONSILLECTOMY  age 10         Family History:   Problem Relation Age of Onset    Kidney Disease Mother     Diabetes Father     Kidney Disease Father        Social History     Socioeconomic History    Marital status: LEGALLY      Spouse name: Not on file    Number of children: Not on file    Years of education: Not on file    Highest education level: Not on file   Occupational History    Not on file   Social Needs    Financial resource strain: Not on file    Food insecurity     Worry: Not on file     Inability: Not on file    Transportation needs     Medical: Not on file     Non-medical: Not on file   Tobacco Use    Smoking status: Never Smoker    Smokeless tobacco: Never Used   Substance and Sexual Activity    Alcohol use: Yes     Comment: occasional    Drug use: No    Sexual activity: Yes     Partners: Female     Birth control/protection: None   Lifestyle    Physical activity     Days per week: Not on file     Minutes per session: Not on file    Stress: Not on file   Relationships    Social connections     Talks on phone: Not on file     Gets together: Not on file     Attends Bahai service: Not on file     Active member of club or organization: Not on file     Attends meetings of clubs or organizations: Not on file     Relationship status: Not on file    Intimate partner violence     Fear of current or ex partner: Not on file     Emotionally abused: Not on file     Physically abused: Not on file     Forced sexual activity: Not on file   Other Topics Concern    Not on file   Social History Narrative    Not on file         ALLERGIES: Phenergan [promethazine] and Metformin    Review of Systems  Review of Systems Negative Except as in HPI    Vitals:    10/08/20 2356   BP: (!) 143/84   Pulse: 88   Resp: 16   Temp: 98.3 °F (36.8 °C)   SpO2: 99%   Weight: 136.1 kg (300 lb)   Height: 6' (1.829 m)            Physical Exam  Vitals signs and nursing note reviewed. Constitutional:       General: He is not in acute distress. Appearance: He is not ill-appearing, toxic-appearing or diaphoretic. HENT:      Head: Atraumatic. Nose: Nose normal.      Mouth/Throat:      Mouth: Mucous membranes are moist.   Eyes:      Extraocular Movements: Extraocular movements intact. Pupils: Pupils are equal, round, and reactive to light. Comments: No ptosis. Cardiovascular:      Rate and Rhythm: Normal rate and regular rhythm. Pulmonary:      Effort: Pulmonary effort is normal.      Breath sounds: Normal breath sounds.    Abdominal:      Palpations: Abdomen is soft.      Tenderness: There is no abdominal tenderness. Musculoskeletal:      Comments: Neck and back grossly unremarkable. No acute gross extremity abnormalities noted. Skin:     General: Skin is warm and dry. Comments: Small focal area of cellulitis or early abscess on the left frontoparietal scalp. Neurological:      Comments: Awake alert. GCS 15. Patient has right-sided lower facial paralysis (2). No ptosis. Patient is able to close his eyelids well. Raises forehead muscles without apparent paresis. No dysarthria or aphasia. Tongue deviates slightly to the right. No drift in the upper or lower extremities. Light touch sensation grossly intact in the upper and lower extremities. Patient does report difference in light touch discrimination on the right side of face compared with left. Psychiatric:         Behavior: Behavior normal.          MDM     Initial impression and treatment plan  Nontoxic-appearing 53-year-old male presenting to the ED with right-sided facial droop which reportedly started about 10:30 PM tonight. As he is able to raise his forehead muscles and close his eyes without apparent paresis, Bell's palsy seems less likely, so code CVA called. NIHSS 3 based on 2-lower facial paralysis and light ouch sensation differences in face, 1.   Glucose 143 at triage    Procedures    Recent Results (from the past 12 hour(s))   CBC WITH AUTOMATED DIFF    Collection Time: 10/09/20 12:07 AM   Result Value Ref Range    WBC 9.2 4.3 - 11.1 K/uL    RBC 5.26 4.23 - 5.6 M/uL    HGB 13.3 (L) 13.6 - 17.2 g/dL    HCT 40.5 (L) 41.1 - 50.3 %    MCV 77.0 (L) 79.6 - 97.8 FL    MCH 25.3 (L) 26.1 - 32.9 PG    MCHC 32.8 31.4 - 35.0 g/dL    RDW 13.7 11.9 - 14.6 %    PLATELET 050 681 - 863 K/uL    MPV 8.9 (L) 9.4 - 12.3 FL    ABSOLUTE NRBC 0.00 0.0 - 0.2 K/uL    DF AUTOMATED      NEUTROPHILS 70 43 - 78 %    LYMPHOCYTES 18 13 - 44 %    MONOCYTES 8 4.0 - 12.0 %    EOSINOPHILS 2 0.5 - 7.8 %    BASOPHILS 1 0.0 - 2.0 %    IMMATURE GRANULOCYTES 1 0.0 - 5.0 %    ABS. NEUTROPHILS 6.5 1.7 - 8.2 K/UL    ABS. LYMPHOCYTES 1.6 0.5 - 4.6 K/UL    ABS. MONOCYTES 0.8 0.1 - 1.3 K/UL    ABS. EOSINOPHILS 0.2 0.0 - 0.8 K/UL    ABS. BASOPHILS 0.1 0.0 - 0.2 K/UL    ABS. IMM. GRANS. 0.1 0.0 - 0.5 K/UL   METABOLIC PANEL, BASIC    Collection Time: 10/09/20 12:07 AM   Result Value Ref Range    Sodium 134 (L) 136 - 145 mmol/L    Potassium 4.2 3.5 - 5.1 mmol/L    Chloride 101 98 - 107 mmol/L    CO2 27 21 - 32 mmol/L    Anion gap 6 (L) 7 - 16 mmol/L    Glucose 137 (H) 65 - 100 mg/dL    BUN 13 6 - 23 MG/DL    Creatinine 1.19 0.8 - 1.5 MG/DL    GFR est AA >60 >60 ml/min/1.73m2    GFR est non-AA >60 >60 ml/min/1.73m2    Calcium 8.9 8.3 - 10.4 MG/DL   PROTHROMBIN TIME + INR    Collection Time: 10/09/20 12:07 AM   Result Value Ref Range    Prothrombin time 12.8 12.0 - 14.7 sec    INR 0.9     GLUCOSE, POC    Collection Time: 10/09/20 12:08 AM   Result Value Ref Range    Glucose (POC) 143 (H) 65 - 100 mg/dL       Ct Perf W Cbf    Result Date: 10/9/2020  EXAM: CT brain perfusion. INDICATION: Facial numbness. COMPARISON: None. TECHNIQUE:  CT brain perfusion images were obtained after the intravenous injection of Omnipaque nonionic CT contrast.  Images were post-processed utilizing RAPID software, with generation of CBF, CBV, MTT and Tmax color maps. Radiation dose reduction techniques were utilized for this study. Our CT scanners use one or all of the following:  Automated exposure control, adjustment of the mA and/or kV according to patient size, iterative reconstruction. FINDINGS: No areas of hypoperfusion are identified. The CBF <30% volume is 0 mL. The Tmax > 6 second volume is 0 mL. IMPRESSION: As above. Cta Code Neuro Head And Neck W Cont    Result Date: 10/9/2020  EXAM: CT angiogram of the head and neck. INDICATION: Facial numbness. COMPARISON: None.  TECHNIQUE: Axial CT images of the head and neck were obtained in the arterial phase after the intravenous injection of 100 mL Isovue 370 CT contrast. Stenosis is graded according to the NASCET criterion. 3D reformatted images were performed. Radiation dose reduction techniques were used for this study. Our CT scanners use one or all of the following:  Automated exposure control, adjustment of the mA or kV according to patient size, iterative reconstruction. FINDINGS: No aneurysm, dissection or high-grade vessel stenosis is identified. There is no neck mass or adenopathy. There are surgical clips in the right neck. IMPRESSION: No aneurysm, dissection or high-grade vessel stenosis in the arteries of the head or neck. Ct Code Neuro Head Wo Contrast    Result Date: 10/9/2020  EXAM: Noncontrast CT head. INDICATION: Facial numbness. COMPARISON: None. TECHNIQUE: Noncontrast CT images of the head were obtained. Radiation dose reduction techniques were used for this study. Our CT scanners use one or all of the following:  Automated exposure control, adjustment of the mA or kV according to patient size, iterative reconstruction. FINDINGS: Brain volume is appropriate for age. No acute infarct, hemorrhage or mass is identified. There is no mass effect, midline shift or depressed fracture. The visualized paranasal sinuses and mastoid air cells are clear. There is mild left frontal scalp swelling. IMPRESSION: 1. No acute intracranial process. 2. Mild left frontal scalp swelling, possibly an 8 mm sebaceous cyst. Correlate with physical exam.      Medications   sodium chloride 0.9 % bolus infusion 100 mL (has no administration in time range)   iopamidoL (ISOVUE-370) 76 % injection 100 mL (has no administration in time range)   saline peripheral flush soln 10 mL (has no administration in time range)         Update notes  12:23 AM-initial CT brain is unremarkable. Tele-neurology evaluation pending. 12:39 AM-CTA head and neck without LVO. CTP without areas of hypoperfusion.   Pending tele-neurology evaluation. 12:59 [id number] evaluated the patient. On their evaluation they feel the patient does not have true facial droop and that part of this may be psychogenic. When he is not directly being tested on facial droop he moves the lower part of his face normally. However he does have risk factors and they did recommend getting MRI brain. Also giving aspirin 325. tPA not recommended. He is already on atorvastatin. If MRI brain is normal, tele-neurology feels could be discharged. Cannot get MRI brain in the middle the night. Will observe overnight and get MRI brain in the morning if this is unremarkable can DC back to [facility].    1:13 AM-ordered MRI brain. Discussed case with Dr. Michelle Mercado. Does not seem to emergent so we will wait for the MRI brain till tomorrow morning. Please note, this chart was dictated using Dragon dictation, voice recognition software. While efforts were made to correct any transcription errors, some may inadvertently remain. Please forgive punctuation and typographic/voice recognition errors. Please contact me with any questions concerns or for clarification of documentation.

## 2020-10-09 NOTE — DISCHARGE INSTRUCTIONS
There is no evidence of stroke or Bell's palsy on today's work-up    Continue taking your Bactrim as previously directed    Follow-up with your primary care physician.

## 2022-03-19 PROBLEM — E78.00 HYPERCHOLESTEROLEMIA: Status: ACTIVE | Noted: 2017-01-25

## 2022-03-19 PROBLEM — E11.42 DIABETIC POLYNEUROPATHY ASSOCIATED WITH TYPE 2 DIABETES MELLITUS (HCC): Status: ACTIVE | Noted: 2019-08-21

## 2022-03-19 PROBLEM — E66.01 SEVERE OBESITY (HCC): Status: ACTIVE | Noted: 2020-01-31

## 2022-03-19 PROBLEM — F41.8 DEPRESSION WITH ANXIETY: Status: ACTIVE | Noted: 2017-04-11

## 2022-03-20 PROBLEM — N52.1 ERECTILE DYSFUNCTION DUE TO DISEASES CLASSIFIED ELSEWHERE: Status: ACTIVE | Noted: 2019-08-06

## 2022-03-20 PROBLEM — I10 ESSENTIAL HYPERTENSION: Status: ACTIVE | Noted: 2018-09-02

## 2022-04-21 ENCOUNTER — APPOINTMENT (OUTPATIENT)
Dept: CT IMAGING | Age: 37
End: 2022-04-21
Attending: EMERGENCY MEDICINE
Payer: COMMERCIAL

## 2022-04-21 ENCOUNTER — HOSPITAL ENCOUNTER (EMERGENCY)
Age: 37
Discharge: HOME | End: 2022-04-21
Attending: EMERGENCY MEDICINE
Payer: COMMERCIAL

## 2022-04-21 VITALS
BODY MASS INDEX: 41.31 KG/M2 | HEIGHT: 72 IN | RESPIRATION RATE: 18 BRPM | DIASTOLIC BLOOD PRESSURE: 87 MMHG | OXYGEN SATURATION: 98 % | TEMPERATURE: 97.6 F | HEART RATE: 79 BPM | WEIGHT: 305 LBS | SYSTOLIC BLOOD PRESSURE: 139 MMHG

## 2022-04-21 DIAGNOSIS — R10.32 ABDOMINAL PAIN, LLQ (LEFT LOWER QUADRANT): Primary | ICD-10-CM

## 2022-04-21 DIAGNOSIS — R31.9 HEMATURIA, UNSPECIFIED TYPE: ICD-10-CM

## 2022-04-21 LAB
ALBUMIN SERPL-MCNC: 3.9 G/DL (ref 3.5–5)
ALBUMIN/GLOB SERPL: 1.1 {RATIO}
ALP SERPL-CCNC: 221 U/L (ref 45–117)
ALT SERPL-CCNC: 38 U/L (ref 13–61)
ANION GAP SERPL CALC-SCNC: 13 MMOL/L (ref 7–16)
APPEARANCE UR: CLEAR
AST SERPL-CCNC: 22 U/L (ref 15–37)
BACTERIA URNS QL MICRO: NORMAL /HPF
BASOPHILS # BLD: 0.1 K/UL (ref 0–0.2)
BASOPHILS NFR BLD: 1 % (ref 0–2)
BILIRUB SERPL-MCNC: 0.4 MG/DL (ref 0.2–1.1)
BILIRUB UR QL: NEGATIVE
BUN SERPL-MCNC: 18 MG/DL (ref 7–18)
CALCIUM SERPL-MCNC: 9.6 MG/DL (ref 8.3–10.4)
CASTS URNS QL MICRO: 0 /LPF
CHLORIDE SERPL-SCNC: 101 MMOL/L (ref 98–107)
CO2 SERPL-SCNC: 25 MMOL/L (ref 21–32)
COLOR UR: ABNORMAL
CREAT SERPL-MCNC: 1.05 MG/DL (ref 0.8–1.5)
CRYSTALS URNS QL MICRO: 0 /LPF
DIFFERENTIAL METHOD BLD: ABNORMAL
EOSINOPHIL # BLD: 0.3 K/UL (ref 0–0.8)
EOSINOPHIL NFR BLD: 3 % (ref 0.5–7.8)
EPI CELLS #/AREA URNS HPF: 0 /HPF
ERYTHROCYTE [DISTWIDTH] IN BLOOD BY AUTOMATED COUNT: 14.4 % (ref 11.9–14.6)
GLOBULIN SER CALC-MCNC: 3.6 G/DL (ref 2.3–3.5)
GLUCOSE SERPL-MCNC: 249 MG/DL (ref 65–100)
GLUCOSE UR STRIP.AUTO-MCNC: NEGATIVE MG/DL
HCT VFR BLD AUTO: 38.7 % (ref 41.1–50.3)
HGB BLD-MCNC: 12.7 G/DL (ref 13.6–17.2)
HGB UR QL STRIP: NEGATIVE
IMM GRANULOCYTES # BLD AUTO: 0 K/UL (ref 0–0.5)
IMM GRANULOCYTES NFR BLD AUTO: 0 % (ref 0–5)
KETONES UR QL STRIP.AUTO: NEGATIVE MG/DL
LEUKOCYTE ESTERASE UR QL STRIP.AUTO: NEGATIVE
LIPASE SERPL-CCNC: 23 U/L (ref 13–60)
LYMPHOCYTES # BLD: 1.8 K/UL (ref 0.5–4.6)
LYMPHOCYTES NFR BLD: 23 % (ref 13–44)
MCH RBC QN AUTO: 25.3 PG (ref 26.1–32.9)
MCHC RBC AUTO-ENTMCNC: 32.8 G/DL (ref 31.4–35)
MCV RBC AUTO: 77.2 FL (ref 79.6–97.8)
MONOCYTES # BLD: 0.5 K/UL (ref 0.1–1.3)
MONOCYTES NFR BLD: 6 % (ref 4–12)
MUCOUS THREADS URNS QL MICRO: 0 /LPF
NEUTS SEG # BLD: 5.1 K/UL (ref 1.7–8.2)
NEUTS SEG NFR BLD: 66 % (ref 43–78)
NITRITE UR QL STRIP.AUTO: NEGATIVE
NRBC # BLD: 0 K/UL (ref 0–0.2)
PH UR STRIP: 5 [PH] (ref 5–9)
PLATELET # BLD AUTO: 302 K/UL (ref 150–450)
PMV BLD AUTO: 9.4 FL (ref 9.4–12.3)
POTASSIUM SERPL-SCNC: 4.8 MMOL/L (ref 3.5–5.1)
PROT SERPL-MCNC: 7.5 G/DL (ref 6.4–8.2)
PROT UR STRIP-MCNC: ABNORMAL MG/DL
RBC # BLD AUTO: 5.01 M/UL (ref 4.23–5.6)
RBC #/AREA URNS HPF: 0 /HPF
SODIUM SERPL-SCNC: 139 MMOL/L (ref 136–145)
SP GR UR REFRACTOMETRY: >=1.03 (ref 1–1.02)
UROBILINOGEN UR QL STRIP.AUTO: 0.2 EU/DL (ref 0.2–1)
WBC # BLD AUTO: 7.7 K/UL (ref 4.3–11.1)
WBC URNS QL MICRO: NORMAL /HPF

## 2022-04-21 PROCEDURE — 74011250636 HC RX REV CODE- 250/636: Performed by: EMERGENCY MEDICINE

## 2022-04-21 PROCEDURE — 83690 ASSAY OF LIPASE: CPT

## 2022-04-21 PROCEDURE — 81003 URINALYSIS AUTO W/O SCOPE: CPT

## 2022-04-21 PROCEDURE — 85025 COMPLETE CBC W/AUTO DIFF WBC: CPT

## 2022-04-21 PROCEDURE — 81015 MICROSCOPIC EXAM OF URINE: CPT

## 2022-04-21 PROCEDURE — 74176 CT ABD & PELVIS W/O CONTRAST: CPT

## 2022-04-21 PROCEDURE — 80053 COMPREHEN METABOLIC PANEL: CPT

## 2022-04-21 PROCEDURE — 75810000275 HC EMERGENCY DEPT VISIT NO LEVEL OF CARE

## 2022-04-21 RX ORDER — ONDANSETRON 2 MG/ML
8 INJECTION INTRAMUSCULAR; INTRAVENOUS
Status: COMPLETED | OUTPATIENT
Start: 2022-04-21 | End: 2022-04-21

## 2022-04-21 RX ORDER — NAPROXEN 375 MG/1
375 TABLET ORAL 2 TIMES DAILY WITH MEALS
Qty: 14 TABLET | Refills: 0 | Status: SHIPPED | OUTPATIENT
Start: 2022-04-21

## 2022-04-21 RX ORDER — SODIUM CHLORIDE 0.9 % (FLUSH) 0.9 %
5-10 SYRINGE (ML) INJECTION EVERY 8 HOURS
Status: DISCONTINUED | OUTPATIENT
Start: 2022-04-21 | End: 2022-04-21 | Stop reason: HOSPADM

## 2022-04-21 RX ORDER — KETOROLAC TROMETHAMINE 30 MG/ML
30 INJECTION, SOLUTION INTRAMUSCULAR; INTRAVENOUS
Status: COMPLETED | OUTPATIENT
Start: 2022-04-21 | End: 2022-04-21

## 2022-04-21 RX ORDER — MORPHINE SULFATE 4 MG/ML
4 INJECTION INTRAVENOUS
Status: COMPLETED | OUTPATIENT
Start: 2022-04-21 | End: 2022-04-21

## 2022-04-21 RX ORDER — SODIUM CHLORIDE 0.9 % (FLUSH) 0.9 %
5-10 SYRINGE (ML) INJECTION AS NEEDED
Status: DISCONTINUED | OUTPATIENT
Start: 2022-04-21 | End: 2022-04-21 | Stop reason: HOSPADM

## 2022-04-21 RX ADMIN — ONDANSETRON 8 MG: 2 INJECTION INTRAMUSCULAR; INTRAVENOUS at 14:28

## 2022-04-21 RX ADMIN — MORPHINE SULFATE 4 MG: 4 INJECTION INTRAVENOUS at 15:09

## 2022-04-21 RX ADMIN — KETOROLAC TROMETHAMINE 30 MG: 30 INJECTION, SOLUTION INTRAMUSCULAR; INTRAVENOUS at 14:28

## 2022-04-21 RX ADMIN — SODIUM CHLORIDE 1000 ML: 9 INJECTION, SOLUTION INTRAVENOUS at 14:18

## 2022-04-21 NOTE — ED NOTES
I have reviewed discharge instructions with the patient. The patient verbalized understanding. Patient left ED via Discharge Method: ambulatory to Home with self  Opportunity for questions and clarification provided. Patient given 1 scripts. To continue your aftercare when you leave the hospital, you may receive an automated call from our care team to check in on how you are doing. This is a free service and part of our promise to provide the best care and service to meet your aftercare needs.  If you have questions, or wish to unsubscribe from this service please call [phone]. Thank you for Choosing our [facility] Emergency Department.

## 2022-04-21 NOTE — ED PROVIDER NOTES
Mask was worn during the entire patient examination. Mellisa Clay is a 43 y.o. male who presents to the ED with a chief complaint of Left lower quadrant pain. Patient has a history of kidney stones states his last one was a couple years ago states he has not been able to pass the stones in the previous episodes. He feels like today's pain is similar to a kidney stone. It is sharp constant does not radiate. No back pain. He has had some urinary frequency but no change in the color and no blood in it. No vomiting has had some high blood sugars which he states is not that unusual as he has had some difficulty maintaining tight control recently.            Past Medical History:   Diagnosis Date    Arrhythmia     \" artery in heart goes into spams\"  no meds    Depression with anxiety 4/11/2017    Diabetes ([facility] [address]     type ll age 25, avgfbs 320, last A1C was 15 ,     Essential hypertension 9/2/2018    Hypercholesterolemia     Morbid obesity (HCC)     bmi 17    Nausea & vomiting     Renal stone     Stroke [facility])        Past Surgical History:   Procedure Laterality Date    HX ADENOIDECTOMY  age15    HX AMPUTATION TOE      HX APPENDECTOMY  1996    HX ORTHOPAEDIC      left knee x 3  scopes    HX ORTHOPAEDIC  2015    right shoulder repair    HX TONSILLECTOMY  age 10         Family History:   Problem Relation Age of Onset    Kidney Disease Mother     Diabetes Father     Kidney Disease Father        Social History     Socioeconomic History    Marital status: LEGALLY      Spouse name: Not on file    Number of children: Not on file    Years of education: Not on file    Highest education level: Not on file   Occupational History    Not on file   Tobacco Use    Smoking status: Never Smoker    Smokeless tobacco: Never Used   Substance and Sexual Activity    Alcohol use: Yes     Comment: occasional    Drug use: No    Sexual activity: Yes     Partners: Female     Birth control/protection: None Other Topics Concern    Not on file   Social History Narrative    Not on file     Social Determinants of Health     Financial Resource Strain:     Difficulty of Paying Living Expenses: Not on file   Food Insecurity:     Worried About Running Out of Food in the Last Year: Not on file    Munira of Food in the Last Year: Not on file   Transportation Needs:     Lack of Transportation (Medical): Not on file    Lack of Transportation (Non-Medical): Not on file   Physical Activity:     Days of Exercise per Week: Not on file    Minutes of Exercise per Session: Not on file   Stress:     Feeling of Stress : Not on file   Social Connections:     Frequency of Communication with Friends and Family: Not on file    Frequency of Social Gatherings with Friends and Family: Not on file    Attends [employer] Services: Not on file    Active Member of [address] [place] [address] or Organizations: Not on file    Attends Club or Organization Meetings: Not on file    Marital Status: Not on file   Intimate Partner Violence:     Fear of Current or Ex-Partner: Not on file    Emotionally Abused: Not on file    Physically Abused: Not on file    Sexually Abused: Not on file   Housing Stability:     Unable to Pay for Housing in the Last Year: Not on file    Number of [place] in the Last Year: Not on file    Unstable Housing in the Last Year: Not on file         ALLERGIES: Phenergan [promethazine] and Metformin    Review of Systems   Constitutional: Negative. Negative for activity change, chills, diaphoresis, fatigue and fever. HENT: Negative for congestion. Eyes: Negative for photophobia, discharge and visual disturbance. Respiratory: Negative for apnea, cough, chest tightness, shortness of breath, wheezing and stridor. Cardiovascular: Negative for chest pain and palpitations. Gastrointestinal: Positive for abdominal pain and nausea. Negative for abdominal distention, diarrhea and vomiting.    Genitourinary: Positive for difficulty urinating and frequency. Negative for flank pain. Musculoskeletal: Negative for arthralgias, back pain, neck pain and neck stiffness. Skin: Negative for color change, pallor and wound. Neurological: Negative for weakness and numbness. All other systems reviewed and are negative. Vitals:    04/21/22 1347   BP: 125/83   Pulse: 79   Resp: 18   Temp: 97.6 °F (36.4 °C)   SpO2: 99%   Weight: 138.3 kg (305 lb)   Height: 6' (1.829 m)            Physical Exam  Vitals and nursing note reviewed. Constitutional:       General: He is not in acute distress. Appearance: He is well-developed. He is obese. He is not ill-appearing, toxic-appearing or diaphoretic. HENT:      Head: Normocephalic and atraumatic. Eyes:      General: No scleral icterus. Conjunctiva/sclera: Conjunctivae normal.   Neck:      Trachea: No tracheal deviation. Cardiovascular:      Rate and Rhythm: Normal rate and regular rhythm. Heart sounds: No murmur heard. No friction rub. No gallop. Pulmonary:      Effort: Pulmonary effort is normal. No respiratory distress. Breath sounds: No stridor. No wheezing or rhonchi. Abdominal:      Tenderness: There is abdominal tenderness in the left lower quadrant. There is no right CVA tenderness, left CVA tenderness, guarding or rebound. Hernia: No hernia is present. Neurological:      Mental Status: He is alert. Mental status is at baseline. Psychiatric:         Mood and Affect: Mood normal.         Behavior: Behavior normal.          MDM  Number of Diagnoses or Management Options  Abdominal pain, LLQ (left lower quadrant)  Hematuria, unspecified type  Diagnosis management comments: Patients labs and ct scan are normal.  He did have some small amount of blood in the urine possibly recently passed stone. Arina Cm MD; 4/21/2022 @9:53 PM Voice dictation software was used during the making of this note.   This software is not perfect and grammatical and other typographical errors may be present. This note has not been proofread for errors.  ====================================        Amount and/or Complexity of Data Reviewed  Clinical lab tests: reviewed and ordered (Results for orders placed or performed during the hospital encounter of 04/21/22  -CBC WITH AUTOMATED DIFF:        Result                      Value             Ref Range           WBC                         7.7               4.3 - 11.1 K*       RBC                         5.01              4.23 - 5.60 *       HGB                         12.7 (L)          13.6 - 17.2 *       HCT                         38.7 (L)          41.1 - 50.3 %       MCV                         77.2 (L)          79.6 - 97.8 *       MCH                         25.3 (L)          26.1 - 32.9 *       MCHC                        32.8              31.4 - 35.0 *       RDW                         14.4              11.9 - 14.6 %       PLATELET                    302               150 - 450 K/*       MPV                         9.4               9.4 - 12.3 FL       ABSOLUTE NRBC               0.00              0.0 - 0.2 K/*       DF                          AUTOMATED                             NEUTROPHILS                 66                43 - 78 %           LYMPHOCYTES                 23                13 - 44 %           MONOCYTES                   6                 4.0 - 12.0 %        EOSINOPHILS                 3                 0.5 - 7.8 %         BASOPHILS                   1                 0.0 - 2.0 %         IMMATURE GRANULOCYTES       0                 0.0 - 5.0 %         ABS. NEUTROPHILS            5.1               1.7 - 8.2 K/*       ABS. LYMPHOCYTES            1.8               0.5 - 4.6 K/*       ABS. MONOCYTES              0.5               0.1 - 1.3 K/*       ABS. EOSINOPHILS            0.3               0.0 - 0.8 K/*       ABS. BASOPHILS              0.1               0.0 - 0.2 K/*       ABS. IMM.  GRANS.            0.0 0.0 - 0.5 K/*  -METABOLIC PANEL, COMPREHENSIVE:        Result                      Value             Ref Range           Sodium                      139               136 - 145 mm*       Potassium                   4.8               3.5 - 5.1 mm*       Chloride                    101               98 - 107 mmo*       CO2                         25                21 - 32 mmol*       Anion gap                   13                7.0 - 16.0 m*       Glucose                     249 (H)           65 - 100 mg/*       BUN                         18                7.0 - 18.0 M*       Creatinine                  1.05              0.8 - 1.5 MG*       GFR est AA                  >102              >60 ml/min/1*       GFR est non-AA              >60               >60 ml/min/1*       Calcium                     9.6               8.3 - 10.4 M*       Bilirubin, total            0.4               0.2 - 1.1 MG*       ALT (SGPT)                  38                13.0 - 61.0 *       AST (SGOT)                  22                15 - 37 U/L         Alk.  phosphatase            221 (H)           45.0 - 117.0*       Protein, total              7.5               6.4 - 8.2 g/*       Albumin                     3.9               3.5 - 5.0 g/*       Globulin                    3.6 (H)           2.3 - 3.5 g/*       A-G Ratio                   1.1                              -LIPASE:        Result                      Value             Ref Range           Lipase                      23                13 - 60 U/L    -URINALYSIS W/ RFLX MICROSCOPIC:        Result                      Value             Ref Range           Color                       SETH                                 Appearance                  CLEAR                                 Specific gravity            >=1.030           1.001 - 1.023       pH (UA)                     5.0               5.0 - 9.0           Protein                     TRACE (A)         NEG mg/dL Glucose                     Negative          mg/dL               Ketone                      Negative          NEG mg/dL           Bilirubin                   Negative          NEG                 Blood                       Negative          NEG                 Urobilinogen                0.2               0.2 - 1.0 EU*       Nitrites                    Negative          NEG                 Leukocyte Esterase          Negative          NEG            -URINE MICROSCOPIC:        Result                      Value             Ref Range           WBC                         0-3               0 /hpf              RBC                         0                 0 /hpf              Epithelial cells            0                 0 /hpf              Bacteria                    TRACE             0 /hpf              Casts                       0                 0 /lpf              Crystals, urine             0                 0 /LPF              Mucus                       0                 0 /lpf        )  Tests in the radiology section of CPT®: ordered and reviewed (CT UROGRAM WO CONT    Result Date: 4/21/2022  CT ABDOMEN AND PELVIS WITHOUT INTRAVENOUS CONTRAST, 4/21/2022. History: History of kidney stones with left lower quadrant pain beginning this morning and nausea. Comparison: CT abdomen and pelvis without contrast 7/21/2019 Technique:   Multiple contiguous helical CT images reconstructed at 5 mm were obtained from above the diaphragms through the ischial tuberosities without the administration of contrast per the institutional stone protocol. All CT scans performed at this facility use one or all of the following: Automated exposure control, adjustment of the mA and/or kVp according to patient's size, iterative reconstruction. Findings: CT ABDOMEN:  Limited evaluation of the lung bases and base of the mediastinum demonstrates no significant abnormalities. Only mild basilar atelectatic appearing changes are seen. Evaluation of the solid organs is limited due to partial visualization and non- contrasted technique. The Liver is homogeneous in attenuation. The spleen is homogeneous in attenuation. No contour deforming lesions are seen of the pancreas or adrenal glands. The gallbladder has an unremarkable CT appearance without radiopaque stones or pericholecystic fluid/inflammatory changes. 2 tiny stones measuring 1 to 2 mm in size are seen in the midpole collecting system of the left kidney. However, no radiopaque stones are seen along the course of the ureters. In addition, no hydronephrosis, or hydroureter is seen. No acute changes are seen of the small or large bowel; and no free air/fluid or focal inflammatory changes are seen. Evaluation for these changes is somewhat limited due to non- contrast technique, however. The appendix appears to have been surgically resected. Mild diverticulosis is seen of the left colon without acute inflammatory changes. No acute osseous abnormality is seen. CT PELVIS: No abnormal pelvic fluid collections are present. The urinary bladder is unremarkable. 1.  No renal/ureteral stones or evidence for hydronephrosis/hydroureter. No acute changes are otherwise suggested on this somewhat limited noncontrast study. This report was made using voice transcription.  Despite my best efforts to avoid any, transcription errors may persist. If there is any question about the accuracy of the report or need for clarification, then please call [phone], or text me through [email or web address] for clarification or correction.    )           Procedures

## 2022-04-21 NOTE — ED TRIAGE NOTES
Pt states he has hx of kidney stones, states having LLQ pain started this morning with nausea. Also states type 2 diabetic, states blood sugar was over 500 at home.

## 2022-10-10 ENCOUNTER — APPOINTMENT (OUTPATIENT)
Dept: GENERAL RADIOLOGY | Age: 37
End: 2022-10-10
Payer: COMMERCIAL

## 2022-10-10 ENCOUNTER — HOSPITAL ENCOUNTER (EMERGENCY)
Age: 37
Discharge: HOME | End: 2022-10-10
Attending: EMERGENCY MEDICINE
Payer: COMMERCIAL

## 2022-10-10 VITALS
WEIGHT: 290 LBS | HEART RATE: 81 BPM | OXYGEN SATURATION: 97 % | BODY MASS INDEX: 39.28 KG/M2 | SYSTOLIC BLOOD PRESSURE: 149 MMHG | HEIGHT: 72 IN | TEMPERATURE: 98.6 F | DIASTOLIC BLOOD PRESSURE: 118 MMHG | RESPIRATION RATE: 18 BRPM

## 2022-10-10 DIAGNOSIS — M79.89 SWELLING OF RIGHT LOWER EXTREMITY: ICD-10-CM

## 2022-10-10 DIAGNOSIS — M79.671 RIGHT FOOT PAIN: Primary | ICD-10-CM

## 2022-10-10 DIAGNOSIS — M86.60 CHRONIC OSTEOMYELITIS (HCC): ICD-10-CM

## 2022-10-10 LAB
ALBUMIN SERPL-MCNC: 3.8 G/DL (ref 3.5–5)
ALBUMIN/GLOB SERPL: 1 {RATIO}
ALP SERPL-CCNC: 186 U/L (ref 45–117)
ALT SERPL-CCNC: 46 U/L (ref 13–61)
ANION GAP SERPL CALC-SCNC: 13 MMOL/L (ref 7–16)
AST SERPL-CCNC: 24 U/L (ref 15–37)
BASOPHILS # BLD: 0.1 K/UL (ref 0–0.2)
BASOPHILS NFR BLD: 1 % (ref 0–2)
BILIRUB SERPL-MCNC: 0.3 MG/DL (ref 0.2–1.1)
BUN SERPL-MCNC: 18 MG/DL (ref 7–18)
CALCIUM SERPL-MCNC: 8.9 MG/DL (ref 8.3–10.4)
CHLORIDE SERPL-SCNC: 100 MMOL/L (ref 98–107)
CO2 SERPL-SCNC: 25 MMOL/L (ref 21–32)
CREAT SERPL-MCNC: 1.4 MG/DL (ref 0.8–1.5)
DIFFERENTIAL METHOD BLD: ABNORMAL
EOSINOPHIL # BLD: 0.3 K/UL (ref 0–0.8)
EOSINOPHIL NFR BLD: 3 % (ref 0.5–7.8)
ERYTHROCYTE [DISTWIDTH] IN BLOOD BY AUTOMATED COUNT: 14.4 % (ref 11.9–14.6)
GLOBULIN SER CALC-MCNC: 3.7 G/DL (ref 2.3–3.5)
GLUCOSE SERPL-MCNC: 266 MG/DL (ref 65–100)
HCT VFR BLD AUTO: 41.2 % (ref 41.1–50.3)
HGB BLD-MCNC: 13.5 G/DL (ref 13.6–17.2)
IMM GRANULOCYTES # BLD AUTO: 0 K/UL (ref 0–0.5)
IMM GRANULOCYTES NFR BLD AUTO: 0 % (ref 0–5)
LACTATE SERPL-SCNC: 1.7 MMOL/L (ref 0.4–2)
LYMPHOCYTES # BLD: 1.5 K/UL (ref 0.5–4.6)
LYMPHOCYTES NFR BLD: 20 % (ref 13–44)
MCH RBC QN AUTO: 24.9 PG (ref 26.1–32.9)
MCHC RBC AUTO-ENTMCNC: 32.8 G/DL (ref 31.4–35)
MCV RBC AUTO: 76 FL (ref 79.6–97.8)
MONOCYTES # BLD: 0.4 K/UL (ref 0.1–1.3)
MONOCYTES NFR BLD: 6 % (ref 4–12)
NEUTS SEG # BLD: 5.2 K/UL (ref 1.7–8.2)
NEUTS SEG NFR BLD: 70 % (ref 43–78)
NRBC # BLD: 0 K/UL (ref 0–0.2)
PLATELET # BLD AUTO: 334 K/UL (ref 150–450)
PMV BLD AUTO: 9.7 FL (ref 9.4–12.3)
POTASSIUM SERPL-SCNC: 4.5 MMOL/L (ref 3.5–5.1)
PROCALCITONIN SERPL-MCNC: <0.05 NG/ML (ref 0–0.49)
PROT SERPL-MCNC: 7.5 G/DL (ref 6.4–8.2)
RBC # BLD AUTO: 5.42 M/UL (ref 4.23–5.6)
SODIUM SERPL-SCNC: 138 MMOL/L (ref 138–145)
WBC # BLD AUTO: 7.4 K/UL (ref 4.3–11.1)

## 2022-10-10 PROCEDURE — 84145 PROCALCITONIN (PCT): CPT

## 2022-10-10 PROCEDURE — 83605 ASSAY OF LACTIC ACID: CPT

## 2022-10-10 PROCEDURE — 99284 EMERGENCY DEPT VISIT MOD MDM: CPT

## 2022-10-10 PROCEDURE — 73630 X-RAY EXAM OF FOOT: CPT

## 2022-10-10 PROCEDURE — 85025 COMPLETE CBC W/AUTO DIFF WBC: CPT

## 2022-10-10 PROCEDURE — 80053 COMPREHEN METABOLIC PANEL: CPT

## 2022-10-10 ASSESSMENT — PAIN - FUNCTIONAL ASSESSMENT: PAIN_FUNCTIONAL_ASSESSMENT: 0-10

## 2022-10-10 ASSESSMENT — PAIN DESCRIPTION - LOCATION: LOCATION: FOOT

## 2022-10-10 ASSESSMENT — ENCOUNTER SYMPTOMS
VOMITING: 0
ABDOMINAL PAIN: 0
DIARRHEA: 0
SHORTNESS OF BREATH: 0
NAUSEA: 0

## 2022-10-10 ASSESSMENT — PAIN DESCRIPTION - ORIENTATION: ORIENTATION: RIGHT

## 2022-10-10 ASSESSMENT — PAIN SCALES - GENERAL: PAINLEVEL_OUTOF10: 7

## 2022-10-10 NOTE — ED NOTES
Patient states he needs to leave. IV pulled and PA notified. Patient asked to wait for discharge paperwork.       Maciel Conley RN  10/10/22 0407

## 2022-10-10 NOTE — DISCHARGE INSTRUCTIONS
Were evaluated in the emergency department today for acute on chronic right foot pain    Lab work today is reassuring. Procalcitonin lab still pending at time of discharge. This value will not change your course of treatment. Your x-ray does demonstrate that you have chronic osteomyelitis in your right foot. No signs of acute osteomyelitis at this time. Recommend that you contact your primary care provider to schedule follow-up this week or next    I have given your referral to [address] [place] [address] since you stated you would like a new orthopedic provider.     Keep your appointment with your vascular doctor on this Thursday    Return to the emergency department if increased pain, redness, warmth, fevers or chills, general worsening of your condition

## 2022-10-10 NOTE — ED PROVIDER NOTES
Emergency Department Provider Note                   PCP:                Galo Richardson MD               Age: 43 y.o. Sex: male       ICD-10-CM    1. Right foot pain  M79.671       2. Chronic osteomyelitis ([facility] [address] [address]  M86.[address]      3. Swelling of right lower extremity  M79.89           DISPOSITION Decision To Discharge 10/10/2022 05:04:19 PM       MDM  Number of Diagnoses or Management Options  Chronic osteomyelitis ([facility] [address]  Right foot pain  Swelling of right lower extremity  Diagnosis management comments: Vital signs reviewed, patient stable, NAD, afebrile, nontoxic in appearance     Will obtain x-ray of right foot  Will obtain CBC, CMP, Pro-Simon, lactic acid    Due to patient's history of osteomyelitis on multiple occasions, I feel lab work in addition to imaging is warranted at this time. Physical exam is reassuring. No erythema, induration, ulcer, drainage or weeping from right foot. No warmth of right foot. Abdomen soft and nontender, lungs clear to auscultation bilaterally. Patient is neurovascularly intact. No leukocytosis on CBC, no anemia,. Procalcitonin within normal limits. Lactic acid within normal limits  No concerning findings on CMP. X-ray right foot demonstrates chronic deformity at the second and third metatarsal heads most likely related to chronic osteomyelitis. No definite destructive changes present to indicate acute osteomyelitis  Nonspecific generalized soft tissue swelling. Patient has an appointment with his vascular surgeon in 4 days. Patient states he does not like his current orthopedic provider and would like to be referred to a new one. Put an electronic referral to [facility] orthopedic for chronic osteomyelitis. Consulted infectious disease via [employer]. Per Dr. Grace Henriquez patient does not need to follow-up within the infectious disease.   Dr. Mary Ellen Connor states that patient needs to follow-up with either vascular surgery or his orthopedist.    I had been discussing patient's physical exam findings, laboratory findings as they resulted, imaging findings, referrals and follow-up with patient during the course of his emergency department stay. I last spoke to the patient stating I was waiting on a reply from infectious disease. Patient stated that that was okay. Nurse asked if she can remove patient's IV per his request prior to discharge. Informed nurse that we could and we are waiting on infectious disease. Just prior to response from infectious disease provider, I was informed that patient left. I had already informed patient of signs and symptoms that would warrant a prompt return to the emergency department. I had already informed him that I had given him a referral to [facility] orthopedic and that he needed to keep his appointment with his vascular surgeon. Patient verbalized understanding of this. I have answered any questions that the patient had. Patient was discharged in stable condition. He left prior to receiving his discharge paperwork. Amount and/or Complexity of Data Reviewed  Clinical lab tests: ordered and reviewed  Tests in the radiology section of CPT®: ordered and reviewed  Review and summarize past medical records: yes  Discuss the patient with other providers: yes (Infectious disease)  Independent visualization of images, tracings, or specimens: yes (Independent visualization of imaging)    Risk of Complications, Morbidity, and/or Mortality  Presenting problems: moderate  Diagnostic procedures: moderate  Management options: low    Patient Progress  Patient progress: stable       ED Course as of 10/10/22 2246   Mon Oct 10, 2022   1556 Currently waiting on procalcitonin [JG]   1614 Calling DT lab to check on procal.  States that sample just arrived in error will be 45 minutes.  [JG]      ED Course User Index  [JG] DAPHNE Hawk        Orders Placed This Encounter   Procedures    XR FOOT RIGHT (MIN 3 VIEWS)    CBC with Auto Differential    Procalcitonin    Lactic Acid    Comprehensive Metabolic Panel    [address] [place] [address]         Medications - No data to display    Discharge Medication List as of 10/10/2022  5:09 PM           Johana Patel is a 43 y.o. male who presents to the Emergency Department with chief complaint of    Chief Complaint   Patient presents with    Foot Pain      68-year-old male with history of CAD, diabetic peripheral neuropathy, type 2 diabetes, hypertension, osteomyelitis, amputation right great toe, tubal DVTs right lower extremity and is currently on Eliquis presents to the emergency department today with chief complaint of worsening chronic pain in right foot. Patient states he has had pain in his right foot since November 2021 after amputation of his right great toe. Patient states that for the past week he has had been having increasing pain in the lateral aspect of his right foot. Patient denies injury, fevers, chills, nausea, vomiting, diarrhea, chest pain, headache. Patient states last time he had osteomyelitis, he did not have any fevers or chills and his only symptom was pain. Nothing makes patient's condition better. Nothing makes patient's condition worse. No treatments tried. The history is provided by the patient. No  was used. All other systems reviewed and are negative unless otherwise stated in the history of present illness section. Review of Systems   Constitutional:  Negative for chills and fever. Respiratory:  Negative for shortness of breath. Cardiovascular:  Positive for leg swelling (Continued swelling in right lower extremity). Negative for chest pain. Gastrointestinal:  Negative for abdominal pain, diarrhea, nausea and vomiting. Musculoskeletal:         Pain in right foot   Neurological:  Negative for headaches.    All other systems reviewed and are negative.     Past Medical History:   Diagnosis Date    Arrhythmia     \" artery in heart goes into spams\"  no meds    Depression with anxiety 4/11/2017    Diabetes ([facility]     type ll age 25, avgfbs 63, last A1C was 15 ,     Essential hypertension 9/2/2018    Hypercholesterolemia     Morbid obesity ([facility]     bmi 15    Nausea & vomiting     Renal stone     Stroke [facility])         Past Surgical History:   Procedure Laterality Date    ADENOIDECTOMY  age15    [facility] SURGERY  2015    right shoulder repair    ORTHOPEDIC SURGERY      left knee x 3  scopes    TOE AMPUTATION      TONSILLECTOMY  age 10        Family History   Problem Relation Age of Onset    Kidney Disease Mother     Diabetes Father     Kidney Disease Father         Social History     Socioeconomic History    Marital status:      Spouse name: None    Number of children: None    Years of education: None    Highest education level: None   Tobacco Use    Smoking status: Never    Smokeless tobacco: Never   Substance and Sexual Activity    Alcohol use: Yes    Drug use: No        Allergies: Promethazine and Metformin    Discharge Medication List as of 10/10/2022  5:09 PM        CONTINUE these medications which have NOT CHANGED    Details   rivaroxaban (XARELTO) 20 MG TABS tablet Take 20 mg by mouthHistorical Med      Lancets MISC 3 TIMES DAILY Starting Tue 8/18/2020, Historical Med      atomoxetine (STRATTERA) 40 MG capsule Take 40 mg by mouth dailyHistorical Med      busPIRone (BUSPAR) 10 MG tablet Take 10 mg by mouth 3 times dailyHistorical Med      glipiZIDE (GLUCOTROL) 10 MG tablet Take 10 mg by mouth 2 times dailyHistorical Med      !! insulin glargine (LANTUS;BASAGLAR) 100 UNIT/ML injection pen 25 units per day Indications: type 2 diabetes mellitusHistorical Med      !! insulin glargine (LANTUS) 100 UNIT/ML injection vial Inject 35 Units into the skinHistorical Med      insulin lispro, 1 Unit Dial, 100 UNIT/ML SOPN Inject into the skinHistorical Med      lurasidone (LATUDA) 120 MG tablet Take 40 mg by mouthHistorical Med      sildenafil (VIAGRA) 100 MG tablet Take 100 mg by mouth as neededHistorical Med      zolpidem (AMBIEN) 5 MG tablet Take 5 mg by mouth. Historical Med       !! - Potential duplicate medications found. Please discuss with provider. Vitals signs and nursing note reviewed. No data found. Physical Exam  Vitals and nursing note reviewed. Constitutional:       General: He is not in acute distress. Appearance: Normal appearance. He is obese. He is not ill-appearing, toxic-appearing or diaphoretic. HENT:      Head: Normocephalic and atraumatic. Eyes:      General: No scleral icterus. Extraocular Movements: Extraocular movements intact. Conjunctiva/sclera: Conjunctivae normal.   Cardiovascular:      Rate and Rhythm: Normal rate. Pulses: Normal pulses. Heart sounds: Normal heart sounds. Comments: 2+ PT and DP pulses on right side  Pulmonary:      Effort: Pulmonary effort is normal.      Breath sounds: Normal breath sounds. Abdominal:      General: Bowel sounds are normal.      Palpations: Abdomen is soft. Tenderness: There is no abdominal tenderness. Musculoskeletal:         General: Normal range of motion. Cervical back: Normal range of motion. Right lower leg: Edema (that extends into right foot) present. Comments: Rt Great toe has been amputated   Skin:     General: Skin is warm and dry. Capillary Refill: Capillary refill takes less than 2 seconds. Coloration: Skin is not pale. Findings: No erythema (No erythema of right foot) or lesion. Comments: No ulcers, no drainage, no warmth to right foot   Neurological:      General: No focal deficit present. Mental Status: He is alert and oriented to person, place, and time. Sensory: No sensory deficit (Sensation intact in right foot).    Psychiatric: Mood and Affect: Mood normal.         Behavior: Behavior normal.         Thought Content: Thought content normal.         Judgment: Judgment normal.        Procedures      Results for orders placed or performed during the hospital encounter of 10/10/22   XR FOOT RIGHT (MIN 3 VIEWS)    Narrative    Right foot    CLINICAL INDICATION: Right foot swelling. FINDINGS: Three views of the right foot submitted. There is generalized soft  tissue swelling diffusely about the right foot. The great toe has been  amputated. The fifth metatarsal has been amputated. There is chronic appearing  deformation at the second and third metatarsal heads more in keeping with  chronic osteomyelitis. No definite lytic, destructive bone changes appreciated  to more strongly indicate acute osteomyelitis by plain radiography. Impression    1. Chronic deformity at the second and third metatarsal heads most likely  related to chronic osteomyelitis. 2. No definite destructive changes present to indicate acute osteomyelitis. 3. Nonspecific generalized soft tissue swelling.      CBC with Auto Differential   Result Value Ref Range    WBC 7.4 4.3 - 11.1 K/uL    RBC 5.42 4.23 - 5.60 M/uL    Hemoglobin 13.5 (L) 13.6 - 17.2 g/dL    Hematocrit 41.2 41.1 - 50.3 %    MCV 76.0 (L) 79.6 - 97.8 FL    MCH 24.9 (L) 26.1 - 32.9 PG    MCHC 32.8 31.4 - 35.0 g/dL    RDW 14.4 11.9 - 14.6 %    Platelets 638 800 - 697 K/uL    MPV 9.7 9.4 - 12.3 FL    nRBC 0.00 0.0 - 0.2 K/uL    Differential Type AUTOMATED      Seg Neutrophils 70 43 - 78 %    Lymphocytes 20 13 - 44 %    Monocytes 6 4.0 - 12.0 %    Eosinophils % 3 0.5 - 7.8 %    Basophils 1 0.0 - 2.0 %    Immature Granulocytes 0 0.0 - 5.0 %    Segs Absolute 5.2 1.7 - 8.2 K/UL    Absolute Lymph # 1.5 0.5 - 4.6 K/UL    Absolute Mono # 0.4 0.1 - 1.3 K/UL    Absolute Eos # 0.3 0.0 - 0.8 K/UL    Basophils Absolute 0.1 0.0 - 0.2 K/UL    Absolute Immature Granulocyte 0.0 0.0 - 0.5 K/UL   Procalcitonin   Result Value Ref Range    Procalcitonin <0.05 0.00 - 0.49 ng/mL   Lactic Acid   Result Value Ref Range    Lactic Acid 1.70 0.4 - 2.0 mmol/L   Comprehensive Metabolic Panel   Result Value Ref Range    Sodium 138 138 - 145 mmol/L    Potassium 4.5 3.5 - 5.1 mmol/L    Chloride 100 98 - 107 mmol/L    CO2 25 21 - 32 mmol/L    Anion Gap 13 7.0 - 16.0 mmol/L    Glucose 266 (H) 65 - 100 mg/dL    BUN 18 7.0 - 18.0 MG/DL    Creatinine 1.40 0.8 - 1.5 MG/DL    Est, Glom Filt Rate >60 >60 ml/min/1.73m2    Calcium 8.9 8.3 - 10.4 MG/DL    Total Bilirubin 0.3 0.2 - 1.1 MG/DL    ALT 46 13.0 - 61.0 U/L    AST 24 15 - 37 U/L    Alk Phosphatase 186 (H) 45.0 - 117.0 U/L    Total Protein 7.5 6.4 - 8.2 g/dL    Albumin 3.8 3.5 - 5.0 g/dL    Globulin 3.7 (H) 2.3 - 3.5 g/dL    Albumin/Globulin Ratio 1.0          XR FOOT RIGHT (MIN 3 VIEWS)   Final Result   1. Chronic deformity at the second and third metatarsal heads most likely   related to chronic osteomyelitis. 2. No definite destructive changes present to indicate acute osteomyelitis. 3. Nonspecific generalized soft tissue swelling. Voice dictation software was used during the making of this note. This software is not perfect and grammatical and other typographical errors may be present. This note has not been completely proofread for errors.       Lucien Vernon  10/10/22 [id number]

## 2022-10-10 NOTE — Clinical Note
Freddie Lucas was seen and treated in our emergency department on 10/10/2022. He may return to work on 10/11/2022. If you have any questions or concerns, please don't hesitate to call.       DAPHNE Aguilar

## 2022-10-10 NOTE — ED TRIAGE NOTES
Pt states he had right great toe removed last year for staph infection, states has been swollen since that surgery a year ago but states pain ahs been worse recently. States he has multiple blood clots in right leg and was told swelling is due to blood clots in leg, on blood thinner for this. Denies recently injuring foot or fevers. No redness noted to foot.

## 2022-10-14 ENCOUNTER — HOSPITAL ENCOUNTER (EMERGENCY)
Age: 37
Discharge: HOME | End: 2022-10-14
Attending: EMERGENCY MEDICINE
Payer: COMMERCIAL

## 2022-10-14 VITALS
OXYGEN SATURATION: 95 % | DIASTOLIC BLOOD PRESSURE: 92 MMHG | TEMPERATURE: 97.6 F | BODY MASS INDEX: 39.28 KG/M2 | RESPIRATION RATE: 18 BRPM | WEIGHT: 290 LBS | HEIGHT: 72 IN | HEART RATE: 84 BPM | SYSTOLIC BLOOD PRESSURE: 139 MMHG

## 2022-10-14 DIAGNOSIS — M79.604 RIGHT LEG PAIN: Primary | ICD-10-CM

## 2022-10-14 PROCEDURE — 99283 EMERGENCY DEPT VISIT LOW MDM: CPT

## 2022-10-14 RX ORDER — OXYCODONE HYDROCHLORIDE 5 MG/1
5 TABLET ORAL EVERY 6 HOURS PRN
Qty: 12 TABLET | Refills: 0 | Status: SHIPPED | OUTPATIENT
Start: 2022-10-14 | End: 2022-10-17

## 2022-10-14 ASSESSMENT — PAIN SCALES - GENERAL: PAINLEVEL_OUTOF10: 10

## 2022-10-14 ASSESSMENT — ENCOUNTER SYMPTOMS: SHORTNESS OF BREATH: 0

## 2022-10-14 ASSESSMENT — PAIN - FUNCTIONAL ASSESSMENT: PAIN_FUNCTIONAL_ASSESSMENT: 0-10

## 2022-10-14 NOTE — ED NOTES
I have reviewed discharge instructions with the patient. The patient verbalized understanding. Patient left ED via Discharge Method: ambulatory to Home with self. Opportunity for questions and clarification provided. Patient given 1 scripts. To continue your aftercare when you leave the hospital, you may receive an automated call from our care team to check in on how you are doing. This is a free service and part of our promise to provide the best care and service to meet your aftercare needs.  If you have questions, or wish to unsubscribe from this service please call [phone]. Thank you for Choosing our [facility] Emergency Department.         Rachael Mccarthy RN  10/14/22 [id number]
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
## 2022-10-14 NOTE — DISCHARGE INSTRUCTIONS
Take medication as prescribed. Call the vascular office and see if they can see you for follow-up. Unfortunately, for chronic DVT issues we do send patients to the same office that you were being seen at. As we discussed, I would suggest continuing the Xarelto, wearing compression stockings, and elevating your leg when at rest.  Return to the emergency department for any new, worsening, or concerning symptoms.

## 2022-10-14 NOTE — ED TRIAGE NOTES
Pt reports right leg pain that has worsened since Wednesday. Hx of osteomyelitis and multiple blood clots .

## 2022-11-09 ENCOUNTER — OFFICE VISIT (OUTPATIENT)
Dept: UROLOGY | Age: 37
End: 2022-11-09
Payer: COMMERCIAL

## 2022-11-09 DIAGNOSIS — E13.69 OTHER SPECIFIED DIABETES MELLITUS WITH OTHER SPECIFIED COMPLICATION, WITHOUT LONG-TERM CURRENT USE OF INSULIN (HCC): ICD-10-CM

## 2022-11-09 DIAGNOSIS — R68.82 DECREASED LIBIDO: ICD-10-CM

## 2022-11-09 DIAGNOSIS — N52.9 ERECTILE DYSFUNCTION, UNSPECIFIED ERECTILE DYSFUNCTION TYPE: Primary | ICD-10-CM

## 2022-11-09 LAB
BILIRUBIN, URINE, POC: NEGATIVE
BLOOD URINE, POC: NEGATIVE
GLUCOSE URINE, POC: 500
KETONES, URINE, POC: NEGATIVE
LEUKOCYTE ESTERASE, URINE, POC: NEGATIVE
NITRITE, URINE, POC: NEGATIVE
PH, URINE, POC: 5.5 (ref 4.6–8)
PROTEIN,URINE, POC: NORMAL
SPECIFIC GRAVITY, URINE, POC: 1.02 (ref 1–1.03)
URINALYSIS CLARITY, POC: NORMAL
URINALYSIS COLOR, POC: NORMAL
UROBILINOGEN, POC: NORMAL

## 2022-11-09 PROCEDURE — 99214 OFFICE O/P EST MOD 30 MIN: CPT | Performed by: NURSE PRACTITIONER

## 2022-11-09 PROCEDURE — 81003 URINALYSIS AUTO W/O SCOPE: CPT | Performed by: NURSE PRACTITIONER

## 2022-11-09 RX ORDER — TADALAFIL 20 MG/1
20 TABLET ORAL PRN
Qty: 30 TABLET | Refills: 11 | Status: SHIPPED | OUTPATIENT
Start: 2022-11-09

## 2022-11-09 RX ORDER — DESVENLAFAXINE 50 MG/1
TABLET, EXTENDED RELEASE ORAL
COMMUNITY
Start: 2022-10-06

## 2022-11-09 ASSESSMENT — ENCOUNTER SYMPTOMS
NAUSEA: 0
BACK PAIN: 0

## 2022-11-14 ENCOUNTER — HOSPITAL ENCOUNTER (EMERGENCY)
Age: 37
Discharge: HOME | End: 2022-11-14
Attending: EMERGENCY MEDICINE | Admitting: EMERGENCY MEDICINE
Payer: COMMERCIAL

## 2022-11-14 VITALS
OXYGEN SATURATION: 96 % | TEMPERATURE: 98.2 F | HEART RATE: 72 BPM | WEIGHT: 200 LBS | HEIGHT: 72 IN | SYSTOLIC BLOOD PRESSURE: 116 MMHG | BODY MASS INDEX: 27.09 KG/M2 | RESPIRATION RATE: 18 BRPM | DIASTOLIC BLOOD PRESSURE: 76 MMHG

## 2022-11-14 DIAGNOSIS — J02.9 ACUTE PHARYNGITIS, UNSPECIFIED ETIOLOGY: Primary | ICD-10-CM

## 2022-11-14 LAB
FLUAV AG NPH QL IA: NEGATIVE
FLUBV AG NPH QL IA: NEGATIVE
SARS-COV-2 RDRP RESP QL NAA+PROBE: NOT DETECTED
SOURCE: NORMAL
SPECIMEN SOURCE: NORMAL

## 2022-11-14 PROCEDURE — 99283 EMERGENCY DEPT VISIT LOW MDM: CPT

## 2022-11-14 PROCEDURE — 87635 SARS-COV-2 COVID-19 AMP PRB: CPT

## 2022-11-14 PROCEDURE — 87804 INFLUENZA ASSAY W/OPTIC: CPT

## 2022-11-14 RX ORDER — NAPROXEN 500 MG/1
500 TABLET ORAL 2 TIMES DAILY WITH MEALS
Qty: 28 TABLET | Refills: 0 | Status: SHIPPED | OUTPATIENT
Start: 2022-11-14 | End: 2022-11-28

## 2022-11-14 RX ORDER — AMOXICILLIN 500 MG/1
500 CAPSULE ORAL 3 TIMES DAILY
Qty: 30 CAPSULE | Refills: 0 | Status: SHIPPED | OUTPATIENT
Start: 2022-11-14 | End: 2022-11-24

## 2022-11-14 ASSESSMENT — PAIN DESCRIPTION - LOCATION
LOCATION: HEAD
LOCATION: HEAD;GENERALIZED

## 2022-11-14 ASSESSMENT — PAIN DESCRIPTION - PAIN TYPE: TYPE: ACUTE PAIN

## 2022-11-14 ASSESSMENT — PAIN DESCRIPTION - DESCRIPTORS
DESCRIPTORS: THROBBING
DESCRIPTORS: POUNDING

## 2022-11-14 ASSESSMENT — PAIN SCALES - GENERAL
PAINLEVEL_OUTOF10: 7

## 2022-11-14 ASSESSMENT — ENCOUNTER SYMPTOMS
SHORTNESS OF BREATH: 0
DIARRHEA: 0
COUGH: 1
CHEST TIGHTNESS: 0
NAUSEA: 0
EYE DISCHARGE: 0
VOMITING: 0
EYE ITCHING: 0
SORE THROAT: 1

## 2022-11-14 ASSESSMENT — PAIN DESCRIPTION - FREQUENCY: FREQUENCY: CONTINUOUS

## 2022-11-14 ASSESSMENT — PAIN - FUNCTIONAL ASSESSMENT: PAIN_FUNCTIONAL_ASSESSMENT: 0-10

## 2022-11-14 NOTE — ED TRIAGE NOTES
Pt states he has had headache, body aches, fatigue, eye pressure for past day, denies being around anyone sick. Denies fevers. Throat pain as well.

## 2022-11-14 NOTE — ED PROVIDER NOTES
Emergency Department Provider Note                   PCP:                Letitia Booker MD               Age: 42 y.o. Sex: male       ICD-10-CM    1. Acute pharyngitis, unspecified etiology  J02.9           DISPOSITION Decision To Discharge 11/14/2022 09:44:44 AM       MDM  Number of Diagnoses or Management Options  Acute pharyngitis, unspecified etiology: new, needed workup  Diagnosis management comments: No acute findings on work-up today  We will treat the patient for possible bacterial pharyngitis    Advise close follow-up with primary care       Amount and/or Complexity of Data Reviewed  Clinical lab tests: ordered and reviewed  Tests in the medicine section of CPT®: reviewed  Decide to obtain previous medical records or to obtain history from someone other than the patient: yes  Review and summarize past medical records: yes  Independent visualization of images, tracings, or specimens: yes    Risk of Complications, Morbidity, and/or Mortality  Presenting problems: moderate  Diagnostic procedures: moderate  Management options: moderate  General comments: Elements of this note have been dictated via voice recognition software. Text and phrases may be limited by the accuracy of the software. The chart has been reviewed, but errors may still be present.         Patient Progress  Patient progress: stable             Orders Placed This Encounter   Procedures    COVID-19, Rapid    Rapid influenza A/B antigens        Medications - No data to display    Discharge Medication List as of 11/14/2022  9:47 AM        START taking these medications    Details   amoxicillin (AMOXIL) 500 MG capsule Take 1 capsule by mouth 3 times daily for 10 days, Disp-30 capsule, R-0Print      naproxen (NAPROSYN) 500 MG tablet Take 1 tablet by mouth 2 times daily (with meals) for 14 days, Disp-28 tablet, R-0Print              Papi Guaman is a 42 y.o. male who presents to the Emergency Department with chief complaint of    Chief Complaint   Patient presents with    Generalized Body Aches    Headache      27-year-old male patient presents to the ER with a 1 day history of fever myalgias left earache sore throat  No ill contacts  No vomiting or diarrhea    The history is provided by the patient. Generalized Body Aches  Severity:  Moderate  Onset quality:  Gradual  Duration:  1 day  Timing:  Constant  Progression:  Worsening  Chronicity:  New  Associated symptoms: cough, fatigue, fever, headaches, myalgias and sore throat    Associated symptoms: no chest pain, no diarrhea, no nausea, no rash, no shortness of breath and no vomiting    Headache  Pain location:  Generalized  Quality:  Dull  Onset quality:  Gradual  Timing:  Constant  Progression:  Waxing and waning  Chronicity:  New  Context: coughing    Associated symptoms: cough, fatigue, fever, myalgias and sore throat    Associated symptoms: no diarrhea, no hearing loss, no nausea, no seizures and no vomiting      All other systems reviewed and are negative unless otherwise stated in the history of present illness section. Review of Systems   Constitutional:  Positive for fatigue and fever. Negative for chills. HENT:  Positive for sore throat. Negative for hearing loss, sneezing and tinnitus. Eyes:  Negative for discharge and itching. Respiratory:  Positive for cough. Negative for chest tightness and shortness of breath. Cardiovascular:  Negative for chest pain and palpitations. Gastrointestinal:  Negative for diarrhea, nausea and vomiting. Endocrine: Negative for cold intolerance, heat intolerance, polydipsia, polyphagia and polyuria. Genitourinary:  Negative for dysuria, hematuria and urgency. Musculoskeletal:  Positive for myalgias. Negative for arthralgias. Skin:  Negative for rash and wound. Allergic/Immunologic: Negative for immunocompromised state. Neurological:  Positive for headaches. Negative for seizures and syncope. Hematological: Negative. Psychiatric/Behavioral:  Negative for dysphoric mood and self-injury. The patient is not nervous/anxious. All other systems reviewed and are negative.     Past Medical History:   Diagnosis Date    Arrhythmia     \" artery in heart goes into spams\"  no meds    Depression with anxiety 4/11/2017    Diabetes ([facility] [address]     type ll age 25, avgfbs 65, last A1C was 15 ,     Essential hypertension 9/2/2018    Hypercholesterolemia     Morbid obesity ([facility] [address]     bmi 15    Nausea & vomiting     Renal stone     Stroke [facility])         Past Surgical History:   Procedure Laterality Date    ADENOIDECTOMY  age13    [facility] SURGERY  2015    right shoulder repair    ORTHOPEDIC SURGERY      left knee x 3  scopes    TOE AMPUTATION      TONSILLECTOMY  age 10        Family History   Problem Relation Age of Onset    Kidney Disease Mother     Diabetes Father     Kidney Disease Father         Social History     Socioeconomic History    Marital status:      Spouse name: None    Number of children: None    Years of education: None    Highest education level: None   Tobacco Use    Smoking status: Never    Smokeless tobacco: Never   Substance and Sexual Activity    Alcohol use: Yes    Drug use: No        Allergies: Promethazine and Metformin    Discharge Medication List as of 11/14/2022  9:47 AM        CONTINUE these medications which have NOT CHANGED    Details   desvenlafaxine succinate (PRISTIQ) 50 MG TB24 extended release tablet Historical Med      tadalafil (CIALIS) 20 MG tablet Take 1 tablet by mouth as needed for Erectile Dysfunction, Disp-30 tablet, R-11Print      rivaroxaban (XARELTO) 20 MG TABS tablet Take 20 mg by mouthHistorical Med      Lancets MISC 3 TIMES DAILY Starting Tue 8/18/2020, Historical Med      atomoxetine (STRATTERA) 40 MG capsule Take 40 mg by mouth dailyHistorical Med      busPIRone (BUSPAR) 10 MG tablet Take 10 mg by mouth 3 times dailyHistorical Med      glipiZIDE (GLUCOTROL) 10 MG tablet Take 10 mg by mouth 2 times dailyHistorical Med      !! insulin glargine (LANTUS;BASAGLAR) 100 UNIT/ML injection pen 25 units per day Indications: type 2 diabetes mellitusHistorical Med      !! insulin glargine (LANTUS) 100 UNIT/ML injection vial Inject 35 Units into the skinHistorical Med      insulin lispro, 1 Unit Dial, 100 UNIT/ML SOPN Inject into the skinHistorical Med      lurasidone (LATUDA) 120 MG tablet Take 40 mg by mouthHistorical Med      sildenafil (VIAGRA) 100 MG tablet Take 100 mg by mouth as neededHistorical Med      zolpidem (AMBIEN) 5 MG tablet Take 5 mg by mouth. Historical Med       !! - Potential duplicate medications found. Please discuss with provider. Vitals signs and nursing note reviewed. Patient Vitals for the past 4 hrs:   Pulse Resp BP SpO2   11/14/22 0951 72 18 116/76 96 %   11/14/22 0930 -- -- 119/79 97 %          Physical Exam  Vitals and nursing note reviewed. Constitutional:       General: He is in acute distress. Appearance: Normal appearance. He is normal weight. HENT:      Head: Normocephalic and atraumatic. Right Ear: External ear normal.      Left Ear: External ear normal.      Nose: Nose normal.      Mouth/Throat:      Mouth: Mucous membranes are moist.      Pharynx: Oropharynx is clear. Posterior oropharyngeal erythema present. No oropharyngeal exudate. Eyes:      General: No scleral icterus. Extraocular Movements: Extraocular movements intact. Conjunctiva/sclera: Conjunctivae normal.      Pupils: Pupils are equal, round, and reactive to light. Cardiovascular:      Rate and Rhythm: Normal rate and regular rhythm. Pulses: Normal pulses. Heart sounds: Normal heart sounds. Pulmonary:      Effort: Pulmonary effort is normal. No respiratory distress. Breath sounds: Normal breath sounds. Abdominal:      General: Abdomen is flat. Bowel sounds are normal. There is no distension. Palpations: Abdomen is soft.  There is no mass.      Tenderness: There is no abdominal tenderness. Musculoskeletal:         General: No deformity or signs of injury. Normal range of motion. Cervical back: Normal range of motion and neck supple. Skin:     General: Skin is warm and dry. Capillary Refill: Capillary refill takes less than 2 seconds. Neurological:      General: No focal deficit present. Mental Status: He is alert and oriented to person, place, and time. Psychiatric:         Mood and Affect: Mood normal.         Behavior: Behavior normal.         Thought Content: Thought content normal.         Judgment: Judgment normal.        Procedures    ED EKG Interpretation  EKG was interpreted in the absence of a cardiologist.        Results for orders placed or performed during the hospital encounter of 11/14/22   COVID-19, Rapid    Specimen: Nasopharyngeal   Result Value Ref Range    Source Nasopharyngeal      SARS-CoV-2, Rapid Not detected NOTD     Rapid influenza A/B antigens    Specimen: Nasal Washing   Result Value Ref Range    Influenza A Ag Negative NEG      Influenza B Ag Negative NEG      Source Nasopharyngeal          No orders to display                         Voice dictation software was used during the making of this note. This software is not perfect and grammatical and other typographical errors may be present. This note has not been completely proofread for errors.      Tung Rey MD  11/14/22 [id number]

## 2022-11-14 NOTE — ED NOTES
I have reviewed discharge instructions with the patient. The patient verbalized understanding. Patient left ED via Discharge Method: ambulatory to Home with self    Opportunity for questions and clarification provided. Patient given 2 scripts. To continue your aftercare when you leave the hospital, you may receive an automated call from our care team to check in on how you are doing. This is a free service and part of our promise to provide the best care and service to meet your aftercare needs.  If you have questions, or wish to unsubscribe from this service please call [phone]. Thank you for Choosing our [facility] Emergency Department.         Amol Ba RN  11/14/22 7973

## 2022-11-14 NOTE — DISCHARGE INSTRUCTIONS
You must finish all the prescribed antibiotics.   THERE SHOULD BE NO LEFT OVER PILLS!!  Call your doctor or the follow up doctor to set up appointment for recheck visit  Push fluids  Monitor for fever spikes    Return to ER for any worsening symptoms or new problems which may arise

## 2022-11-18 DIAGNOSIS — N52.9 ERECTILE DYSFUNCTION, UNSPECIFIED ERECTILE DYSFUNCTION TYPE: ICD-10-CM

## 2023-02-20 ENCOUNTER — HOSPITAL ENCOUNTER (EMERGENCY)
Age: 38
Discharge: HOME | End: 2023-02-20
Attending: EMERGENCY MEDICINE
Payer: COMMERCIAL

## 2023-02-20 VITALS
TEMPERATURE: 98.4 F | RESPIRATION RATE: 18 BRPM | DIASTOLIC BLOOD PRESSURE: 95 MMHG | SYSTOLIC BLOOD PRESSURE: 145 MMHG | OXYGEN SATURATION: 93 % | HEART RATE: 88 BPM

## 2023-02-20 DIAGNOSIS — J10.1 INFLUENZA A: Primary | ICD-10-CM

## 2023-02-20 LAB
FLUAV RNA SPEC QL NAA+PROBE: DETECTED
FLUBV RNA SPEC QL NAA+PROBE: NOT DETECTED
SARS-COV-2 RDRP RESP QL NAA+PROBE: NOT DETECTED
SOURCE: NORMAL

## 2023-02-20 PROCEDURE — 87502 INFLUENZA DNA AMP PROBE: CPT

## 2023-02-20 PROCEDURE — 6360000002 HC RX W HCPCS: Performed by: NURSE PRACTITIONER

## 2023-02-20 PROCEDURE — 99283 EMERGENCY DEPT VISIT LOW MDM: CPT

## 2023-02-20 PROCEDURE — 6370000000 HC RX 637 (ALT 250 FOR IP): Performed by: NURSE PRACTITIONER

## 2023-02-20 PROCEDURE — 87635 SARS-COV-2 COVID-19 AMP PRB: CPT

## 2023-02-20 RX ORDER — ONDANSETRON 4 MG/1
4 TABLET, ORALLY DISINTEGRATING ORAL
Status: COMPLETED | OUTPATIENT
Start: 2023-02-20 | End: 2023-02-20

## 2023-02-20 RX ORDER — DEXAMETHASONE SODIUM PHOSPHATE 10 MG/ML
10 INJECTION, SOLUTION INTRAMUSCULAR; INTRAVENOUS ONCE
Status: COMPLETED | OUTPATIENT
Start: 2023-02-20 | End: 2023-02-20

## 2023-02-20 RX ORDER — BENZONATATE 200 MG/1
200 CAPSULE ORAL 3 TIMES DAILY PRN
Qty: 30 CAPSULE | Refills: 0 | Status: ON HOLD | OUTPATIENT
Start: 2023-02-20 | End: 2023-02-24 | Stop reason: HOSPADM

## 2023-02-20 RX ORDER — BENZONATATE 100 MG/1
200 CAPSULE ORAL
Status: COMPLETED | OUTPATIENT
Start: 2023-02-20 | End: 2023-02-20

## 2023-02-20 RX ORDER — ONDANSETRON 4 MG/1
4 TABLET, ORALLY DISINTEGRATING ORAL 3 TIMES DAILY PRN
Qty: 21 TABLET | Refills: 0 | Status: SHIPPED | OUTPATIENT
Start: 2023-02-20

## 2023-02-20 RX ADMIN — DEXAMETHASONE SODIUM PHOSPHATE 10 MG: 10 INJECTION, SOLUTION INTRAMUSCULAR; INTRAVENOUS at 09:19

## 2023-02-20 RX ADMIN — ONDANSETRON 4 MG: 4 TABLET, ORALLY DISINTEGRATING ORAL at 09:19

## 2023-02-20 RX ADMIN — BENZONATATE 200 MG: 100 CAPSULE ORAL at 09:19

## 2023-02-20 ASSESSMENT — ENCOUNTER SYMPTOMS
SORE THROAT: 1
ABDOMINAL PAIN: 0
VOMITING: 1
COUGH: 1
SHORTNESS OF BREATH: 0
NAUSEA: 1
DIARRHEA: 0

## 2023-02-20 ASSESSMENT — PAIN - FUNCTIONAL ASSESSMENT: PAIN_FUNCTIONAL_ASSESSMENT: 0-10

## 2023-02-20 ASSESSMENT — PAIN SCALES - GENERAL: PAINLEVEL_OUTOF10: 8

## 2023-02-20 NOTE — ED PROVIDER NOTES
Emergency Department Provider Note                   PCP:                Angi Renner MD               Age: 42 y.o. Sex: male       ICD-10-CM    1. Influenza A  J10.1           DISPOSITION Decision To Discharge 02/20/2023 09:36:51 AM        Medical Decision Making  38-year-old male presents emergency department today with complain of cold symptoms since yesterday. Patient appears in no acute distress. Lung sounds are clear throughout. He is afebrile but did take Tylenol this morning. SPO2 is 98% at time of exam on room air. Symptoms consistent with viral etiology. Will check for COVID and flu today. Will treat symptomatically at this time. Positive for influenza A. Results discussed with the patient. Supportive treatment discussed and encouraged. Red flag symptoms and return precautions discussed. Work note provided. Risk  Prescription drug management. Complexity of Problem: 1 acute, uncomplicated illness or injury. (3)    I have conducted an independent ordering and review of Labs. I have reviewed records from an external source: ED records from outside this hospital.  I have reviewed records from an external source: provider visit notes from PCP. Considerations: Shared decision making was utilized in the care of this patient.                   Orders Placed This Encounter   Procedures    COVID-19, Rapid    Influenza A/B, Molecular        Medications   ondansetron (ZOFRAN-ODT) disintegrating tablet 4 mg (4 mg Oral Given 2/20/23 0919)   benzonatate (TESSALON) capsule 200 mg (200 mg Oral Given 2/20/23 0919)   dexamethasone (DECADRON) Oral 10 mg (10 mg Oral Given 2/20/23 0919)       Discharge Medication List as of 2/20/2023  9:46 AM        START taking these medications    Details   benzonatate (TESSALON) 200 MG capsule Take 1 capsule by mouth 3 times daily as needed for Cough, Disp-30 capsule, R-0Normal      ondansetron (ZOFRAN-ODT) 4 MG disintegrating tablet Take 1 tablet by mouth 3 times daily as needed for Nausea or Vomiting, Disp-21 tablet, R-0Normal              Ish Bryant is a 42 y.o. male who presents to the Emergency Department with chief complaint of    Chief Complaint   Patient presents with    Generalized Body Aches    Pharyngitis      68-year-old male with a history of diabetes who presents emergency department today with complaint of cough, sore throat, fever, body aches, and vomiting. He states symptoms began yesterday. Spouses sick with similar symptoms. He reports last vomiting at around 3 AM.  He denies any chest pain, abdominal pain, or shortness of breath. He took some Tylenol that helped with the fever but denies treatment for other symptoms. The history is provided by the patient. Cough  Cough characteristics:  Non-productive  Severity:  Moderate  Onset quality:  Gradual  Duration:  1 day  Timing:  Intermittent  Progression:  Unchanged  Chronicity:  New  Smoker: no    Relieved by:  None tried  Worsened by:  Nothing  Ineffective treatments:  None tried  Associated symptoms: chills, ear pain, fever and sore throat    Associated symptoms: no chest pain and no shortness of breath        Review of Systems   Constitutional:  Positive for chills and fever. HENT:  Positive for ear pain and sore throat. Respiratory:  Positive for cough. Negative for shortness of breath. Cardiovascular:  Negative for chest pain. Gastrointestinal:  Positive for nausea and vomiting. Negative for abdominal pain and diarrhea. All other systems reviewed and are negative.     Past Medical History:   Diagnosis Date    Arrhythmia     \" artery in heart goes into spams\"  no meds    Depression with anxiety 4/11/2017    Diabetes ([facility] [address]     type ll age 25, avgfbs 65, last A1C was 15 ,     Essential hypertension 9/2/2018    Hypercholesterolemia     Morbid obesity ([address]     bmi 15    Nausea & vomiting     Renal stone     Stroke [facility])         Past Surgical History:   Procedure Laterality Date [id number]  2015    right shoulder repair    ORTHOPEDIC SURGERY      left knee x 3  scopes    TOE AMPUTATION      TONSILLECTOMY  age 10        Family History   Problem Relation Age of Onset    Kidney Disease Mother     Diabetes Father     Kidney Disease Father         Social History     Socioeconomic History    Marital status:      Spouse name: None    Number of children: None    Years of education: None    Highest education level: None   Tobacco Use    Smoking status: Never    Smokeless tobacco: Never   Substance and Sexual Activity    Alcohol use: Yes    Drug use: No         Promethazine and Metformin     Discharge Medication List as of 2/20/2023  9:46 AM        CONTINUE these medications which have NOT CHANGED    Details   naproxen (NAPROSYN) 500 MG tablet Take 1 tablet by mouth 2 times daily (with meals) for 14 days, Disp-28 tablet, R-0Print      desvenlafaxine succinate (PRISTIQ) 50 MG TB24 extended release tablet Historical Med      tadalafil (CIALIS) 20 MG tablet Take 1 tablet by mouth as needed for Erectile Dysfunction, Disp-30 tablet, R-11Print      rivaroxaban (XARELTO) 20 MG TABS tablet Take 20 mg by mouthHistorical Med      Lancets MISC 3 TIMES DAILY Starting Tue 8/18/2020, Historical Med      atomoxetine (STRATTERA) 40 MG capsule Take 40 mg by mouth dailyHistorical Med      busPIRone (BUSPAR) 10 MG tablet Take 10 mg by mouth 3 times dailyHistorical Med      glipiZIDE (GLUCOTROL) 10 MG tablet Take 10 mg by mouth 2 times dailyHistorical Med      !! insulin glargine (LANTUS;BASAGLAR) 100 UNIT/ML injection pen 25 units per day Indications: type 2 diabetes mellitusHistorical Med      !! insulin glargine (LANTUS) 100 UNIT/ML injection vial Inject 35 Units into the skinHistorical Med      insulin lispro, 1 Unit Dial, 100 UNIT/ML SOPN Inject into the skinHistorical Med      lurasidone (LATUDA) 120 MG tablet Take 40 mg by mouthHistorical Med sildenafil (VIAGRA) 100 MG tablet Take 100 mg by mouth as neededHistorical Med      zolpidem (AMBIEN) 5 MG tablet Take 5 mg by mouth. Historical Med       !! - Potential duplicate medications found. Please discuss with provider. Vitals signs and nursing note reviewed. Patient Vitals for the past 4 hrs:   Temp Pulse Resp BP SpO2   02/20/23 0915 -- -- -- (!) 145/95 93 %   02/20/23 0905 98.4 °F (36.9 °C) 88 18 (!) 138/102 96 %          Physical Exam  Vitals and nursing note reviewed. Constitutional:       General: He is not in acute distress. Appearance: Normal appearance. He is well-developed. He is not ill-appearing, toxic-appearing or diaphoretic. HENT:      Head: Normocephalic and atraumatic. Right Ear: External ear normal. A middle ear effusion is present. Tympanic membrane is not erythematous. Left Ear: External ear normal. A middle ear effusion is present. Tympanic membrane is not erythematous. Nose: Nose normal.      Mouth/Throat:      Pharynx: Uvula midline. Posterior oropharyngeal erythema present. No pharyngeal swelling or uvula swelling. Tonsils: No tonsillar exudate or tonsillar abscesses. Eyes:      Extraocular Movements: Extraocular movements intact. Conjunctiva/sclera: Conjunctivae normal.   Cardiovascular:      Rate and Rhythm: Normal rate. Heart sounds: Normal heart sounds. Pulmonary:      Effort: Pulmonary effort is normal. No respiratory distress. Breath sounds: Normal breath sounds. Abdominal:      General: Abdomen is flat. There is no distension. Tenderness: There is no abdominal tenderness. Musculoskeletal:         General: Normal range of motion. Cervical back: Normal range of motion. Skin:     General: Skin is warm and dry. Neurological:      General: No focal deficit present. Mental Status: He is alert and oriented to person, place, and time.    Psychiatric:         Mood and Affect: Mood normal.         Behavior: Behavior normal.         Thought Content: Thought content normal.         Judgment: Judgment normal.        Procedures    Results for orders placed or performed during the hospital encounter of 02/20/23   COVID-19, Rapid    Specimen: Nasopharyngeal   Result Value Ref Range    Source Nasopharyngeal      SARS-CoV-2, Rapid Not detected NOTD     Influenza A/B, Molecular    Specimen: Nasal   Result Value Ref Range    Influenza A, SHIRA Detected      Influenza B, SHIRA Not detected          No orders to display                       Voice dictation software was used during the making of this note. This software is not perfect and grammatical and other typographical errors may be present. This note has not been completely proofread for errors.        ZULLY Ortiz - [place]  02/20/23 0801

## 2023-02-20 NOTE — Clinical Note
Zoila Auguste was seen and treated in our emergency department on 2/20/2023. He may return to work on 02/27/2023. If you have any questions or concerns, please don't hesitate to call.       Kamran Cheek, APRN - CNP

## 2023-02-20 NOTE — DISCHARGE INSTRUCTIONS
Your flu test is positive today. As we discussed this is viral and will take some time to begin feeling better. Take Tylenol or Motrin if needed for fever, body aches, or headaches. Make sure you are staying well-hydrated. Take prescribed medications as if needed. Return to the emergency department for any new, worsening, or concerning symptoms.

## 2023-02-20 NOTE — ED TRIAGE NOTES
Pt reports that he started feeling bad yesterday, reports fever, body aches, sore throat, chills, and cough, pt also reports eye pain as well.

## 2023-02-22 ENCOUNTER — HOSPITAL ENCOUNTER (EMERGENCY)
Age: 38
Discharge: ANOTHER HOSPITAL | End: 2023-02-22
Attending: EMERGENCY MEDICINE
Payer: COMMERCIAL

## 2023-02-22 ENCOUNTER — APPOINTMENT (OUTPATIENT)
Dept: GENERAL RADIOLOGY | Age: 38
End: 2023-02-22
Payer: COMMERCIAL

## 2023-02-22 ENCOUNTER — HOSPITAL ENCOUNTER (INPATIENT)
Age: 38
LOS: 2 days | Discharge: HOME | End: 2023-02-24
Attending: FAMILY MEDICINE | Admitting: FAMILY MEDICINE
Payer: COMMERCIAL

## 2023-02-22 VITALS
TEMPERATURE: 98.3 F | BODY MASS INDEX: 40.63 KG/M2 | OXYGEN SATURATION: 93 % | RESPIRATION RATE: 16 BRPM | DIASTOLIC BLOOD PRESSURE: 70 MMHG | HEIGHT: 72 IN | SYSTOLIC BLOOD PRESSURE: 121 MMHG | WEIGHT: 300 LBS | HEART RATE: 75 BPM

## 2023-02-22 DIAGNOSIS — E11.65 TYPE 2 DIABETES MELLITUS WITH HYPERGLYCEMIA, UNSPECIFIED WHETHER LONG TERM INSULIN USE (HCC): ICD-10-CM

## 2023-02-22 DIAGNOSIS — R09.02 HYPOXIA: ICD-10-CM

## 2023-02-22 DIAGNOSIS — J10.1 INFLUENZA A: Primary | ICD-10-CM

## 2023-02-22 PROBLEM — I82.409 DEEP VENOUS THROMBOSIS (HCC): Status: ACTIVE | Noted: 2023-02-22

## 2023-02-22 PROBLEM — F41.8 DEPRESSION WITH ANXIETY: Status: ACTIVE | Noted: 2017-04-11

## 2023-02-22 PROBLEM — I10 ESSENTIAL HYPERTENSION: Status: ACTIVE | Noted: 2018-09-02

## 2023-02-22 PROBLEM — E66.01 SEVERE OBESITY (HCC): Status: ACTIVE | Noted: 2020-01-31

## 2023-02-22 LAB
ALBUMIN SERPL-MCNC: 4 G/DL (ref 3.5–5)
ALBUMIN/GLOB SERPL: 1.1 (ref 0.4–1.6)
ALP SERPL-CCNC: 154 U/L (ref 45–117)
ALT SERPL-CCNC: 31 U/L (ref 13–61)
ANION GAP SERPL CALC-SCNC: 12 MMOL/L (ref 2–11)
AST SERPL-CCNC: 17 U/L (ref 15–37)
BASOPHILS # BLD: 0.1 K/UL (ref 0–0.2)
BASOPHILS NFR BLD: 1 % (ref 0–2)
BILIRUB SERPL-MCNC: 0.2 MG/DL (ref 0.2–1.1)
BUN SERPL-MCNC: 21 MG/DL (ref 7–18)
CALCIUM SERPL-MCNC: 9 MG/DL (ref 8.3–10.4)
CHLORIDE SERPL-SCNC: 92 MMOL/L (ref 98–107)
CO2 SERPL-SCNC: 27 MMOL/L (ref 21–32)
CREAT SERPL-MCNC: 1.06 MG/DL (ref 0.8–1.5)
DIFFERENTIAL METHOD BLD: ABNORMAL
EOSINOPHIL # BLD: 0.1 K/UL (ref 0–0.8)
EOSINOPHIL NFR BLD: 3 % (ref 0.5–7.8)
ERYTHROCYTE [DISTWIDTH] IN BLOOD BY AUTOMATED COUNT: 13.9 % (ref 11.9–14.6)
GLOBULIN SER CALC-MCNC: 3.6 G/DL (ref 2.8–4.5)
GLUCOSE BLD STRIP.AUTO-MCNC: 310 MG/DL (ref 65–100)
GLUCOSE SERPL-MCNC: 374 MG/DL (ref 65–100)
HCT VFR BLD AUTO: 42.1 % (ref 41.1–50.3)
HGB BLD-MCNC: 13.8 G/DL (ref 13.6–17.2)
IMM GRANULOCYTES # BLD AUTO: 0 K/UL (ref 0–0.5)
IMM GRANULOCYTES NFR BLD AUTO: 1 % (ref 0–5)
LYMPHOCYTES # BLD: 1.1 K/UL (ref 0.5–4.6)
LYMPHOCYTES NFR BLD: 20 % (ref 13–44)
MCH RBC QN AUTO: 25 PG (ref 26.1–32.9)
MCHC RBC AUTO-ENTMCNC: 32.8 G/DL (ref 31.4–35)
MCV RBC AUTO: 76.4 FL (ref 82–102)
MONOCYTES # BLD: 0.5 K/UL (ref 0.1–1.3)
MONOCYTES NFR BLD: 9 % (ref 4–12)
NEUTS SEG # BLD: 3.8 K/UL (ref 1.7–8.2)
NEUTS SEG NFR BLD: 67 % (ref 43–78)
NRBC # BLD: 0 K/UL (ref 0–0.2)
PLATELET # BLD AUTO: 260 K/UL (ref 150–450)
PMV BLD AUTO: 9.1 FL (ref 9.4–12.3)
POTASSIUM SERPL-SCNC: 4.4 MMOL/L (ref 3.5–5.1)
PROT SERPL-MCNC: 7.6 G/DL (ref 6.4–8.2)
RBC # BLD AUTO: 5.51 M/UL (ref 4.23–5.6)
SERVICE CMNT-IMP: ABNORMAL
SODIUM SERPL-SCNC: 131 MMOL/L (ref 133–143)
WBC # BLD AUTO: 5.6 K/UL (ref 4.3–11.1)

## 2023-02-22 PROCEDURE — 85025 COMPLETE CBC W/AUTO DIFF WBC: CPT

## 2023-02-22 PROCEDURE — 6370000000 HC RX 637 (ALT 250 FOR IP): Performed by: FAMILY MEDICINE

## 2023-02-22 PROCEDURE — 99285 EMERGENCY DEPT VISIT HI MDM: CPT

## 2023-02-22 PROCEDURE — 82962 GLUCOSE BLOOD TEST: CPT

## 2023-02-22 PROCEDURE — 6370000000 HC RX 637 (ALT 250 FOR IP): Performed by: PHYSICIAN ASSISTANT

## 2023-02-22 PROCEDURE — 96375 TX/PRO/DX INJ NEW DRUG ADDON: CPT

## 2023-02-22 PROCEDURE — 71045 X-RAY EXAM CHEST 1 VIEW: CPT

## 2023-02-22 PROCEDURE — 80053 COMPREHEN METABOLIC PANEL: CPT

## 2023-02-22 PROCEDURE — 2580000003 HC RX 258: Performed by: PHYSICIAN ASSISTANT

## 2023-02-22 PROCEDURE — 6360000002 HC RX W HCPCS: Performed by: PHYSICIAN ASSISTANT

## 2023-02-22 PROCEDURE — 1100000000 HC RM PRIVATE

## 2023-02-22 PROCEDURE — 96374 THER/PROPH/DIAG INJ IV PUSH: CPT

## 2023-02-22 RX ORDER — INSULIN LISPRO 100 [IU]/ML
0-4 INJECTION, SOLUTION INTRAVENOUS; SUBCUTANEOUS NIGHTLY
Status: DISCONTINUED | OUTPATIENT
Start: 2023-02-22 | End: 2023-02-24 | Stop reason: HOSPADM

## 2023-02-22 RX ORDER — 0.9 % SODIUM CHLORIDE 0.9 %
1000 INTRAVENOUS SOLUTION INTRAVENOUS
Status: COMPLETED | OUTPATIENT
Start: 2023-02-22 | End: 2023-02-22

## 2023-02-22 RX ORDER — ACETAMINOPHEN 500 MG
1000 TABLET ORAL
Status: COMPLETED | OUTPATIENT
Start: 2023-02-22 | End: 2023-02-22

## 2023-02-22 RX ORDER — METFORMIN HYDROCHLORIDE 500 MG/1
500 TABLET, EXTENDED RELEASE ORAL
COMMUNITY

## 2023-02-22 RX ORDER — KETOROLAC TROMETHAMINE 15 MG/ML
15 INJECTION, SOLUTION INTRAMUSCULAR; INTRAVENOUS ONCE
Status: COMPLETED | OUTPATIENT
Start: 2023-02-22 | End: 2023-02-22

## 2023-02-22 RX ORDER — ONDANSETRON 2 MG/ML
4 INJECTION INTRAMUSCULAR; INTRAVENOUS
Status: COMPLETED | OUTPATIENT
Start: 2023-02-22 | End: 2023-02-22

## 2023-02-22 RX ORDER — INSULIN LISPRO 100 [IU]/ML
0-8 INJECTION, SOLUTION INTRAVENOUS; SUBCUTANEOUS
Status: DISCONTINUED | OUTPATIENT
Start: 2023-02-23 | End: 2023-02-24 | Stop reason: HOSPADM

## 2023-02-22 RX ADMIN — ACETAMINOPHEN 1000 MG: 500 TABLET, FILM COATED ORAL at 19:37

## 2023-02-22 RX ADMIN — KETOROLAC TROMETHAMINE 15 MG: 15 INJECTION, SOLUTION INTRAMUSCULAR; INTRAVENOUS at 19:41

## 2023-02-22 RX ADMIN — SODIUM CHLORIDE 1000 ML: 9 INJECTION, SOLUTION INTRAVENOUS at 19:32

## 2023-02-22 RX ADMIN — ONDANSETRON 4 MG: 2 INJECTION INTRAMUSCULAR; INTRAVENOUS at 19:36

## 2023-02-22 RX ADMIN — INSULIN LISPRO 4 UNITS: 100 INJECTION, SOLUTION INTRAVENOUS; SUBCUTANEOUS at 22:40

## 2023-02-22 ASSESSMENT — PAIN DESCRIPTION - LOCATION
LOCATION: GENERALIZED

## 2023-02-22 ASSESSMENT — ENCOUNTER SYMPTOMS
COUGH: 1
SHORTNESS OF BREATH: 1
ABDOMINAL PAIN: 0
SORE THROAT: 1

## 2023-02-22 ASSESSMENT — PAIN SCALES - GENERAL
PAINLEVEL_OUTOF10: 6
PAINLEVEL_OUTOF10: 7
PAINLEVEL_OUTOF10: 9
PAINLEVEL_OUTOF10: 9

## 2023-02-22 ASSESSMENT — PAIN - FUNCTIONAL ASSESSMENT: PAIN_FUNCTIONAL_ASSESSMENT: 0-10

## 2023-02-22 NOTE — ED PROVIDER NOTES
Emergency Department Provider Note                   PCP:                Donnia Cabot, MD               Age: 42 y.o. Sex: male       ICD-10-CM    1. Influenza A  J10.1       2. Hypoxia  R09.02       3. Type 2 diabetes mellitus with hyperglycemia, unspecified whether long term insulin use ([facility] [address]  E11.65           DISPOSITION Decision To Transfer 02/22/2023 08:41:48 PM        Sushila Hinson is a 42 y.o. male who presents to the Emergency Department with chief complaint of    Chief Complaint   Patient presents with    Influenza     Dx flu 2/20        61-year-old male here with chief complaint of cough, fever and flu a positive. He was seen here 2 days ago previously, diagnosed with flu. He reports continued worsening of his symptoms. He states he has been using cough medication given to him by his PCP with no significant treatment symptoms. Has had persistent nausea, unrelieved by at home medications. Reports worsening of symptoms since 2 days ago. The history is provided by the patient. No  was used. Review of Systems   Constitutional:  Positive for chills and fever. HENT:  Positive for congestion and sore throat. Respiratory:  Positive for cough and shortness of breath. Gastrointestinal:  Negative for abdominal pain. Genitourinary:  Negative for dysuria. Musculoskeletal:  Positive for myalgias. Neurological:  Positive for headaches. Psychiatric/Behavioral:  The patient is not nervous/anxious.       Past Medical History:   Diagnosis Date    Arrhythmia     \" artery in heart goes into spams\"  no meds    Depression with anxiety 4/11/2017    Diabetes ([facility] [address]     type ll age 25, avgfbs 63, last A1C was 15 ,     Essential hypertension 9/2/2018    Hypercholesterolemia     Morbid obesity ([facility] [address]     bmi 15    Nausea & vomiting     Renal stone     Stroke [facility])         Past Surgical History:   Procedure Laterality Date    ADENOIDECTOMY  age13    APPENDECTOMY  1996 ORTHOPEDIC SURGERY  2015    right shoulder repair    ORTHOPEDIC SURGERY      left knee x 3  scopes    TOE AMPUTATION      TONSILLECTOMY  age 10        Family History   Problem Relation Age of Onset    Kidney Disease Mother     Diabetes Father     Kidney Disease Father         Social History     Socioeconomic History    Marital status:    Tobacco Use    Smoking status: Never    Smokeless tobacco: Never   Substance and Sexual Activity    Alcohol use: Yes    Drug use: No         Promethazine and Metformin     Previous Medications    ATOMOXETINE (STRATTERA) 40 MG CAPSULE    Take 40 mg by mouth daily    BENZONATATE (TESSALON) 200 MG CAPSULE    Take 1 capsule by mouth 3 times daily as needed for Cough    BUSPIRONE (BUSPAR) 10 MG TABLET    Take 10 mg by mouth 3 times daily    DESVENLAFAXINE SUCCINATE (PRISTIQ) 50 MG TB24 EXTENDED RELEASE TABLET        GLIPIZIDE (GLUCOTROL) 10 MG TABLET    Take 10 mg by mouth 2 times daily    INSULIN GLARGINE (LANTUS) 100 UNIT/ML INJECTION VIAL    Inject 35 Units into the skin    INSULIN GLARGINE (LANTUS;BASAGLAR) 100 UNIT/ML INJECTION PEN    25 units per day Indications: type 2 diabetes mellitus    INSULIN LISPRO, 1 UNIT DIAL, 100 UNIT/ML SOPN    Inject into the skin    LANCETS MISC    by NOT APPLICABLE route 3 times daily    LURASIDONE (LATUDA) 120 MG TABLET    Take 40 mg by mouth    NAPROXEN (NAPROSYN) 500 MG TABLET    Take 1 tablet by mouth 2 times daily (with meals) for 14 days    ONDANSETRON (ZOFRAN-ODT) 4 MG DISINTEGRATING TABLET    Take 1 tablet by mouth 3 times daily as needed for Nausea or Vomiting    RIVAROXABAN (XARELTO) 20 MG TABS TABLET    Take 20 mg by mouth    SILDENAFIL (VIAGRA) 100 MG TABLET    Take 100 mg by mouth as needed    TADALAFIL (CIALIS) 20 MG TABLET    Take 1 tablet by mouth as needed for Erectile Dysfunction    ZOLPIDEM (AMBIEN) 5 MG TABLET    Take 5 mg by mouth. Vitals signs and nursing note reviewed.    Patient Vitals for the past 4 hrs:   Temp Pulse Resp BP SpO2   02/22/23 2038 -- 83 18 122/77 94 %   02/22/23 2037 -- -- -- -- (!) 87 %   02/22/23 2029 -- 81 16 135/77 (!) 89 %   02/22/23 2025 98.7 °F (37.1 °C) -- -- -- --   02/22/23 1949 -- 85 -- 118/75 94 %   02/22/23 1945 -- 90 -- 122/80 (!) 87 %   02/22/23 1907 99.9 °F (37.7 °C) 92 20 (!) 144/85 92 %          Physical Exam  Vitals and nursing note reviewed. Constitutional:       General: He is not in acute distress. Appearance: Normal appearance. He is not ill-appearing, toxic-appearing or diaphoretic. HENT:      Head: Normocephalic and atraumatic. Nose: Nose normal.      Mouth/Throat:      Mouth: Mucous membranes are moist.   Eyes:      Pupils: Pupils are equal, round, and reactive to light. Cardiovascular:      Rate and Rhythm: Normal rate. Pulmonary:      Effort: Pulmonary effort is normal. No respiratory distress. Abdominal:      General: Abdomen is flat. Palpations: Abdomen is soft. Tenderness: There is no abdominal tenderness. Musculoskeletal:         General: Normal range of motion. Cervical back: Normal range of motion. No rigidity. Skin:     General: Skin is warm. Neurological:      General: No focal deficit present. Mental Status: He is alert. Psychiatric:         Mood and Affect: Mood normal.        Procedures    Medical Decision Making  52-year-old male, history of type 2 diabetes, presents this department with chief complaint of worsening generalized body aches, overall influenza syndrome, patient was diagnosed with flu in this department 2 days ago. He has had severe and persistent nausea, uncontrolled by Zofran. As such, he has been unable to tolerate his diabetic medications and his blood sugars been rising. Additionally, he was having some exertional shortness of breath and dizziness with ambulation. It was noted that his oxygen persistently dropped below 90% when both ambulating and at rest while speaking.   It took 2 L of nasal cannula to bring him up to about 94% oxygenation. Chest x-ray was obtained, demonstrated some scattered subsegmental atelectasis. I agree with the radiologist impression. Given that the patient's poorly controlled diabetes secondary to nausea, worsening shortness of breath and documented hypoxia, I discussed with patient I feel like he would benefit from being hospitalized. After long discussion and shared decision making, patient agreed to be admitted to the hospital service. I then contacted hospitalist service, Dr. Bunny Salmon, we discussed the case and she graciously excepts patient's care. Patient will need to be [facility] 5 bed. This was all discussed with patient afterwards, he is still amenable to being admitted and is appreciative of care plan. Problems Addressed:  Hypoxia: acute illness or injury with systemic symptoms that poses a threat to life or bodily functions  Influenza A: acute illness or injury with systemic symptoms  Type 2 diabetes mellitus with hyperglycemia, unspecified whether long term insulin use ([facility] [address]): acute illness or injury    Amount and/or Complexity of Data Reviewed  Labs: ordered. Radiology: ordered. Details: agree with rads    Risk  OTC drugs. Prescription drug management. Complexity of Problem:1 acute or chronic illness that poses a threat to life or bodily function. (5)  The patients assessment required an independent historian: I spoke with a family member. I have conducted an independent ordering and review of Labs. I have conducted an independent ordering and review of X-rays. I have reviewed records from an external source: ED records from outside this hospital.  Considerations: Shared decision making was utilized in the care of this patient. We discussed care recommended by provider that patient declined (tests, disposition, etc).   We discussed care requested by patient that the provider declined (includes tests, admit, dc, antibiotics, etc). I discussed disposition with another provider. Patient was admitted I have communication with admitting physician. Orders Placed This Encounter   Procedures    XR CHEST 1 VIEW    CBC with Auto Differential    Comprehensive Metabolic Panel        Medications   0.9 % sodium chloride bolus (0 mLs IntraVENous Stopped 2/22/23 2048)   ondansetron (ZOFRAN) injection 4 mg (4 mg IntraVENous Given 2/22/23 1936)   acetaminophen (TYLENOL) tablet 1,000 mg (1,000 mg Oral Given 2/22/23 1937)   ketorolac (TORADOL) injection 15 mg (15 mg IntraVENous Given 2/22/23 1941)       New Prescriptions    No medications on file        Results for orders placed or performed during the hospital encounter of 02/22/23   XR CHEST 1 VIEW    Narrative    EXAM:  XR CHEST 1 VIEW ,  2/22/2023 7:22 PM    INDICATION:  Cough. COMPARISON:  None. FINDINGS: The cardiomediastinal contours are within normal limits. There are slightly low lung volumes with scattered subsegmental atelectasis. There is no pleural effusion or pneumothorax. The visualized bones and upper abdomen are unremarkable. Impression    1. Slightly low lung volumes with scattered subsegmental atelectasis. This exam was interpreted by a board-certified, body-imaging fellowship trained   radiologist from [address] [place] [address]. If there are any questions regarding   this examination please feel free to contact a radiologist at [phone].     [address]     Klarissa Rank   2/22/2023 7:37:00 PM   CBC with Auto Differential   Result Value Ref Range    WBC 5.6 4.3 - 11.1 K/uL    RBC 5.51 4.23 - 5.60 M/uL    Hemoglobin 13.8 13.6 - 17.2 g/dL    Hematocrit 42.1 41.1 - 50.3 %    MCV 76.4 (L) 82.0 - 102.0 FL    MCH 25.0 (L) 26.1 - 32.9 PG    MCHC 32.8 31.4 - 35.0 g/dL    RDW 13.9 11.9 - 14.6 %    Platelets 244 917 - 370 K/uL    MPV 9.1 (L) 9.4 - 12.3 FL    nRBC 0.00 0.0 - 0.2 K/uL    Differential Type AUTOMATED      Seg Neutrophils 67 43 - 78 %    Lymphocytes 20 13 - 44 %    Monocytes 9 4.0 - 12.0 %    Eosinophils % 3 0.5 - 7.8 %    Basophils 1 0.0 - 2.0 %    Immature Granulocytes 1 0.0 - 5.0 %    Segs Absolute 3.8 1.7 - 8.2 K/UL    Absolute Lymph # 1.1 0.5 - 4.6 K/UL    Absolute Mono # 0.5 0.1 - 1.3 K/UL    Absolute Eos # 0.1 0.0 - 0.8 K/UL    Basophils Absolute 0.1 0.0 - 0.2 K/UL    Absolute Immature Granulocyte 0.0 0.0 - 0.5 K/UL   Comprehensive Metabolic Panel   Result Value Ref Range    Sodium 131 (L) 133 - 143 mmol/L    Potassium 4.4 3.5 - 5.1 mmol/L    Chloride 92 (L) 98 - 107 mmol/L    CO2 27 21 - 32 mmol/L    Anion Gap 12 (H) 2 - 11 mmol/L    Glucose 374 (H) 65 - 100 mg/dL    BUN 21 (H) 7.0 - 18.0 MG/DL    Creatinine 1.06 0.8 - 1.5 MG/DL    Est, Glom Filt Rate >60 >60 ml/min/1.73m2    Calcium 9.0 8.3 - 10.4 MG/DL    Total Bilirubin 0.2 0.2 - 1.1 MG/DL    ALT 31 13.0 - 61.0 U/L    AST 17 15 - 37 U/L    Alk Phosphatase 154 (H) 45.0 - 117.0 U/L    Total Protein 7.6 6.4 - 8.2 g/dL    Albumin 4.0 3.5 - 5.0 g/dL    Globulin 3.6 2.8 - 4.5 g/dL    Albumin/Globulin Ratio 1.1 0.4 - 1.6          XR CHEST 1 VIEW   Final Result      1. Slightly low lung volumes with scattered subsegmental atelectasis. This exam was interpreted by a board-certified, body-imaging fellowship trained    radiologist from [address] [place] [address]. If there are any questions regarding    this examination please feel free to contact a radiologist at [phone]. [address]       Roland Daniel    2/22/2023 7:37:00 PM                          Voice dictation software was used during the making of this note. This software is not perfect and grammatical and other typographical errors may be present. This note has not been completely proofread for errors.      DAPHNE Pop  02/22/23 2050

## 2023-02-23 LAB
ANION GAP SERPL CALC-SCNC: 5 MMOL/L (ref 2–11)
BASOPHILS # BLD: 0 K/UL (ref 0–0.2)
BASOPHILS NFR BLD: 1 % (ref 0–2)
BUN SERPL-MCNC: 21 MG/DL (ref 6–23)
CALCIUM SERPL-MCNC: 8.4 MG/DL (ref 8.3–10.4)
CHLORIDE SERPL-SCNC: 100 MMOL/L (ref 101–110)
CO2 SERPL-SCNC: 28 MMOL/L (ref 21–32)
CREAT SERPL-MCNC: 1 MG/DL (ref 0.8–1.5)
DIFFERENTIAL METHOD BLD: ABNORMAL
EKG ATRIAL RATE: 69 BPM
EKG DIAGNOSIS: NORMAL
EKG P AXIS: 50 DEGREES
EKG P-R INTERVAL: 166 MS
EKG Q-T INTERVAL: 372 MS
EKG QRS DURATION: 92 MS
EKG QTC CALCULATION (BAZETT): 398 MS
EKG R AXIS: -6 DEGREES
EKG T AXIS: 41 DEGREES
EKG VENTRICULAR RATE: 69 BPM
EOSINOPHIL # BLD: 0.1 K/UL (ref 0–0.8)
EOSINOPHIL NFR BLD: 3 % (ref 0.5–7.8)
ERYTHROCYTE [DISTWIDTH] IN BLOOD BY AUTOMATED COUNT: 14.4 % (ref 11.9–14.6)
GLUCOSE BLD STRIP.AUTO-MCNC: 200 MG/DL (ref 65–100)
GLUCOSE BLD STRIP.AUTO-MCNC: 243 MG/DL (ref 65–100)
GLUCOSE BLD STRIP.AUTO-MCNC: 254 MG/DL (ref 65–100)
GLUCOSE BLD STRIP.AUTO-MCNC: 270 MG/DL (ref 65–100)
GLUCOSE SERPL-MCNC: 334 MG/DL (ref 65–100)
HCT VFR BLD AUTO: 39.9 % (ref 41.1–50.3)
HGB BLD-MCNC: 12.7 G/DL (ref 13.6–17.2)
IMM GRANULOCYTES # BLD AUTO: 0 K/UL (ref 0–0.5)
IMM GRANULOCYTES NFR BLD AUTO: 1 % (ref 0–5)
LYMPHOCYTES # BLD: 1.2 K/UL (ref 0.5–4.6)
LYMPHOCYTES NFR BLD: 31 % (ref 13–44)
MCH RBC QN AUTO: 24.9 PG (ref 26.1–32.9)
MCHC RBC AUTO-ENTMCNC: 31.8 G/DL (ref 31.4–35)
MCV RBC AUTO: 78.2 FL (ref 82–102)
MONOCYTES # BLD: 0.5 K/UL (ref 0.1–1.3)
MONOCYTES NFR BLD: 13 % (ref 4–12)
NEUTS SEG # BLD: 2 K/UL (ref 1.7–8.2)
NEUTS SEG NFR BLD: 51 % (ref 43–78)
NRBC # BLD: 0 K/UL (ref 0–0.2)
PLATELET # BLD AUTO: 233 K/UL (ref 150–450)
PMV BLD AUTO: 9.8 FL (ref 9.4–12.3)
POTASSIUM SERPL-SCNC: 4.1 MMOL/L (ref 3.5–5.1)
RBC # BLD AUTO: 5.1 M/UL (ref 4.23–5.6)
SERVICE CMNT-IMP: ABNORMAL
SODIUM SERPL-SCNC: 133 MMOL/L (ref 133–143)
TROPONIN I SERPL HS-MCNC: 7.9 PG/ML (ref 0–14)
WBC # BLD AUTO: 3.9 K/UL (ref 4.3–11.1)

## 2023-02-23 PROCEDURE — 6370000000 HC RX 637 (ALT 250 FOR IP): Performed by: FAMILY MEDICINE

## 2023-02-23 PROCEDURE — 6370000000 HC RX 637 (ALT 250 FOR IP): Performed by: HOSPITALIST

## 2023-02-23 PROCEDURE — 1100000000 HC RM PRIVATE

## 2023-02-23 PROCEDURE — 6360000002 HC RX W HCPCS: Performed by: FAMILY MEDICINE

## 2023-02-23 PROCEDURE — 82962 GLUCOSE BLOOD TEST: CPT

## 2023-02-23 PROCEDURE — 80048 BASIC METABOLIC PNL TOTAL CA: CPT

## 2023-02-23 PROCEDURE — 94760 N-INVAS EAR/PLS OXIMETRY 1: CPT

## 2023-02-23 PROCEDURE — 84484 ASSAY OF TROPONIN QUANT: CPT

## 2023-02-23 PROCEDURE — 36415 COLL VENOUS BLD VENIPUNCTURE: CPT

## 2023-02-23 PROCEDURE — 93005 ELECTROCARDIOGRAM TRACING: CPT | Performed by: HOSPITALIST

## 2023-02-23 PROCEDURE — 2580000003 HC RX 258: Performed by: FAMILY MEDICINE

## 2023-02-23 PROCEDURE — 6360000002 HC RX W HCPCS: Performed by: HOSPITALIST

## 2023-02-23 PROCEDURE — 2700000000 HC OXYGEN THERAPY PER DAY

## 2023-02-23 PROCEDURE — 85025 COMPLETE CBC W/AUTO DIFF WBC: CPT

## 2023-02-23 RX ORDER — OSELTAMIVIR PHOSPHATE 75 MG/1
75 CAPSULE ORAL 2 TIMES DAILY
Status: DISCONTINUED | OUTPATIENT
Start: 2023-02-23 | End: 2023-02-24 | Stop reason: HOSPADM

## 2023-02-23 RX ORDER — MORPHINE SULFATE 2 MG/ML
2 INJECTION, SOLUTION INTRAMUSCULAR; INTRAVENOUS ONCE
Status: COMPLETED | OUTPATIENT
Start: 2023-02-23 | End: 2023-02-23

## 2023-02-23 RX ORDER — DESVENLAFAXINE 50 MG/1
50 TABLET, EXTENDED RELEASE ORAL DAILY
Status: DISCONTINUED | OUTPATIENT
Start: 2023-02-23 | End: 2023-02-24 | Stop reason: HOSPADM

## 2023-02-23 RX ORDER — ACETAMINOPHEN 325 MG/1
650 TABLET ORAL EVERY 6 HOURS PRN
Status: DISCONTINUED | OUTPATIENT
Start: 2023-02-23 | End: 2023-02-24 | Stop reason: HOSPADM

## 2023-02-23 RX ORDER — ASPIRIN 325 MG
325 TABLET ORAL ONCE
Status: COMPLETED | OUTPATIENT
Start: 2023-02-23 | End: 2023-02-23

## 2023-02-23 RX ORDER — SODIUM CHLORIDE 9 MG/ML
INJECTION, SOLUTION INTRAVENOUS PRN
Status: DISCONTINUED | OUTPATIENT
Start: 2023-02-23 | End: 2023-02-24 | Stop reason: HOSPADM

## 2023-02-23 RX ORDER — DEXTROSE MONOHYDRATE 100 MG/ML
INJECTION, SOLUTION INTRAVENOUS CONTINUOUS PRN
Status: DISCONTINUED | OUTPATIENT
Start: 2023-02-23 | End: 2023-02-24 | Stop reason: HOSPADM

## 2023-02-23 RX ORDER — OXYMETAZOLINE HYDROCHLORIDE 0.05 G/100ML
2 SPRAY NASAL 2 TIMES DAILY
Status: DISCONTINUED | OUTPATIENT
Start: 2023-02-23 | End: 2023-02-24 | Stop reason: HOSPADM

## 2023-02-23 RX ORDER — ONDANSETRON 2 MG/ML
4 INJECTION INTRAMUSCULAR; INTRAVENOUS EVERY 6 HOURS PRN
Status: DISCONTINUED | OUTPATIENT
Start: 2023-02-23 | End: 2023-02-24 | Stop reason: HOSPADM

## 2023-02-23 RX ORDER — KETOROLAC TROMETHAMINE 15 MG/ML
15 INJECTION, SOLUTION INTRAMUSCULAR; INTRAVENOUS EVERY 6 HOURS PRN
Status: DISCONTINUED | OUTPATIENT
Start: 2023-02-23 | End: 2023-02-24 | Stop reason: HOSPADM

## 2023-02-23 RX ORDER — SODIUM CHLORIDE 0.9 % (FLUSH) 0.9 %
5-40 SYRINGE (ML) INJECTION PRN
Status: DISCONTINUED | OUTPATIENT
Start: 2023-02-23 | End: 2023-02-24 | Stop reason: HOSPADM

## 2023-02-23 RX ORDER — INSULIN GLARGINE 100 [IU]/ML
20 INJECTION, SOLUTION SUBCUTANEOUS DAILY
Status: DISCONTINUED | OUTPATIENT
Start: 2023-02-23 | End: 2023-02-24 | Stop reason: HOSPADM

## 2023-02-23 RX ORDER — HYDROCODONE BITARTRATE AND ACETAMINOPHEN 5; 325 MG/1; MG/1
1 TABLET ORAL EVERY 6 HOURS PRN
Status: DISCONTINUED | OUTPATIENT
Start: 2023-02-23 | End: 2023-02-24 | Stop reason: HOSPADM

## 2023-02-23 RX ORDER — LANOLIN ALCOHOL/MO/W.PET/CERES
3 CREAM (GRAM) TOPICAL ONCE
Status: COMPLETED | OUTPATIENT
Start: 2023-02-23 | End: 2023-02-23

## 2023-02-23 RX ORDER — HYDRALAZINE HYDROCHLORIDE 20 MG/ML
10 INJECTION INTRAMUSCULAR; INTRAVENOUS EVERY 6 HOURS PRN
Status: DISCONTINUED | OUTPATIENT
Start: 2023-02-23 | End: 2023-02-24 | Stop reason: HOSPADM

## 2023-02-23 RX ORDER — POLYETHYLENE GLYCOL 3350 17 G/17G
17 POWDER, FOR SOLUTION ORAL DAILY PRN
Status: DISCONTINUED | OUTPATIENT
Start: 2023-02-23 | End: 2023-02-24 | Stop reason: HOSPADM

## 2023-02-23 RX ORDER — ONDANSETRON 4 MG/1
4 TABLET, ORALLY DISINTEGRATING ORAL EVERY 8 HOURS PRN
Status: DISCONTINUED | OUTPATIENT
Start: 2023-02-23 | End: 2023-02-24 | Stop reason: HOSPADM

## 2023-02-23 RX ORDER — GUAIFENESIN/DEXTROMETHORPHAN 100-10MG/5
5 SYRUP ORAL EVERY 4 HOURS PRN
Status: DISCONTINUED | OUTPATIENT
Start: 2023-02-23 | End: 2023-02-24 | Stop reason: HOSPADM

## 2023-02-23 RX ORDER — OXYMETAZOLINE HYDROCHLORIDE 0.05 G/100ML
2 SPRAY NASAL 2 TIMES DAILY
COMMUNITY

## 2023-02-23 RX ORDER — ENOXAPARIN SODIUM 100 MG/ML
30 INJECTION SUBCUTANEOUS 2 TIMES DAILY
Status: DISCONTINUED | OUTPATIENT
Start: 2023-02-23 | End: 2023-02-24 | Stop reason: HOSPADM

## 2023-02-23 RX ORDER — SODIUM CHLORIDE 0.9 % (FLUSH) 0.9 %
5-40 SYRINGE (ML) INJECTION EVERY 12 HOURS SCHEDULED
Status: DISCONTINUED | OUTPATIENT
Start: 2023-02-23 | End: 2023-02-24 | Stop reason: HOSPADM

## 2023-02-23 RX ORDER — ACETAMINOPHEN 650 MG/1
650 SUPPOSITORY RECTAL EVERY 6 HOURS PRN
Status: DISCONTINUED | OUTPATIENT
Start: 2023-02-23 | End: 2023-02-24 | Stop reason: HOSPADM

## 2023-02-23 RX ORDER — NITROGLYCERIN 0.4 MG/1
0.4 TABLET SUBLINGUAL EVERY 5 MIN PRN
Status: DISCONTINUED | OUTPATIENT
Start: 2023-02-23 | End: 2023-02-24 | Stop reason: HOSPADM

## 2023-02-23 RX ORDER — SODIUM CHLORIDE 9 MG/ML
INJECTION, SOLUTION INTRAVENOUS CONTINUOUS
Status: DISCONTINUED | OUTPATIENT
Start: 2023-02-23 | End: 2023-02-24 | Stop reason: HOSPADM

## 2023-02-23 RX ADMIN — ENOXAPARIN SODIUM 30 MG: 100 INJECTION SUBCUTANEOUS at 20:40

## 2023-02-23 RX ADMIN — ENOXAPARIN SODIUM 30 MG: 100 INJECTION SUBCUTANEOUS at 09:00

## 2023-02-23 RX ADMIN — GUAIFENESIN SYRUP AND DEXTROMETHORPHAN 5 ML: 100; 10 SYRUP ORAL at 17:14

## 2023-02-23 RX ADMIN — ONDANSETRON 4 MG: 4 TABLET, ORALLY DISINTEGRATING ORAL at 03:33

## 2023-02-23 RX ADMIN — OSELTAMIVIR PHOSPHATE 75 MG: 75 CAPSULE ORAL at 09:00

## 2023-02-23 RX ADMIN — GUAIFENESIN SYRUP AND DEXTROMETHORPHAN 5 ML: 100; 10 SYRUP ORAL at 09:00

## 2023-02-23 RX ADMIN — INSULIN LISPRO 4 UNITS: 100 INJECTION, SOLUTION INTRAVENOUS; SUBCUTANEOUS at 09:01

## 2023-02-23 RX ADMIN — KETOROLAC TROMETHAMINE 15 MG: 15 INJECTION, SOLUTION INTRAMUSCULAR; INTRAVENOUS at 11:38

## 2023-02-23 RX ADMIN — Medication 3 MG: at 00:17

## 2023-02-23 RX ADMIN — SODIUM CHLORIDE, PRESERVATIVE FREE 10 ML: 5 INJECTION INTRAVENOUS at 20:40

## 2023-02-23 RX ADMIN — OSELTAMIVIR PHOSPHATE 75 MG: 75 CAPSULE ORAL at 00:18

## 2023-02-23 RX ADMIN — SODIUM CHLORIDE: 9 INJECTION, SOLUTION INTRAVENOUS at 22:11

## 2023-02-23 RX ADMIN — OSELTAMIVIR PHOSPHATE 75 MG: 75 CAPSULE ORAL at 20:40

## 2023-02-23 RX ADMIN — INSULIN LISPRO 4 UNITS: 100 INJECTION, SOLUTION INTRAVENOUS; SUBCUTANEOUS at 13:02

## 2023-02-23 RX ADMIN — SODIUM CHLORIDE: 9 INJECTION, SOLUTION INTRAVENOUS at 10:30

## 2023-02-23 RX ADMIN — INSULIN LISPRO 2 UNITS: 100 INJECTION, SOLUTION INTRAVENOUS; SUBCUTANEOUS at 17:14

## 2023-02-23 RX ADMIN — SODIUM CHLORIDE: 9 INJECTION, SOLUTION INTRAVENOUS at 00:18

## 2023-02-23 RX ADMIN — HYDROCODONE BITARTRATE AND ACETAMINOPHEN 1 TABLET: 5; 325 TABLET ORAL at 09:00

## 2023-02-23 RX ADMIN — INSULIN GLARGINE 20 UNITS: 100 INJECTION, SOLUTION SUBCUTANEOUS at 09:01

## 2023-02-23 RX ADMIN — HYDROCODONE BITARTRATE AND ACETAMINOPHEN 1 TABLET: 5; 325 TABLET ORAL at 00:17

## 2023-02-23 RX ADMIN — OXYMETAZOLINE HCL 2 SPRAY: 0.05 SPRAY NASAL at 20:41

## 2023-02-23 RX ADMIN — SODIUM CHLORIDE, PRESERVATIVE FREE 10 ML: 5 INJECTION INTRAVENOUS at 09:01

## 2023-02-23 RX ADMIN — OXYMETAZOLINE HCL 2 SPRAY: 0.05 SPRAY NASAL at 11:38

## 2023-02-23 RX ADMIN — ASPIRIN 325 MG ORAL TABLET 325 MG: 325 PILL ORAL at 15:10

## 2023-02-23 RX ADMIN — ONDANSETRON 4 MG: 2 INJECTION INTRAMUSCULAR; INTRAVENOUS at 20:26

## 2023-02-23 RX ADMIN — KETOROLAC TROMETHAMINE 15 MG: 15 INJECTION, SOLUTION INTRAMUSCULAR; INTRAVENOUS at 17:14

## 2023-02-23 RX ADMIN — NITROGLYCERIN 0.4 MG: 0.4 TABLET, ORALLY DISINTEGRATING SUBLINGUAL at 15:10

## 2023-02-23 RX ADMIN — Medication 3 MG: at 22:11

## 2023-02-23 RX ADMIN — POLYETHYLENE GLYCOL 3350 17 G: 17 POWDER, FOR SOLUTION ORAL at 17:34

## 2023-02-23 RX ADMIN — ONDANSETRON 4 MG: 2 INJECTION INTRAMUSCULAR; INTRAVENOUS at 14:27

## 2023-02-23 RX ADMIN — GUAIFENESIN SYRUP AND DEXTROMETHORPHAN 5 ML: 100; 10 SYRUP ORAL at 05:38

## 2023-02-23 RX ADMIN — MORPHINE SULFATE 2 MG: 2 INJECTION, SOLUTION INTRAMUSCULAR; INTRAVENOUS at 15:10

## 2023-02-23 ASSESSMENT — PAIN DESCRIPTION - DESCRIPTORS
DESCRIPTORS: ACHING
DESCRIPTORS: ACHING;SORE

## 2023-02-23 ASSESSMENT — PAIN SCALES - GENERAL
PAINLEVEL_OUTOF10: 5
PAINLEVEL_OUTOF10: 4
PAINLEVEL_OUTOF10: 7
PAINLEVEL_OUTOF10: 6
PAINLEVEL_OUTOF10: 10
PAINLEVEL_OUTOF10: 9

## 2023-02-23 ASSESSMENT — PAIN DESCRIPTION - LOCATION
LOCATION: OTHER (COMMENT)
LOCATION: GENERALIZED

## 2023-02-23 NOTE — CARE COORDINATION
02/23/23 0950   Service Assessment   Patient Orientation Alert and Oriented   Cognition Alert   History Provided By Patient   Primary Caregiver Self   Support Systems Spouse/Significant Other   PCP Verified by CM Yes   Current Functional Level Independent in ADLs/IADLs   Can patient return to prior living arrangement Yes   Ability to make needs known: Good   Family able to assist with home care needs: Yes   Would you like for me to discuss the discharge plan with any other family members/significant others, and if so, who? Yes   Financial Resources Other (Comment)   Social/Functional History   Lives With Spouse   Type of Home Mobile home   Home Access Stairs to enter with rails   ADL [address] [facility] Responsibilities No   Ambulation Assistance Independent   Transfer Assistance Independent   Active  Yes   Patient is employed full time. DME: none  History of HH and rehab (after a toe amputation)  Drives. Confirmed patient has a pcp. Lives with his wife and stepson. CM following for discharge needs.

## 2023-02-23 NOTE — PROGRESS NOTES
Patient has arrived via stretcher from [facility] ED. Patient was able to ambulate to the bed unassisted. Patient is on 2L NC with no s/s of distress. Dual skin assessment was done with DECGENNARO [facility] RN. Patient does have amputation of right big toe. Scattered bruising on the right lower leg, but no signs of pressure injuries. No needs expressed at this time. Patient was acclimated to the room and was instructed to call for assistance. Will continue to monitor.

## 2023-02-23 NOTE — ED NOTES
TRANSFER - OUT REPORT:    Verbal report given to [address] [place] [address] Street on Juni Macias  being transferred to  for routine progression of patient care       Report consisted of patient's Situation, Background, Assessment and   Recommendations(SBAR). Information from the following report(s) Nurse Handoff Report, ED Encounter Summary, ED SBAR, [facility] and Recent Results was reviewed with the receiving nurse. [place] Assessment: Presents to emergency department  because of falls (Syncope, seizure, or loss of consciousness): No, Age > 79: No, Altered Mental Status, Intoxication with alcohol or substance confusion (Disorientation, impaired judgment, poor safety awaremess, or inability to follow instructions): No, Impaired Mobility: Ambulates or transfers with assistive devices or assistance; Unable to ambulate or transer.: No, Nursing Judgement: No  Lines:   Peripheral IV 02/22/23 Left Antecubital (Active)        Opportunity for questions and clarification was provided.       Patient transported with:  Monitor and O2 @ 2lpm with [address] RN  02/22/23 6065

## 2023-02-23 NOTE — ASSESSMENT & PLAN NOTE
- Appears that patient was previously on lantus (as well as glipizide), however, patient now only reports taking metformin  - Holding metformin during hospitalization  - SSI  - Diabetic diet

## 2023-02-23 NOTE — ED NOTES
Pt and nurse walked lap around ED. PT maintained oxygen above 90 ranging 92-93% on room air.       Lola Hodge RN  02/22/23 2014

## 2023-02-23 NOTE — PROGRESS NOTES
Patient request pain medication for generalized body aches 6/10. Norco given per orders. Will continue to monitor.

## 2023-02-23 NOTE — PROGRESS NOTES
Hospitalist Progress Note   Admit Date:  2023 10:02 PM   Name:  Joni Jackson   Age:  42 y.o. Sex:  male  :  1985   MRN:  [id number]   Room:  [facility]    Presenting Complaint: No chief complaint on file. Reason(s) for Admission: Hypoxia [R09.02]     Hospital Course:   Joni Jackson is a 42 y.o. male with medical history of hypertension, diabetes mellitus type 2, obstructive sleep apnea, presented to the ER in [facility] with shortness of breath and myalgias. He was diagnosed with influenza type a 2 days ago. Oxygen saturations were initially normal at PCP office visit. COVID test negative. In the ER patient was found to be hypoxic with oxygen saturation of 87% on room air. Blood glucose elevated at 374. He was not able to take his medications for his diabetes due to nausea. Chest x-ray shows scattered atelectasis. Patient admitted to hospitalist service for further evaluation management. Subjective & 24hr Events (23): Patient reports feeling weak tired, and complaining of myalgias. He also complains of shortness of breath once oxygen saturations improved to 95% on room air. Assessment & Plan: This is a 43-year-old male with    Acute hypoxemic respiratory failure secondary to influenza  Oxygen saturation 97% on room air on admission and is needing 2 L oxygen nasal cannula. This morning oxygen saturation has improved and is currently 95% on room air at rest.  Chest x-ray on admission shows atelectasis. Continue Tamiflu. Uncontrolled diabetes mellitus type 2 with hyperglycemia  Start Lantus. Metformin on hold. Hypertension  Patient is currently not on any antihypertensive medications at home. Blood pressure. Hydralazine as needed    Obstructive sleep apnea  Not using CPAP. Morbid obesity  Lifestyle modifications. Anxiety/depression  Continue home medications Pristiq. Disposition: Anticipate discharge home in 24 to 48 hours.   Home oxygen screen prior to discharge. Diet:  ADULT DIET; Regular; 4 carb choices (60 gm/meal)  VTE prophylaxis: Lovenox  Code status: Full Code    Hospital Problems:  Principal Problem:    Hypoxia  Active Problems:    Uncontrolled diabetes mellitus with hyperglycemia (HCC)    Depression with anxiety    Severe obesity (HCC)    DYLLAN (obstructive sleep apnea)    Essential hypertension  Resolved Problems:    * No resolved hospital problems. *      Objective:   Patient Vitals for the past 24 hrs:   Temp Pulse Resp BP SpO2   02/23/23 1234 97.5 °F (36.4 °C) 70 21 125/89 95 %   02/23/23 0930 -- -- 20 -- --   02/23/23 0747 98.4 °F (36.9 °C) 67 21 (!) 135/90 95 %   02/23/23 0359 98.1 °F (36.7 °C) 80 19 121/77 94 %   02/23/23 0011 98.1 °F (36.7 °C) 70 19 124/76 94 %       Oxygen Therapy  SpO2: 95 %  Pulse Oximeter Device Mode: Intermittent  O2 Device: None (Room air)    Estimated body mass index is 40.69 kg/m² as calculated from the following:    Height as of an earlier encounter on 2/22/23: 6' (1.829 m). Weight as of an earlier encounter on 2/22/23: 300 lb (136.1 kg). No intake or output data in the 24 hours ending 02/23/23 1336      Physical Exam:     Blood pressure 125/89, pulse 70, temperature 97.5 °F (36.4 °C), temperature source Oral, resp. rate 21, SpO2 95 %. General:    Well nourished. Alert awake male, ill-appearing, increased work of breathing, very tired looking,  Head:  Normocephalic, atraumatic  Eyes:  Sclerae appear normal.  Pupils equally round. ENT:  Nares appear normal.  Moist oral mucosa  Neck:  No restricted ROM. Trachea midline   CV:   RRR. No m/r/g. No jugular venous distension. Lungs:   Coarse breath sounds bilaterally, scattered crackles, no wheezing. Symmetric expansion. Abdomen:   Soft, nontender, nondistended. Active bowel sounds, no organomegaly,  Extremities: No cyanosis or clubbing. No edema  Skin:     No rashes and normal coloration. Warm and dry. Neuro:  CN II-XII grossly intact. Psych:  Normal mood and affect.       I have personally reviewed labs and tests:  Recent Labs:  Recent Results (from the past 48 hour(s))   CBC with Auto Differential    Collection Time: 02/22/23  7:19 PM   Result Value Ref Range    WBC 5.6 4.3 - 11.1 K/uL    RBC 5.51 4.23 - 5.60 M/uL    Hemoglobin 13.8 13.6 - 17.2 g/dL    Hematocrit 42.1 41.1 - 50.3 %    MCV 76.4 (L) 82.0 - 102.0 FL    MCH 25.0 (L) 26.1 - 32.9 PG    MCHC 32.8 31.4 - 35.0 g/dL    RDW 13.9 11.9 - 14.6 %    Platelets 581 649 - 292 K/uL    MPV 9.1 (L) 9.4 - 12.3 FL    nRBC 0.00 0.0 - 0.2 K/uL    Differential Type AUTOMATED      Seg Neutrophils 67 43 - 78 %    Lymphocytes 20 13 - 44 %    Monocytes 9 4.0 - 12.0 %    Eosinophils % 3 0.5 - 7.8 %    Basophils 1 0.0 - 2.0 %    Immature Granulocytes 1 0.0 - 5.0 %    Segs Absolute 3.8 1.7 - 8.2 K/UL    Absolute Lymph # 1.1 0.5 - 4.6 K/UL    Absolute Mono # 0.5 0.1 - 1.3 K/UL    Absolute Eos # 0.1 0.0 - 0.8 K/UL    Basophils Absolute 0.1 0.0 - 0.2 K/UL    Absolute Immature Granulocyte 0.0 0.0 - 0.5 K/UL   Comprehensive Metabolic Panel    Collection Time: 02/22/23  7:19 PM   Result Value Ref Range    Sodium 131 (L) 133 - 143 mmol/L    Potassium 4.4 3.5 - 5.1 mmol/L    Chloride 92 (L) 98 - 107 mmol/L    CO2 27 21 - 32 mmol/L    Anion Gap 12 (H) 2 - 11 mmol/L    Glucose 374 (H) 65 - 100 mg/dL    BUN 21 (H) 7.0 - 18.0 MG/DL    Creatinine 1.06 0.8 - 1.5 MG/DL    Est, Glom Filt Rate >60 >60 ml/min/1.73m2    Calcium 9.0 8.3 - 10.4 MG/DL    Total Bilirubin 0.2 0.2 - 1.1 MG/DL    ALT 31 13.0 - 61.0 U/L    AST 17 15 - 37 U/L    Alk Phosphatase 154 (H) 45.0 - 117.0 U/L    Total Protein 7.6 6.4 - 8.2 g/dL    Albumin 4.0 3.5 - 5.0 g/dL    Globulin 3.6 2.8 - 4.5 g/dL    Albumin/Globulin Ratio 1.1 0.4 - 1.6     POCT Glucose    Collection Time: 02/22/23 10:30 PM   Result Value Ref Range    POC Glucose 310 (H) 65 - 100 mg/dL    [facility]formed [facility]: [facility]    Basic Metabolic Panel w/ Reflex to MG    Collection Time: 02/23/23 5:30 AM   Result Value Ref Range    Sodium 133 133 - 143 mmol/L    Potassium 4.1 3.5 - 5.1 mmol/L    Chloride 100 (L) 101 - 110 mmol/L    CO2 28 21 - 32 mmol/L    Anion Gap 5 2 - 11 mmol/L    Glucose 334 (H) 65 - 100 mg/dL    BUN 21 6 - 23 MG/DL    Creatinine 1.00 0.8 - 1.5 MG/DL    Est, Glom Filt Rate >60 >60 ml/min/1.73m2    Calcium 8.4 8.3 - 10.4 MG/DL   CBC with Auto Differential    Collection Time: 02/23/23  5:30 AM   Result Value Ref Range    WBC 3.9 (L) 4.3 - 11.1 K/uL    RBC 5.10 4.23 - 5.6 M/uL    Hemoglobin 12.7 (L) 13.6 - 17.2 g/dL    Hematocrit 39.9 (L) 41.1 - 50.3 %    MCV 78.2 (L) 82 - 102 FL    MCH 24.9 (L) 26.1 - 32.9 PG    MCHC 31.8 31.4 - 35.0 g/dL    RDW 14.4 11.9 - 14.6 %    Platelets 109 717 - 069 K/uL    MPV 9.8 9.4 - 12.3 FL    nRBC 0.00 0.0 - 0.2 K/uL    Differential Type AUTOMATED      Seg Neutrophils 51 43 - 78 %    Lymphocytes 31 13 - 44 %    Monocytes 13 (H) 4.0 - 12.0 %    Eosinophils % 3 0.5 - 7.8 %    Basophils 1 0.0 - 2.0 %    Immature Granulocytes 1 0.0 - 5.0 %    Segs Absolute 2.0 1.7 - 8.2 K/UL    Absolute Lymph # 1.2 0.5 - 4.6 K/UL    Absolute Mono # 0.5 0.1 - 1.3 K/UL    Absolute Eos # 0.1 0.0 - 0.8 K/UL    Basophils Absolute 0.0 0.0 - 0.2 K/UL    Absolute Immature Granulocyte 0.0 0.0 - 0.5 K/UL   POCT Glucose    Collection Time: 02/23/23  8:55 AM   Result Value Ref Range    POC Glucose 270 (H) 65 - 100 mg/dL    Performed by: Farida    POCT Glucose    Collection Time: 02/23/23 12:52 PM   Result Value Ref Range    POC Glucose 254 (H) 65 - 100 mg/dL    Performed by:  JASSON Zaragoza 38        I have personally reviewed imaging studies:  Other Studies:  No orders to display       Current Meds:  Current Facility-Administered Medications   Medication Dose Route Frequency    desvenlafaxine succinate (PRISTIQ) extended release tablet 50 mg (Patient Supplied)  50 mg Oral Daily    sodium chloride flush 0.9 % injection 5-40 mL  5-40 mL IntraVENous 2 times per day    sodium chloride flush 0.9 % injection 5-40 mL  5-40 mL IntraVENous PRN    0.9 % sodium chloride infusion   IntraVENous PRN    enoxaparin Sodium (LOVENOX) injection 30 mg  30 mg SubCUTAneous BID    ondansetron (ZOFRAN-ODT) disintegrating tablet 4 mg  4 mg Oral Q8H PRN    Or    ondansetron (ZOFRAN) injection 4 mg  4 mg IntraVENous Q6H PRN    polyethylene glycol (GLYCOLAX) packet 17 g  17 g Oral Daily PRN    acetaminophen (TYLENOL) tablet 650 mg  650 mg Oral Q6H PRN    Or    acetaminophen (TYLENOL) suppository 650 mg  650 mg Rectal Q6H PRN    0.9 % sodium chloride infusion   IntraVENous Continuous    oseltamivir (TAMIFLU) capsule 75 mg  75 mg Oral BID    HYDROcodone-acetaminophen (NORCO) 5-325 MG per tablet 1 tablet  1 tablet Oral Q6H PRN    guaiFENesin-dextromethorphan (ROBITUSSIN DM) 100-10 MG/5ML syrup 5 mL  5 mL Oral Q4H PRN    hydrALAZINE (APRESOLINE) injection 10 mg  10 mg IntraVENous Q6H PRN    insulin glargine (LANTUS) injection vial 20 Units  20 Units SubCUTAneous Daily    glucose chewable tablet 16 g  4 tablet Oral PRN    dextrose bolus 10% 125 mL  125 mL IntraVENous PRN    Or    dextrose bolus 10% 250 mL  250 mL IntraVENous PRN    glucagon (rDNA) injection 1 mg  1 mg SubCUTAneous PRN    dextrose 10 % infusion   IntraVENous Continuous PRN    ketorolac (TORADOL) injection 15 mg  15 mg IntraVENous Q6H PRN    oxymetazoline (AFRIN) 0.05 % nasal spray 2 spray  2 spray Each Nostril BID    insulin lispro (HUMALOG) injection vial 0-8 Units  0-8 Units SubCUTAneous TID WC    insulin lispro (HUMALOG) injection vial 0-4 Units  0-4 Units SubCUTAneous Nightly       Signed:  Arash James MD    Part of this note may have been written by using a voice dictation software. The note has been proof read but may still contain some grammatical/other typographical errors.

## 2023-02-23 NOTE — ED TRIAGE NOTES
Pt presents c/o increased malaise, SOB with Flu A diagnosed on Monday. States that he has been unable to keep down food or fluids at all today.

## 2023-02-23 NOTE — PROGRESS NOTES
Patient is asleep in the bed. Patient is on 2L NC with no s/s of distress. Call bell is within reach. Will continue to monitor.

## 2023-02-23 NOTE — PROGRESS NOTES
Patient is asleep in the bed. Patient is on 2L NC with no s/s of distress. NS is running at 100mL/hr. No events overnight. No needs expressed at this time. Call bell is within reach. Preparing to give bedside report.

## 2023-02-23 NOTE — ASSESSMENT & PLAN NOTE
- Positive for flu A two days ago  - CXR shows atelectasis  - Will encourage incentive spirometry  - Tamiflu ordered since patient is hospitalized  - Of note, patient also has h/o DYLLAN which may be contributing to hypoxia

## 2023-02-23 NOTE — H&P
Hospitalist History and Physical   Admit Date:  2023 10:02 PM   Name:  Alec Lam   Age:  42 y.o. Sex:  male  :  1985   MRN:  [id number]     Presenting Complaint: SOB  Reason(s) for Admission: Hypoxia [R09.02]     History of Present Illness:   Alec Lam is a 42 y.o. male with medical history of DM2, HTN, DYLLAN who presented to the [facility] ED with SOB, myalgias. Patient was diagnosed with flu A two days ago on . At that time, symptoms had started the day prior. His O2 sat was normal at 98% on RA. COVID was negative. Patient reports to be taking medications as prescribed by his PCP. Denies any improvement in symptoms. Upon ER evaluation, patient is documented to be hypoxic to 87% on RA. BG is elevated at 374, reports that he has been unable to take his diabetes meds due to his nausea. CXR only shows scattered atelectasis. Due to hypoxia, Hospitalist asked to admit. Review of Systems:  10 systems reviewed and negative except as noted in HPI.   Assessment & Plan:   * Hypoxia  Assessment & Plan  - Positive for flu A two days ago  - CXR shows atelectasis  - Will encourage incentive spirometry  - Tamiflu ordered since patient is hospitalized  - Of note, patient also has h/o DYLLAN which may be contributing to hypoxia    Uncontrolled diabetes mellitus with hyperglycemia ([address]  Assessment & Plan  - Appears that patient was previously on lantus (as well as glipizide), however, patient now only reports taking metformin  - Holding metformin during hospitalization  - SSI  - Diabetic diet    Essential hypertension  Assessment & Plan  - Reports that he no longer takes any BP meds  - Monitor BP  - PRN hydralazine    DYLLAN (obstructive sleep apnea)  Assessment & Plan  - Does not use CPAP    Severe obesity ([address]  Assessment & Plan  - Of note    Depression with anxiety  Assessment & Plan  - Per med rec, appears patient is only taking Pristiq currently  - Will continue      Disposition: inpatient      Past medical history reviewed. Past Medical History:   Diagnosis Date    Arrhythmia     \" artery in heart goes into spams\"  no meds    Depression with anxiety 4/11/2017    Diabetes ([facility]     type ll age 25, avgfbs 65, last A1C was 15 ,     Essential hypertension 9/2/2018    Hypercholesterolemia     Morbid obesity ([facility] [facility]     bmi 17    Nausea & vomiting     Renal stone     Stroke [facility])      Past surgical history reviewed. Past Surgical History:   Procedure Laterality Date    ADENOIDECTOMY  age15    APPENDECTOMY  1996    ORTHOPEDIC SURGERY  2015    right shoulder repair    ORTHOPEDIC SURGERY      left knee x 3  scopes    TOE AMPUTATION      TONSILLECTOMY  age 10      Allergies   Allergen Reactions    Promethazine Anaphylaxis     Makes me stop breathing\"    Metformin Diarrhea      Social History     Tobacco Use    Smoking status: Never    Smokeless tobacco: Never   Substance Use Topics    Alcohol use: Yes      Family History   Problem Relation Age of Onset    Kidney Disease Mother     Diabetes Father     Kidney Disease Father       Family history reviewed and noncontributory to patient's acute condition; no relevant family history unless otherwise noted above. Immunization History   Administered Date(s) Administered    COVID-19, MODERNA BLUE border, Primary or Immunocompromised, (age 12y+), IM, 100 mcg/0.5mL 03/27/2021, 04/24/2021     PTA Medications:  Current Outpatient Medications   Medication Instructions    atomoxetine (STRATTERA) 40 mg, DAILY    benzonatate (TESSALON) 200 mg, Oral, 3 TIMES DAILY PRN    busPIRone (BUSPAR) 10 mg, 3 TIMES DAILY    desvenlafaxine succinate (PRISTIQ) 50 MG TB24 extended release tablet No dose, route, or frequency recorded.     glipiZIDE (GLUCOTROL) 10 mg, 2 TIMES DAILY    insulin glargine (LANTUS) 35 Units    insulin glargine (LANTUS;BASAGLAR) 100 UNIT/ML injection pen 25 units per day Indications: type 2 diabetes mellitus    insulin lispro, 1 Unit Dial, 100 UNIT/ML SOPN Inject into the skin    Lancets MISC 3 TIMES DAILY    lurasidone (LATUDA) 40 mg    metFORMIN (GLUCOPHAGE-XR) 500 mg, Oral, DAILY WITH BREAKFAST    naproxen (NAPROSYN) 500 mg, Oral, 2 TIMES DAILY WITH MEALS    ondansetron (ZOFRAN-ODT) 4 mg, Oral, 3 TIMES DAILY PRN    rivaroxaban (XARELTO) 20 mg    sildenafil (VIAGRA) 100 mg, PRN    tadalafil (CIALIS) 20 mg, Oral, PRN    zolpidem (AMBIEN) 5 mg       Objective:   Patient Vitals for the past 24 hrs:   Temp Pulse Resp BP SpO2   02/23/23 0359 98.1 °F (36.7 °C) 80 19 121/77 94 %   02/23/23 0011 98.1 °F (36.7 °C) 70 19 124/76 94 %          Estimated body mass index is 40.69 kg/m² as calculated from the following:    Height as of an earlier encounter on 2/22/23: 6' (1.829 m). Weight as of an earlier encounter on 2/22/23: 300 lb (136.1 kg). No intake or output data in the 24 hours ending 02/23/23 9193      Physical Exam:  General:    Well nourished. No overt distress  Head:  Normocephalic, atraumatic  Eyes:  Sclerae appear normal.  Pupils equally round. HENT:  Nares appear normal, no drainage. Moist mucous membranes  Neck:  No restricted ROM. Trachea midline  CV:   RRR. S1/S2 auscultated  Lungs:   Diminished  Abdomen: Bowel sounds present. Soft, nontender, nondistended. Extremities: Warm and dry. No cyanosis or clubbing. No edema. Skin:     No rashes. Normal turgor. Normal coloration  Neuro:  Cranial nerves II-XII grossly intact. Sensation intact  Psych:  Normal mood and affect.   Alert and oriented x3    Data Ordered and Personally Reviewed:    Last 24hr Labs:  Recent Results (from the past 24 hour(s))   CBC with Auto Differential    Collection Time: 02/22/23  7:19 PM   Result Value Ref Range    WBC 5.6 4.3 - 11.1 K/uL    RBC 5.51 4.23 - 5.60 M/uL    Hemoglobin 13.8 13.6 - 17.2 g/dL    Hematocrit 42.1 41.1 - 50.3 %    MCV 76.4 (L) 82.0 - 102.0 FL    MCH 25.0 (L) 26.1 - 32.9 PG    MCHC 32.8 31.4 - 35.0 g/dL    RDW 13.9 11.9 - 14.6 %    Platelets 260 150 - 450 K/uL    MPV 9.1 (L) 9.4 - 12.3 FL    nRBC 0.00 0.0 - 0.2 K/uL    Differential Type AUTOMATED      Seg Neutrophils 67 43 - 78 %    Lymphocytes 20 13 - 44 %    Monocytes 9 4.0 - 12.0 %    Eosinophils % 3 0.5 - 7.8 %    Basophils 1 0.0 - 2.0 %    Immature Granulocytes 1 0.0 - 5.0 %    Segs Absolute 3.8 1.7 - 8.2 K/UL    Absolute Lymph # 1.1 0.5 - 4.6 K/UL    Absolute Mono # 0.5 0.1 - 1.3 K/UL    Absolute Eos # 0.1 0.0 - 0.8 K/UL    Basophils Absolute 0.1 0.0 - 0.2 K/UL    Absolute Immature Granulocyte 0.0 0.0 - 0.5 K/UL   Comprehensive Metabolic Panel    Collection Time: 02/22/23  7:19 PM   Result Value Ref Range    Sodium 131 (L) 133 - 143 mmol/L    Potassium 4.4 3.5 - 5.1 mmol/L    Chloride 92 (L) 98 - 107 mmol/L    CO2 27 21 - 32 mmol/L    Anion Gap 12 (H) 2 - 11 mmol/L    Glucose 374 (H) 65 - 100 mg/dL    BUN 21 (H) 7.0 - 18.0 MG/DL    Creatinine 1.06 0.8 - 1.5 MG/DL    Est, Glom Filt Rate >60 >60 ml/min/1.73m2    Calcium 9.0 8.3 - 10.4 MG/DL    Total Bilirubin 0.2 0.2 - 1.1 MG/DL    ALT 31 13.0 - 61.0 U/L    AST 17 15 - 37 U/L    Alk Phosphatase 154 (H) 45.0 - 117.0 U/L    Total Protein 7.6 6.4 - 8.2 g/dL    Albumin 4.0 3.5 - 5.0 g/dL    Globulin 3.6 2.8 - 4.5 g/dL    Albumin/Globulin Ratio 1.1 0.4 - 1.6     POCT Glucose    Collection Time: 02/22/23 10:30 PM   Result Value Ref Range    POC Glucose 310 (H) 65 - 100 mg/dL    Performed by: Cecille        Signed:  Jeniffer Walton MD

## 2023-02-23 NOTE — PROGRESS NOTES
TRANSFER - IN REPORT:    Verbal report received from [facility] on Raissa Claudio  being received from [facility] ED for routine progression of patient care      Report consisted of patient's Situation, Background, Assessment and   Recommendations(SBAR). Information from the following report(s) Nurse Handoff Report was reviewed with the receiving nurse. Opportunity for questions and clarification was provided. Assessment completed upon patient's arrival to unit and care assumed.

## 2023-02-24 VITALS
HEART RATE: 76 BPM | TEMPERATURE: 98.2 F | SYSTOLIC BLOOD PRESSURE: 143 MMHG | RESPIRATION RATE: 20 BRPM | OXYGEN SATURATION: 94 % | DIASTOLIC BLOOD PRESSURE: 98 MMHG

## 2023-02-24 LAB
GLUCOSE BLD STRIP.AUTO-MCNC: 280 MG/DL (ref 65–100)
SERVICE CMNT-IMP: ABNORMAL

## 2023-02-24 PROCEDURE — 6370000000 HC RX 637 (ALT 250 FOR IP): Performed by: HOSPITALIST

## 2023-02-24 PROCEDURE — 82962 GLUCOSE BLOOD TEST: CPT

## 2023-02-24 PROCEDURE — 6370000000 HC RX 637 (ALT 250 FOR IP): Performed by: FAMILY MEDICINE

## 2023-02-24 PROCEDURE — 6360000002 HC RX W HCPCS: Performed by: FAMILY MEDICINE

## 2023-02-24 PROCEDURE — 2580000003 HC RX 258: Performed by: FAMILY MEDICINE

## 2023-02-24 RX ORDER — OSELTAMIVIR PHOSPHATE 75 MG/1
75 CAPSULE ORAL 2 TIMES DAILY
Qty: 6 CAPSULE | Refills: 0 | Status: SHIPPED | OUTPATIENT
Start: 2023-02-24 | End: 2023-02-27

## 2023-02-24 RX ADMIN — GUAIFENESIN SYRUP AND DEXTROMETHORPHAN 5 ML: 100; 10 SYRUP ORAL at 10:23

## 2023-02-24 RX ADMIN — SODIUM CHLORIDE, PRESERVATIVE FREE 10 ML: 5 INJECTION INTRAVENOUS at 08:17

## 2023-02-24 RX ADMIN — INSULIN GLARGINE 20 UNITS: 100 INJECTION, SOLUTION SUBCUTANEOUS at 08:32

## 2023-02-24 RX ADMIN — OSELTAMIVIR PHOSPHATE 75 MG: 75 CAPSULE ORAL at 08:09

## 2023-02-24 RX ADMIN — ENOXAPARIN SODIUM 30 MG: 100 INJECTION SUBCUTANEOUS at 08:09

## 2023-02-24 RX ADMIN — INSULIN LISPRO 4 UNITS: 100 INJECTION, SOLUTION INTRAVENOUS; SUBCUTANEOUS at 08:32

## 2023-02-24 RX ADMIN — OXYMETAZOLINE HCL 2 SPRAY: 0.05 SPRAY NASAL at 08:17

## 2023-02-24 NOTE — PROGRESS NOTES
Oxygen Qualifier       Room air: SpO2 with O2 and liter flow   Resting SpO2  91%     Ambulating SpO2 93        Completed by:     Linnette Salas RRT

## 2023-02-24 NOTE — PROGRESS NOTES
Discharge instructions given to patient at this time. Pt educated on follow-up with PCP, new medications, importance of staying hydrated and signs of worsening condition and when to seek medical help. Pt verbalized understanding.

## 2023-02-24 NOTE — DISCHARGE SUMMARY
Hospitalist Discharge Summary   Admit Date:  2023 10:02 PM   DC Note date: 2023  Name:  Tali Tobin   Age:  42 y.o. Sex:  male  :  1985   MRN:  [id number]   [place]:  [facility] [place] 2]  PCP:  Maggie Santos MD    Presenting Complaint: No chief complaint on file. Initial Admission Diagnosis: Hypoxia [R09.02]     Problem List for this Hospitalization (present on admission):    Principal Problem:    Hypoxia  Active Problems:    Uncontrolled diabetes mellitus with hyperglycemia ([address]    Depression with anxiety    Severe obesity (HCC)    DYLLAN (obstructive sleep apnea)    Essential hypertension  Resolved Problems:    * No resolved hospital problems. [facility] Course: This is a 42 y.o. male with medical history of hypertension, diabetes mellitus type 2, obstructive sleep apnea, presented to the ER in [place] with shortness of breath and myalgias. He was diagnosed with influenza type a 2 days ago. Oxygen saturations were initially normal at PCP office visit. COVID test negative. In the ER patient was found to be hypoxic with oxygen saturation of 87% on room air. Blood glucose elevated at 374. He was not able to take his medications for his diabetes due to nausea. Chest x-ray shows scattered atelectasis. Patient admitted to hospitalist service for further evaluation and management. During the course of hospitalization, patient was treated with Tamiflu. He was initially hypoxic. Respiratory status improved significantly sooner than anticipated. No fever no chills. Home oxygen screen was sent prior to discharge and patient did not need any oxygen. Patient is discharged home today in stable condition to follow-up with primary physician in 1 week. Disposition: Home today  Diet: ADULT DIET; Regular; 4 carb choices (60 gm/meal)  Code Status: Full Code    Follow Ups:   Follow-up Information     [facility] Follow up.     Contact information:  Lorne Calabrese Carlos [facility] [address]   [phone]                     Time spent in patient discharge and coordination 41 minutes. Follow up labs/diagnostics (ultimately defer to outpatient provider):  CBC, BMP in 3 to 5 days. Plan was discussed with the patient. All questions answered. Patient was stable at time of discharge. Instructions given to call a physician or return if any concerns.     Current Discharge Medication List        START taking these medications    Details   oseltamivir (TAMIFLU) 75 MG capsule Take 1 capsule by mouth 2 times daily for 6 doses  Qty: 6 capsule, Refills: 0           CONTINUE these medications which have NOT CHANGED    Details   oxymetazoline (AFRIN) 0.05 % nasal spray 2 sprays by Nasal route 2 times daily      metFORMIN (GLUCOPHAGE-XR) 500 MG extended release tablet Take 500 mg by mouth daily (with breakfast)      ondansetron (ZOFRAN-ODT) 4 MG disintegrating tablet Take 1 tablet by mouth 3 times daily as needed for Nausea or Vomiting  Qty: 21 tablet, Refills: 0      desvenlafaxine succinate (PRISTIQ) 50 MG TB24 extended release tablet       Lancets MISC by NOT APPLICABLE route 3 times daily           STOP taking these medications       benzonatate (TESSALON) 200 MG capsule Comments:   Reason for Stopping:         naproxen (NAPROSYN) 500 MG tablet Comments:   Reason for Stopping:         tadalafil (CIALIS) 20 MG tablet Comments:   Reason for Stopping:         rivaroxaban (XARELTO) 20 MG TABS tablet Comments:   Reason for Stopping:         atomoxetine (STRATTERA) 40 MG capsule Comments:   Reason for Stopping:         busPIRone (BUSPAR) 10 MG tablet Comments:   Reason for Stopping:         glipiZIDE (GLUCOTROL) 10 MG tablet Comments:   Reason for Stopping:         insulin glargine (LANTUS;BASAGLAR) 100 UNIT/ML injection pen Comments:   Reason for Stopping:         insulin glargine (LANTUS) 100 UNIT/ML injection vial Comments:   Reason for Stopping:         insulin lispro, 1 Unit Dial, 100 UNIT/ML SOPN Comments:   Reason for Stopping:         lurasidone (LATUDA) 120 MG tablet Comments:   Reason for Stopping:         sildenafil (VIAGRA) 100 MG tablet Comments:   Reason for Stopping:         zolpidem (AMBIEN) 5 MG tablet Comments:   Reason for Stopping:               Some medications may have been reported old/obsolete and marked \"stop taking\" by the system; in reality pt was already off these meds; defer to outpatient or prescribing providers. Procedures done this admission:  * No surgery found *    Consults this admission:  IP CONSULT TO RESPIRATORY CARE    Echocardiogram results:  No results found for this or any previous visit. Diagnostic Imaging/Tests:   XR CHEST 1 VIEW    Result Date: 2/22/2023  1. Slightly low lung volumes with scattered subsegmental atelectasis. This exam was interpreted by a board-certified, body-imaging fellowship trained radiologist from [address] [place] [address]. If there are any questions regarding this examination please feel free to contact a radiologist at [phone].  [address]  [facility] 2/22/2023 7:37:00 PM       Labs: Results:       BMP, Mg, Phos Recent Labs     02/22/23 1919 02/23/23  0530   * 133   K 4.4 4.1   CL 92* 100*   CO2 27 28   ANIONGAP 12* 5   BUN 21* 21   CREATININE 1.06 1.00   LABGLOM >60 >60   CALCIUM 9.0 8.4   GLUCOSE 374* 334*      CBC Recent Labs     02/22/23 1919 02/23/23  0530   WBC 5.6 3.9*   RBC 5.51 5.10   HGB 13.8 12.7*   HCT 42.1 39.9*   MCV 76.4* 78.2*   MCH 25.0* 24.9*   MCHC 32.8 31.8   RDW 13.9 14.4    233   MPV 9.1* 9.8   NRBC 0.00 0.00   SEGS 67 51   LYMPHOPCT 20 31   EOSRELPCT 3 3   MONOPCT 9 13*   BASOPCT 1 1   IMMGRAN 1 1   SEGSABS 3.8 2.0   LYMPHSABS 1.1 1.2   EOSABS 0.1 0.1   MONOSABS 0.5 0.5   BASOSABS 0.1 0.0   ABSIMMGRAN 0.0 0.0      LFT Recent Labs     02/22/23 1919   BILITOT 0.2   ALKPHOS 154*   AST 17   ALT 31   PROT 7.6   LABALBU 4.0   GLOB 3.6      Cardiac  Lab Results   Component Value Date/Time    TROPHS 7.9 02/23/2023 08:36 PM    TROPHS 3.4 10/09/2020 12:07 AM      Coags Lab Results   Component Value Date/Time    PROTIME 12.8 10/09/2020 12:07 AM    INR 0.9 10/09/2020 12:07 AM      A1c No results found for: LABA1C, EAG   Lipids No results found for: CHOL, LDLCALC, LABVLDL, HDL, CHOLHDLRATIO, TRIG   Thyroid  No results found for: Christobal Buff     Most Recent UA Lab Results   Component Value Date/Time    COLORU JUANA 04/21/2022 04:02 PM    SPECGRAV >=1.030 04/21/2022 04:02 PM    PROTEINU TRACE 04/21/2022 04:02 PM    GLUCOSEU Negative 04/21/2022 04:02 PM    KETUA Negative 04/21/2022 04:02 PM    BILIRUBINUR Negative 04/21/2022 04:02 PM    BLOODU Negative 04/21/2022 04:02 PM    NITRU Negative 04/21/2022 04:02 PM    LEUKOCYTESUR Negative 04/21/2022 04:02 PM    WBCUA 0-3 04/21/2022 04:02 PM    RBCUA 0 04/21/2022 04:02 PM    BACTERIA TRACE 04/21/2022 04:02 PM    MUCUS 0 04/21/2022 04:02 PM        No results for input(s): CULTURE in the last 720 hours. All Labs from Last 24 Hrs:  Recent Results (from the past 24 hour(s))   POCT Glucose    Collection Time: 02/23/23 12:52 PM   Result Value Ref Range    POC Glucose 254 (H) 65 - 100 mg/dL    Performed by: JASSON Zaragoza 38    EKG 12 Lead    Collection Time: 02/23/23  3:23 PM   Result Value Ref Range    Ventricular Rate 69 BPM    Atrial Rate 69 BPM    P-R Interval 166 ms    QRS Duration 92 ms    Q-T Interval 372 ms    QTc Calculation (Bazett) 398 ms    P Axis 50 degrees    R Axis -6 degrees    T Axis 41 degrees    Diagnosis Normal sinus rhythm    POCT Glucose    Collection Time: 02/23/23  5:02 PM   Result Value Ref Range    POC Glucose 200 (H) 65 - 100 mg/dL    Performed by: Uma    POCT Glucose    Collection Time: 02/23/23  7:57 PM   Result Value Ref Range    POC Glucose 243 (H) 65 - 100 mg/dL    Performed by:  SamplesEmilyPCT    Troponin    Collection Time: 02/23/23  8:36 PM   Result Value Ref Range    Troponin, High Sensitivity 7.9 0 - 14 pg/mL POCT Glucose    Collection Time: 02/24/23  7:43 AM   Result Value Ref Range    POC Glucose 280 (H) 65 - 100 mg/dL    Performed by: Uma        Allergies   Allergen Reactions    Promethazine Anaphylaxis     Makes me stop breathing\"    Metformin Diarrhea     Immunization History   Administered Date(s) Administered    COVID-19, MODERNA BLUE border, Primary or Immunocompromised, (age 12y+), IM, 100 mcg/0.5mL 03/27/2021, 04/24/2021       Recent Vital Data:  Patient Vitals for the past 24 hrs:   Temp Pulse Resp BP SpO2   02/24/23 1112 -- 76 20 (!) 143/98 94 %   02/24/23 0743 98.2 °F (36.8 °C) 73 20 (!) 151/92 96 %   02/24/23 0345 97.9 °F (36.6 °C) 73 22 135/87 92 %   02/23/23 2253 97.7 °F (36.5 °C) 80 20 (!) 148/84 94 %   02/23/23 1931 97.9 °F (36.6 °C) 72 20 129/85 95 %   02/23/23 1540 -- -- 18 -- --   02/23/23 1500 97.3 °F (36.3 °C) 71 18 123/88 93 %   02/23/23 1234 97.5 °F (36.4 °C) 70 21 125/89 95 %       Oxygen Therapy  SpO2: 94 %  Pulse Oximeter Device Mode: Intermittent  O2 Device: None (Room air)    Estimated body mass index is 40.69 kg/m² as calculated from the following:    Height as of an earlier encounter on 2/22/23: 6' (1.829 m). Weight as of an earlier encounter on 2/22/23: 300 lb (136.1 kg). No intake or output data in the 24 hours ending 02/24/23 1124      Physical Exam:    General:    Well nourished. Alert awake male, no overt distress  Head:  Normocephalic, atraumatic  Eyes:  Sclerae appear normal.  Pupils equally round. HENT:  Nares appear normal, no drainage. Moist mucous membranes  Neck:  No restricted ROM. Trachea midline  CV:   RRR. No m/r/g. No JVD  Lungs:   CTAB. No wheezing, rhonchi, or rales. Respirations even, unlabored  Abdomen:   Soft, nontender, nondistended. Extremities: Warm and dry. No cyanosis or clubbing. No edema. Skin:     No rashes. Normal coloration  Neuro:  CN II-XII grossly intact. Psych:  Normal mood and affect.     Signed:  Carly Hairston Ala MD    Part of this note may have been written by using a voice dictation software. The note has been proof read but may still contain some grammatical/other typographical errors.

## 2023-02-24 NOTE — PROGRESS NOTES
Patient is asleep in the bed. Patient is on RA with no s/s of distress. No events overnight. Patient has refused all lab draw this morning. Call bell is within reach. Preparing to give bedside report.

## 2023-02-24 NOTE — CARE COORDINATION
02/24/23 [facility] Discharge None   [facility] Information Provided? No   Mode of Transport at Discharge Other (see comment)   Confirm Follow Up Transport Family   Condition of Participation: Discharge Planning   The Patient and/or Patient Representative was provided with a Choice of Provider? Patient   The Patient and/Or Patient Representative agree with the Discharge Plan? Yes   Freedom of Choice list was provided with basic dialogue that supports the patient's individualized plan of care/goals, treatment preferences, and shares the quality data associated with the providers? Yes   Patient's family will transport patient home. There were no CM needs identified. Patient did not qualify or home 02.

## 2023-02-24 NOTE — PROGRESS NOTES
in patient's room to draw another Troponin level. Patient is refusing. RN spoke with patient about Troponin level trending. Patient states Radha Granger is going home today, the lab will be normal, he is refusing any more blood draws. Perfect Serve message sent to Dr Sylvester Resendiz for awareness.

## 2023-02-24 NOTE — PROGRESS NOTES
Patient alerted RN of chest pain that feels like \" he is being punched in the chest and it is radiating to left side and shoulder\" Patient appears asymptomatic while eating a sub sandwich. Message sent to Dr Catarina Lobo about patients complaints and EKG reading done earlier today when patient complained of chest pain. Will continue to watch Troponins. 1st being drawn now (2033) Patient is informed to alert RN of any changes in symptoms quickly. Will continue to monitor.

## 2023-02-24 NOTE — PROGRESS NOTES
Received report from [facility]. Patient is resting comfortably in the bed. Patient is on RA currently with no s/s of distress. Patient is alert and oriented times 4. Patient has NS running at 100mL/hr. No needs expressed at this time. Call bell is within reach and patient is instructed to call for assistance. Will continue to monitor.

## 2023-02-24 NOTE — PROGRESS NOTES
Patient is asleep in the bed. Patient is on RA with no s/s of distress. Call bell is within reach. Will continue to monitor.

## 2023-03-29 ENCOUNTER — OFFICE VISIT (OUTPATIENT)
Dept: UROLOGY | Age: 38
End: 2023-03-29
Payer: COMMERCIAL

## 2023-03-29 DIAGNOSIS — N52.9 ERECTILE DYSFUNCTION, UNSPECIFIED ERECTILE DYSFUNCTION TYPE: Primary | ICD-10-CM

## 2023-03-29 DIAGNOSIS — R68.82 DECREASED LIBIDO: ICD-10-CM

## 2023-03-29 LAB
BILIRUBIN, URINE, POC: NEGATIVE
BLOOD URINE, POC: NEGATIVE
GLUCOSE URINE, POC: 500
KETONES, URINE, POC: NEGATIVE
LEUKOCYTE ESTERASE, URINE, POC: NEGATIVE
NITRITE, URINE, POC: NEGATIVE
PH, URINE, POC: 5.5 (ref 4.6–8)
PROTEIN,URINE, POC: NEGATIVE
SPECIFIC GRAVITY, URINE, POC: 1.01 (ref 1–1.03)
URINALYSIS CLARITY, POC: NORMAL
URINALYSIS COLOR, POC: NORMAL
UROBILINOGEN, POC: NORMAL

## 2023-03-29 PROCEDURE — 81003 URINALYSIS AUTO W/O SCOPE: CPT | Performed by: NURSE PRACTITIONER

## 2023-03-29 PROCEDURE — 99214 OFFICE O/P EST MOD 30 MIN: CPT | Performed by: NURSE PRACTITIONER

## 2023-03-29 ASSESSMENT — ENCOUNTER SYMPTOMS
BACK PAIN: 0
NAUSEA: 0

## 2023-03-29 NOTE — PROGRESS NOTES
[address], [address]  [phone]          Rody Laird  : 1985    Chief Complaint   Patient presents with    Erectile Dysfunction          HPI     Rody Laird is a 42 y.o. male    Here today to discuss EGD. When I saw him last year he was complaining that the Viagra was no longer working. We dispensed Cialis 20 mg and he was to take it 2-3 times a week to see if this would improve his erections. He tells me that his erections have not improved and that he continues to have no interest in sex. He has significant history of diabetes and depression. The last hemoglobin A1c we have on record was 11.2. That was from . He did have a testosterone  total came back to be 312. Past Medical History:   Diagnosis Date    Arrhythmia     \" artery in heart goes into spams\"  no meds    Depression with anxiety 2017    Di[address] ([facility] [address]     type ll age 25, avgfbs 65, last A1C was 15 ,     Essential hypertension 2018    Hypercholesterolemia     Morbid obesity ([facility] [address]     bmi 15    Nausea & vomiting     Renal stone     Stroke [facility])      Past Surgical History:   Procedure Laterality Date    ADENOIDECTOMY  age13    APPENDECTOMY  1996    ORTHOPEDIC SURGERY  2015    right shoulder repair    ORTHOPEDIC SURGERY      left knee x 3  scopes    TOE AMPUTATION      TONSILLECTOMY  age 10     Current Outpatient Medications   Medication Sig Dispense Refill    Semaglutide (RYBELSUS PO) Take by mouth      Papav-Phentolamine-Alprostadil (SUPER TRI-MIX) 150- MG-MG-MCG SOLR 1 each by IntraCAVernosal route as needed (erectile dysfunction) PGE1: 10 mcg/ml; Papaverine: 30 mg/ml, Phentolamine: 1 mg/ml;  + all other supplies as needed.  1 each 5    oxymetazoline (AFRIN) 0.05 % nasal spray 2 sprays by Nasal route 2 times daily      metFORMIN (GLUCOPHAGE-XR) 500 MG extended release tablet Take 500 mg by mouth daily (with breakfast)      ondansetron

## 2023-03-30 DIAGNOSIS — N52.9 ERECTILE DYSFUNCTION, UNSPECIFIED ERECTILE DYSFUNCTION TYPE: ICD-10-CM

## 2023-03-30 DIAGNOSIS — R68.82 DECREASED LIBIDO: ICD-10-CM

## 2023-04-03 LAB
TESTOST FREE SERPL-MCNC: 6.6 PG/ML (ref 8.7–25.1)
TESTOST SERPL-MCNC: 247 NG/DL (ref 264–916)

## 2023-04-25 ENCOUNTER — HOSPITAL ENCOUNTER (EMERGENCY)
Age: 38
Discharge: HOME | End: 2023-04-25
Attending: EMERGENCY MEDICINE
Payer: COMMERCIAL

## 2023-04-25 VITALS
WEIGHT: 290 LBS | HEIGHT: 72 IN | RESPIRATION RATE: 17 BRPM | TEMPERATURE: 98.4 F | OXYGEN SATURATION: 96 % | SYSTOLIC BLOOD PRESSURE: 144 MMHG | DIASTOLIC BLOOD PRESSURE: 94 MMHG | BODY MASS INDEX: 39.28 KG/M2 | HEART RATE: 75 BPM

## 2023-04-25 DIAGNOSIS — R73.9 HYPERGLYCEMIA: Primary | ICD-10-CM

## 2023-04-25 LAB
ALBUMIN SERPL-MCNC: 3.2 G/DL (ref 3.5–5)
ALBUMIN/GLOB SERPL: 0.7 (ref 0.4–1.6)
ALP SERPL-CCNC: 151 U/L (ref 50–136)
ALT SERPL-CCNC: 34 U/L (ref 12–65)
ANION GAP SERPL CALC-SCNC: 4 MMOL/L (ref 2–11)
AST SERPL-CCNC: 16 U/L (ref 15–37)
BASOPHILS # BLD: 0 K/UL (ref 0–0.2)
BASOPHILS NFR BLD: 1 % (ref 0–2)
BILIRUB SERPL-MCNC: 0.5 MG/DL (ref 0.2–1.1)
BUN SERPL-MCNC: 14 MG/DL (ref 6–23)
CALCIUM SERPL-MCNC: 9 MG/DL (ref 8.3–10.4)
CHLORIDE SERPL-SCNC: 101 MMOL/L (ref 101–110)
CO2 SERPL-SCNC: 28 MMOL/L (ref 21–32)
CREAT SERPL-MCNC: 1.06 MG/DL (ref 0.8–1.5)
DIFFERENTIAL METHOD BLD: ABNORMAL
EOSINOPHIL # BLD: 0.2 K/UL (ref 0–0.8)
EOSINOPHIL NFR BLD: 3 % (ref 0.5–7.8)
ERYTHROCYTE [DISTWIDTH] IN BLOOD BY AUTOMATED COUNT: 14.2 % (ref 11.9–14.6)
GLOBULIN SER CALC-MCNC: 4.3 G/DL (ref 2.8–4.5)
GLUCOSE BLD STRIP.AUTO-MCNC: 278 MG/DL (ref 65–100)
GLUCOSE BLD STRIP.AUTO-MCNC: 321 MG/DL (ref 65–100)
GLUCOSE BLD STRIP.AUTO-MCNC: 384 MG/DL (ref 65–100)
GLUCOSE SERPL-MCNC: 385 MG/DL (ref 65–100)
HCT VFR BLD AUTO: 39.2 % (ref 41.1–50.3)
HGB BLD-MCNC: 12.6 G/DL (ref 13.6–17.2)
IMM GRANULOCYTES # BLD AUTO: 0 K/UL (ref 0–0.5)
IMM GRANULOCYTES NFR BLD AUTO: 1 % (ref 0–5)
LYMPHOCYTES # BLD: 1.2 K/UL (ref 0.5–4.6)
LYMPHOCYTES NFR BLD: 21 % (ref 13–44)
MCH RBC QN AUTO: 25 PG (ref 26.1–32.9)
MCHC RBC AUTO-ENTMCNC: 32.1 G/DL (ref 31.4–35)
MCV RBC AUTO: 77.6 FL (ref 82–102)
MONOCYTES # BLD: 0.3 K/UL (ref 0.1–1.3)
MONOCYTES NFR BLD: 5 % (ref 4–12)
NEUTS SEG # BLD: 4 K/UL (ref 1.7–8.2)
NEUTS SEG NFR BLD: 70 % (ref 43–78)
NRBC # BLD: 0 K/UL (ref 0–0.2)
PLATELET # BLD AUTO: 272 K/UL (ref 150–450)
PMV BLD AUTO: 9.8 FL (ref 9.4–12.3)
POTASSIUM SERPL-SCNC: 3.9 MMOL/L (ref 3.5–5.1)
PROT SERPL-MCNC: 7.5 G/DL (ref 6.3–8.2)
RBC # BLD AUTO: 5.05 M/UL (ref 4.23–5.6)
SERVICE CMNT-IMP: ABNORMAL
SODIUM SERPL-SCNC: 133 MMOL/L (ref 133–143)
WBC # BLD AUTO: 5.7 K/UL (ref 4.3–11.1)

## 2023-04-25 PROCEDURE — 82962 GLUCOSE BLOOD TEST: CPT

## 2023-04-25 PROCEDURE — 80053 COMPREHEN METABOLIC PANEL: CPT

## 2023-04-25 PROCEDURE — 99284 EMERGENCY DEPT VISIT MOD MDM: CPT

## 2023-04-25 PROCEDURE — 96361 HYDRATE IV INFUSION ADD-ON: CPT

## 2023-04-25 PROCEDURE — 85025 COMPLETE CBC W/AUTO DIFF WBC: CPT

## 2023-04-25 PROCEDURE — 96360 HYDRATION IV INFUSION INIT: CPT

## 2023-04-25 PROCEDURE — 2580000003 HC RX 258: Performed by: EMERGENCY MEDICINE

## 2023-04-25 PROCEDURE — 6370000000 HC RX 637 (ALT 250 FOR IP): Performed by: EMERGENCY MEDICINE

## 2023-04-25 RX ORDER — 0.9 % SODIUM CHLORIDE 0.9 %
1000 INTRAVENOUS SOLUTION INTRAVENOUS
Status: COMPLETED | OUTPATIENT
Start: 2023-04-25 | End: 2023-04-25

## 2023-04-25 RX ADMIN — SODIUM CHLORIDE 1000 ML: 9 INJECTION, SOLUTION INTRAVENOUS at 10:18

## 2023-04-25 RX ADMIN — INSULIN HUMAN 10 UNITS: 100 INJECTION, SOLUTION PARENTERAL at 12:29

## 2023-04-25 ASSESSMENT — PAIN SCALES - GENERAL
PAINLEVEL_OUTOF10: 0
PAINLEVEL_OUTOF10: 0

## 2023-04-25 ASSESSMENT — ENCOUNTER SYMPTOMS
EYE DISCHARGE: 0
CONSTIPATION: 0
NAUSEA: 1
PHOTOPHOBIA: 0
DIARRHEA: 0
EYE REDNESS: 0
BACK PAIN: 0
VOMITING: 1
SHORTNESS OF BREATH: 0
ABDOMINAL PAIN: 0

## 2023-04-25 ASSESSMENT — LIFESTYLE VARIABLES
HOW MANY STANDARD DRINKS CONTAINING ALCOHOL DO YOU HAVE ON A TYPICAL DAY: 1 OR 2
HOW OFTEN DO YOU HAVE A DRINK CONTAINING ALCOHOL: MONTHLY OR LESS

## 2023-04-25 ASSESSMENT — PAIN - FUNCTIONAL ASSESSMENT
PAIN_FUNCTIONAL_ASSESSMENT: 0-10
PAIN_FUNCTIONAL_ASSESSMENT: 0-10

## 2023-04-25 NOTE — PROGRESS NOTES
Estephania Rodriguez (: 1985) presents today to establish care and for other conditions. He has past hx of type 2 diabetes, anxiety, depression, ADD, ED. Currently only seeing urologist.     Last a1c I can see is 11.2 from 2022. Went to ER yesterday- ER did not put him on anything for blood sugar. Was on dexcom- does not have this anymore. Diagnosed 10 years ago. He does not think he has high blood pressure- it is controlled today. Does not smoke. He does drink alcohol- 2 a month. Admits to marijuana use for neuropathy. Having GERD symptoms. Has tried OTC prilosec with no relief- will wake up at 2 AM and take zantac. Has not see GI. Has not had EGD or colonoscopy. Also having left shoulder pain- saw ortho and they thought it was frozen shoulder. Pain started 2022. Pain is worse. Has done PT 3 times. Anxiety, depression. Has tried lexapro, wellbutrin, zoloft.      Chief Complaint   Patient presents with    New Patient     Establish care and Diabetes control and heart burn        Patient Active Problem List   Diagnosis    Coronary artery disease involving native coronary artery with angina pectoris with documented spasm ([address]    Diabetic polyneuropathy associated with type 2 diabetes mellitus ([facility]    Hypercholesterolemia    Depression with anxiety    Severe obesity (HCC)    DYLLAN (obstructive sleep apnea)    Essential hypertension    Erectile dysfunction due to diseases classified elsewhere    Deep venous thrombosis (HCC)    Hypoxia    Uncontrolled diabetes mellitus with hyperglycemia (HCC)    Gastroesophageal reflux disease without esophagitis    History of transient ischemic attack (TIA)    Neuropathy    Suicide attempt by benzodiazepine overdose [facility])     Past Medical History:   Diagnosis Date    Arrhythmia     \" artery in heart goes into spams\"  no meds    Depression with anxiety 2017    Diabetes

## 2023-04-25 NOTE — ED NOTES
I have reviewed discharge instructions with the patient. The patient verbalized understanding. Patient left ED via Discharge Method: ambulatory to Home with self. Opportunity for questions and clarification provided. Patient given 0 scripts. To continue your aftercare when you leave the hospital, you may receive an automated call from our care team to check in on how you are doing. This is a free service and part of our promise to provide the best care and service to meet your aftercare needs.  If you have questions, or wish to unsubscribe from this service please call [phone]. Thank you for Choosing our [facility] Emergency Department.         Senia Gonzalez RN  04/25/23 1394

## 2023-04-25 NOTE — ED PROVIDER NOTES
Emergency Department Provider Note       PCP: Lyudmila Damon MD   Age: 42 y.o. Sex: male     DISPOSITION Decision To Discharge 04/25/2023 01:37:07 PM       ICD-10-CM    1. Hyperglycemia  R73.9           Medical Decision Making     Complexity of Problems Addressed:  1 or more chronic illnesses that pose a threat to life or bodily function. Data Reviewed and Analyzed:  Category 1:   I independently ordered and reviewed each unique test.  I reviewed external records: ED visit note from an outside group. I reviewed external records: provider visit note from outside specialist.       Category 2:       Category 3: Discussion of management or test interpretation. 38-year-old male with history of diabetes complains of lightheadedness, increased blood sugar, and visual blurring starting this morning. Patient denies any UTI or URI symptoms. Lab work other than hyperglycemia fairly unremarkable. Patient was hydrated with normal saline, given insulin for his hyperglycemia, and was feeling much improved at time of discharge. Patient is scheduled to follow-up with his new family doctor tomorrow. Risk of Complications and/or Morbidity of Patient Management:  Chronic medical problems impacting care include diabetes. History      Shaggy Calix is a 42 y.o. male who presents to the Emergency Department with chief complaint of    Chief Complaint   Patient presents with    Blood Sugar Problem      38-year-old male with history of type 2 diabetes presents the emergency department complaining of some visual blurring, body aches, and nausea vomiting x1 starting today. According the patient, his diabetic regimen was changed approximately 2 months ago and he was taken off all insulin products and just placed on extended release metformin and Rybelsus p.o. he states since that time he is felt terribly, but does not normally check his blood sugars at home.   Patient denies any change in urinary frequency, fever or

## 2023-04-25 NOTE — ED TRIAGE NOTES
Here because of high blood sugar. Started seeing a new doctor and she took him off all of his insulin, and discontinued his dexcom. And put me on oral medications. \"I am tired, blurry vision, and worn out. \"

## 2023-04-26 ENCOUNTER — OFFICE VISIT (OUTPATIENT)
Dept: FAMILY MEDICINE CLINIC | Facility: CLINIC | Age: 38
End: 2023-04-26
Payer: COMMERCIAL

## 2023-04-26 ENCOUNTER — PATIENT MESSAGE (OUTPATIENT)
Dept: FAMILY MEDICINE CLINIC | Facility: CLINIC | Age: 38
End: 2023-04-26

## 2023-04-26 ENCOUNTER — TELEPHONE (OUTPATIENT)
Dept: FAMILY MEDICINE CLINIC | Facility: CLINIC | Age: 38
End: 2023-04-26

## 2023-04-26 VITALS
RESPIRATION RATE: 16 BRPM | DIASTOLIC BLOOD PRESSURE: 80 MMHG | TEMPERATURE: 97.2 F | WEIGHT: 298 LBS | BODY MASS INDEX: 40.36 KG/M2 | SYSTOLIC BLOOD PRESSURE: 120 MMHG | OXYGEN SATURATION: 97 % | HEIGHT: 72 IN | HEART RATE: 71 BPM

## 2023-04-26 DIAGNOSIS — M25.512 CHRONIC LEFT SHOULDER PAIN: Primary | ICD-10-CM

## 2023-04-26 DIAGNOSIS — E11.65 UNCONTROLLED TYPE 2 DIABETES MELLITUS WITH HYPERGLYCEMIA (HCC): ICD-10-CM

## 2023-04-26 DIAGNOSIS — E11.42 DIABETIC POLYNEUROPATHY ASSOCIATED WITH TYPE 2 DIABETES MELLITUS (HCC): ICD-10-CM

## 2023-04-26 DIAGNOSIS — E11.65 UNCONTROLLED TYPE 2 DIABETES MELLITUS WITH HYPERGLYCEMIA (HCC): Primary | ICD-10-CM

## 2023-04-26 DIAGNOSIS — F32.A ANXIETY AND DEPRESSION: ICD-10-CM

## 2023-04-26 DIAGNOSIS — Z76.89 ENCOUNTER TO ESTABLISH CARE: ICD-10-CM

## 2023-04-26 DIAGNOSIS — E11.42 DIABETIC POLYNEUROPATHY ASSOCIATED WITH TYPE 2 DIABETES MELLITUS (HCC): Primary | ICD-10-CM

## 2023-04-26 DIAGNOSIS — F41.9 ANXIETY AND DEPRESSION: ICD-10-CM

## 2023-04-26 DIAGNOSIS — G89.29 CHRONIC LEFT SHOULDER PAIN: Primary | ICD-10-CM

## 2023-04-26 DIAGNOSIS — K21.9 GASTROESOPHAGEAL REFLUX DISEASE, UNSPECIFIED WHETHER ESOPHAGITIS PRESENT: ICD-10-CM

## 2023-04-26 DIAGNOSIS — E78.00 HYPERCHOLESTEROLEMIA: ICD-10-CM

## 2023-04-26 PROBLEM — G62.9 NEUROPATHY: Status: ACTIVE | Noted: 2023-04-26

## 2023-04-26 PROBLEM — Z86.73 HISTORY OF TRANSIENT ISCHEMIC ATTACK (TIA): Status: ACTIVE | Noted: 2018-09-02

## 2023-04-26 PROBLEM — T42.4X2A SUICIDE ATTEMPT BY BENZODIAZEPINE OVERDOSE (HCC): Status: ACTIVE | Noted: 2020-10-26

## 2023-04-26 PROCEDURE — 3074F SYST BP LT 130 MM HG: CPT | Performed by: NURSE PRACTITIONER

## 2023-04-26 PROCEDURE — 99204 OFFICE O/P NEW MOD 45 MIN: CPT | Performed by: NURSE PRACTITIONER

## 2023-04-26 PROCEDURE — 3079F DIAST BP 80-89 MM HG: CPT | Performed by: NURSE PRACTITIONER

## 2023-04-26 RX ORDER — TADALAFIL 20 MG/1
20 TABLET ORAL PRN
COMMUNITY
Start: 2022-02-22

## 2023-04-26 RX ORDER — ROSUVASTATIN CALCIUM 5 MG/1
5 TABLET, COATED ORAL DAILY
Qty: 90 TABLET | Refills: 1 | Status: SHIPPED | OUTPATIENT
Start: 2023-04-26

## 2023-04-26 RX ORDER — SEMAGLUTIDE 0.68 MG/ML
INJECTION, SOLUTION SUBCUTANEOUS
Qty: 9 ML | Refills: 3 | Status: SHIPPED | OUTPATIENT
Start: 2023-04-26

## 2023-04-26 RX ORDER — NITROGLYCERIN 0.4 MG/1
0.4 TABLET SUBLINGUAL EVERY 5 MIN PRN
COMMUNITY
Start: 2021-07-09

## 2023-04-26 RX ORDER — INSULIN ASPART 100 [IU]/ML
35 INJECTION, SOLUTION INTRAVENOUS; SUBCUTANEOUS
COMMUNITY
End: 2023-04-26 | Stop reason: SDUPTHER

## 2023-04-26 RX ORDER — INSULIN ASPART 100 [IU]/ML
35 INJECTION, SOLUTION INTRAVENOUS; SUBCUTANEOUS
Qty: 189 ML | Refills: 0 | Status: SHIPPED | OUTPATIENT
Start: 2023-04-26 | End: 2023-04-26

## 2023-04-26 RX ORDER — ATORVASTATIN CALCIUM 80 MG/1
80 TABLET, FILM COATED ORAL DAILY
Qty: 30 TABLET | Refills: 36 | COMMUNITY
Start: 2020-08-26 | End: 2023-08-17

## 2023-04-26 RX ORDER — OMEPRAZOLE 40 MG/1
40 CAPSULE, DELAYED RELEASE ORAL 2 TIMES DAILY
Qty: 60 CAPSULE | Refills: 1 | Status: SHIPPED | OUTPATIENT
Start: 2023-04-26

## 2023-04-26 RX ORDER — PROCHLORPERAZINE 25 MG/1
SUPPOSITORY RECTAL
Qty: 9 EACH | Refills: 3 | Status: SHIPPED | OUTPATIENT
Start: 2023-04-26

## 2023-04-26 RX ORDER — CITALOPRAM 10 MG/1
10 TABLET ORAL DAILY
Qty: 30 TABLET | Refills: 1 | Status: SHIPPED | OUTPATIENT
Start: 2023-04-26

## 2023-04-26 RX ORDER — OMEPRAZOLE 40 MG/1
40 CAPSULE, DELAYED RELEASE ORAL DAILY
COMMUNITY
Start: 2020-08-24 | End: 2023-04-26 | Stop reason: SDUPTHER

## 2023-04-26 RX ORDER — INSULIN LISPRO 100 [IU]/ML
INJECTION, SOLUTION INTRAVENOUS; SUBCUTANEOUS
Qty: 12 ADJUSTABLE DOSE PRE-FILLED PEN SYRINGE | Refills: 0 | Status: SHIPPED | OUTPATIENT
Start: 2023-04-26

## 2023-04-26 RX ORDER — PROCHLORPERAZINE 25 MG/1
SUPPOSITORY RECTAL
Qty: 1 EACH | Refills: 3 | Status: SHIPPED | OUTPATIENT
Start: 2023-04-26

## 2023-04-26 SDOH — ECONOMIC STABILITY: HOUSING INSECURITY
IN THE LAST 12 MONTHS, WAS THERE A TIME WHEN YOU DID NOT HAVE A STEADY PLACE TO SLEEP OR SLEPT IN A SHELTER (INCLUDING NOW)?: NO

## 2023-04-26 SDOH — ECONOMIC STABILITY: FOOD INSECURITY: WITHIN THE PAST 12 MONTHS, YOU WORRIED THAT YOUR FOOD WOULD RUN OUT BEFORE YOU GOT MONEY TO BUY MORE.: NEVER TRUE

## 2023-04-26 SDOH — ECONOMIC STABILITY: FOOD INSECURITY: WITHIN THE PAST 12 MONTHS, THE FOOD YOU BOUGHT JUST DIDN'T LAST AND YOU DIDN'T HAVE MONEY TO GET MORE.: NEVER TRUE

## 2023-04-26 SDOH — ECONOMIC STABILITY: INCOME INSECURITY: HOW HARD IS IT FOR YOU TO PAY FOR THE VERY BASICS LIKE FOOD, HOUSING, MEDICAL CARE, AND HEATING?: NOT VERY HARD

## 2023-04-26 ASSESSMENT — PATIENT HEALTH QUESTIONNAIRE - PHQ9
2. FEELING DOWN, DEPRESSED OR HOPELESS: 0
SUM OF ALL RESPONSES TO PHQ QUESTIONS 1-9: 0
SUM OF ALL RESPONSES TO PHQ QUESTIONS 1-9: 0
1. LITTLE INTEREST OR PLEASURE IN DOING THINGS: 0
SUM OF ALL RESPONSES TO PHQ QUESTIONS 1-9: 0
SUM OF ALL RESPONSES TO PHQ QUESTIONS 1-9: 0
SUM OF ALL RESPONSES TO PHQ9 QUESTIONS 1 & 2: 0

## 2023-04-26 NOTE — TELEPHONE ENCOUNTER
Pt called Insurance will not cover Novolog can you please send Humalog 35 units into the skin 3 times daily (before Meals)  [email or web address] [facility] [place] [facility] Elvie Novak [phone]   [address] [place] [address] [address]   Phone:  [phone]  Fax:  [phone]

## 2023-04-27 LAB
CHOLEST SERPL-MCNC: 171 MG/DL
EST. AVERAGE GLUCOSE BLD GHB EST-MCNC: 298 MG/DL
HBA1C MFR BLD: 12 % (ref 4.8–5.6)
HDLC SERPL-MCNC: 35 MG/DL (ref 40–60)
HDLC SERPL: 4.9
LDLC SERPL CALC-MCNC: 106.8 MG/DL
TRIGL SERPL-MCNC: 146 MG/DL (ref 35–150)
TSH, 3RD GENERATION: 1.75 UIU/ML (ref 0.36–3.74)
VLDLC SERPL CALC-MCNC: 29.2 MG/DL (ref 6–23)

## 2023-05-02 NOTE — TELEPHONE ENCOUNTER
From: [facility]  To:  Richard Lizama  Sent: 4/26/2023 3:36 PM EDT  Subject: Arti Clarknltonny I saw [address] this morning and the insulin that she sent in is not covered under my insurance I need something different sent in

## 2023-05-04 ENCOUNTER — TELEPHONE (OUTPATIENT)
Dept: FAMILY MEDICINE CLINIC | Facility: CLINIC | Age: 38
End: 2023-05-04

## 2023-05-04 DIAGNOSIS — E11.65 UNCONTROLLED TYPE 2 DIABETES MELLITUS WITH HYPERGLYCEMIA (HCC): ICD-10-CM

## 2023-05-11 ENCOUNTER — OFFICE VISIT (OUTPATIENT)
Dept: ORTHOPEDIC SURGERY | Age: 38
End: 2023-05-11
Payer: COMMERCIAL

## 2023-05-11 DIAGNOSIS — M25.512 BILATERAL SHOULDER PAIN, UNSPECIFIED CHRONICITY: Primary | ICD-10-CM

## 2023-05-11 DIAGNOSIS — M25.511 BILATERAL SHOULDER PAIN, UNSPECIFIED CHRONICITY: Primary | ICD-10-CM

## 2023-05-11 DIAGNOSIS — F40.240 CLAUSTROPHOBIA: Primary | ICD-10-CM

## 2023-05-11 DIAGNOSIS — M75.02 ADHESIVE CAPSULITIS OF LEFT SHOULDER: ICD-10-CM

## 2023-05-11 PROCEDURE — 99204 OFFICE O/P NEW MOD 45 MIN: CPT | Performed by: PHYSICIAN ASSISTANT

## 2023-05-11 RX ORDER — ALPRAZOLAM 0.5 MG/1
0.5 TABLET ORAL ONCE AS NEEDED
Qty: 2 TABLET | Refills: 0 | Status: SHIPPED | OUTPATIENT
Start: 2023-05-11 | End: 2023-05-22

## 2023-05-11 NOTE — PROGRESS NOTES
Name: Meghan Mcgill  YOB: 1985  Gender: male  MRN: [id number]    CC:   Chief Complaint   Patient presents with    Shoulder Pain     Bilateral shoulder pain   , bilateral shoulder(s) pain, stiffness    HPI:  This patient presents with a six month history of Right and left shoulder pain and limited motion. Patient denies mechanism of injury. Notes left is greater than right. Notes it has been progressively worsening. He is pretty miserable. Notes pain located in the anterior, posterior and lateral aspect of the shoulder. Does note some crunching. Notes interference with sleep. Unable to lift the shoulder overhead. Is previously been seen at [facility] and was told that he had frozen shoulder. He states he did have a shoulder scope on one of his shoulders but is unsure which in 2014.     Allergies   Allergen Reactions    Promethazine Anaphylaxis     Makes me stop breathing\"    Metformin Diarrhea     Past Medical History:   Diagnosis Date    Arrhythmia     \" artery in heart goes into spams\"  no meds    Depression with anxiety 4/11/2017    Diabetes ([facility] [address]     type ll age 25, avgfbs 320, last A1C was 17 ,     Essential hypertension 9/2/2018    Gastroesophageal reflux disease without esophagitis 4/26/2023    Hypercholesterolemia     Morbid obesity (HCC)     bmi 15    Nausea & vomiting     Renal stone     Stroke [facility])      Past Surgical History:   Procedure Laterality Date    ADENOIDECTOMY  age15    [facility] SURGERY  2015    right shoulder repair    ORTHOPEDIC SURGERY      left knee x 3  scopes    TOE AMPUTATION      TONSILLECTOMY  age 10     Family History   Problem Relation Age of Onset    Kidney Disease Mother     Diabetes Father     Kidney Disease Father      Social History     Socioeconomic History    Marital status:      Spouse name: Not on file    Number of children: Not on file    Years of education: Not on file    Highest education level: Not on file

## 2023-05-12 ENCOUNTER — TELEPHONE (OUTPATIENT)
Dept: UROLOGY | Age: 38
End: 2023-05-12

## 2023-05-17 DIAGNOSIS — R68.82 DECREASED LIBIDO: ICD-10-CM

## 2023-05-17 DIAGNOSIS — N52.9 ERECTILE DYSFUNCTION, UNSPECIFIED ERECTILE DYSFUNCTION TYPE: ICD-10-CM

## 2023-05-17 RX ORDER — TESTOSTERONE ENANTHATE 100 MG/.5ML
100 INJECTION SUBCUTANEOUS
Qty: 4 ADJUSTABLE DOSE PRE-FILLED PEN SYRINGE | Refills: 5 | Status: SHIPPED | OUTPATIENT
Start: 2023-05-17

## 2023-05-21 DIAGNOSIS — E11.42 DIABETIC POLYNEUROPATHY ASSOCIATED WITH TYPE 2 DIABETES MELLITUS (HCC): ICD-10-CM

## 2023-05-22 RX ORDER — INSULIN LISPRO 100 [IU]/ML
INJECTION, SOLUTION INTRAVENOUS; SUBCUTANEOUS
Qty: 36 ML | Refills: 0 | OUTPATIENT
Start: 2023-05-22

## 2023-05-23 ENCOUNTER — OFFICE VISIT (OUTPATIENT)
Dept: ORTHOPEDIC SURGERY | Age: 38
End: 2023-05-23
Payer: COMMERCIAL

## 2023-05-23 DIAGNOSIS — M25.512 BILATERAL SHOULDER PAIN, UNSPECIFIED CHRONICITY: ICD-10-CM

## 2023-05-23 DIAGNOSIS — E11.42 DIABETIC POLYNEUROPATHY ASSOCIATED WITH TYPE 2 DIABETES MELLITUS (HCC): ICD-10-CM

## 2023-05-23 DIAGNOSIS — M75.02 ADHESIVE CAPSULITIS OF LEFT SHOULDER: Primary | ICD-10-CM

## 2023-05-23 DIAGNOSIS — M25.511 BILATERAL SHOULDER PAIN, UNSPECIFIED CHRONICITY: ICD-10-CM

## 2023-05-23 PROCEDURE — 99215 OFFICE O/P EST HI 40 MIN: CPT | Performed by: ORTHOPAEDIC SURGERY

## 2023-05-23 PROCEDURE — 3046F HEMOGLOBIN A1C LEVEL >9.0%: CPT | Performed by: ORTHOPAEDIC SURGERY

## 2023-05-23 NOTE — PROGRESS NOTES
History    Not on file   Tobacco Use    Smoking status: Never    Smokeless tobacco: Never   Substance and Sexual Activity    Alcohol use: Yes     Comment: rarely    Drug use: Yes     Types: Marijuana Melissa Garzon    Sexual activity: Not on file   Other Topics Concern    Not on file   Social History Narrative    Not on file     Social Determinants of Health     Financial Resource Strain: Low Risk     Difficulty of Paying Living Expenses: Not very hard   Food Insecurity: No Food Insecurity    Worried About Running Out of Food in the Last Year: Never true    Ran Out of Food in the Last Year: Never true   Transportation Needs: Unknown    Lack of Transportation (Medical): Not on file    Lack of Transportation (Non-Medical): No   Physical Activity: Not on file   Stress: Not on file   Social Connections: Not on file   Intimate Partner Violence: Not on file   Housing Stability: Unknown    Unable to Pay for Housing in the Last Year: Not on file    Number of Places Lived in the Last Year: Not on file    Unstable Housing in the Last Year: No        No flowsheet data found. Review of Systems  Non-contributory    PE:      Cervical spine: No tenderness. Good active motion. Negative Spurling's maneuver. SHOULDER   Left (Involved) Right   Skin Intact Intact   Radial Pulses 2+ Symmetrical 2+ Symmetrical   Deformity None Normal   Myotomes Normal Normal   Dermatomes  Normal Normal    ROM Flexion 80 abduction 65 and external rotation 0 Flexion 115 abduction 80 external rotation 15   Strength 5-5 T No weakness   Atrophy None noted None noted   Effusion/Swelling  None noted None noted   Palpation Anterior TTP No tenderness   Bicep Tendon Rupture  Negative Negative signs   Bear Hug, Belly Press Negative, negative Negative   Crossed Arm Adduction Test negative    Instability/Ant.  Apprehension Test None noted None noted   Impingement limited Negative      X-rays:   Grashey, axillary and outlet views of the left and right shoulder that were

## 2023-05-24 ENCOUNTER — TELEPHONE (OUTPATIENT)
Dept: FAMILY MEDICINE CLINIC | Facility: CLINIC | Age: 38
End: 2023-05-24

## 2023-05-24 RX ORDER — INSULIN LISPRO 100 [IU]/ML
INJECTION, SOLUTION INTRAVENOUS; SUBCUTANEOUS
Qty: 20 ADJUSTABLE DOSE PRE-FILLED PEN SYRINGE | Refills: 2 | Status: SHIPPED | OUTPATIENT
Start: 2023-05-24

## 2023-05-25 DIAGNOSIS — E11.42 DIABETIC POLYNEUROPATHY ASSOCIATED WITH TYPE 2 DIABETES MELLITUS (HCC): ICD-10-CM

## 2023-05-25 DIAGNOSIS — M75.02 ADHESIVE CAPSULITIS OF LEFT SHOULDER: ICD-10-CM

## 2023-05-28 LAB — FRUCTOSAMINE SERPL-SCNC: 283 UMOL/L (ref 0–285)

## 2023-06-01 DIAGNOSIS — M25.511 BILATERAL SHOULDER PAIN, UNSPECIFIED CHRONICITY: ICD-10-CM

## 2023-06-01 DIAGNOSIS — M75.02 ADHESIVE CAPSULITIS OF LEFT SHOULDER: Primary | ICD-10-CM

## 2023-06-01 DIAGNOSIS — M25.512 BILATERAL SHOULDER PAIN, UNSPECIFIED CHRONICITY: ICD-10-CM

## 2023-06-02 DIAGNOSIS — M25.512 BILATERAL SHOULDER PAIN, UNSPECIFIED CHRONICITY: ICD-10-CM

## 2023-06-02 DIAGNOSIS — M25.511 BILATERAL SHOULDER PAIN, UNSPECIFIED CHRONICITY: ICD-10-CM

## 2023-06-02 DIAGNOSIS — M75.02 ADHESIVE CAPSULITIS OF LEFT SHOULDER: Primary | ICD-10-CM

## 2023-06-06 RX ORDER — ATORVASTATIN CALCIUM 80 MG/1
80 TABLET, FILM COATED ORAL DAILY
COMMUNITY

## 2023-06-06 NOTE — PERIOP NOTE
Patient verified name and . Order for consent not found in EHR; patient verifies procedure. Type 1B surgery, Phone assessment complete. Orders not received. Labs per surgeon: none  Labs per anesthesia protocol: POC glucose    Chart flagged to have anesthesia review- Last Hgb A1c 12.0 on 23    Patient answered medical/surgical history questions at their best of ability. All prior to admission medications documented in EPIC. Patient instructed to take the following medications the day of surgery according to anesthesia guidelines with a small sip of water: Lipitor, Cymbalta, and Prilosec. Hold all vitamins 7 days prior to surgery and NSAIDS 5 days prior to surgery. Prescription meds to hold:Cialis. Patient instructed on the following:    > Arrive at [facility], time of arrival to be called the day before by 1700  > NPO after midnight, unless otherwise indicated, including gum, mints, and ice chips  > Responsible adult must drive patient to the hospital, stay during surgery, and patient will need supervision 24 hours after anesthesia  > Use antibacterial Soap in shower the night before surgery and on the morning of surgery  > All piercings must be removed prior to arrival.    > Leave all valuables (money and jewelry) at home but bring insurance card and ID on DOS.   > You may be required to pay a deductible or co-pay on the day of your procedure. You can pre-pay by calling [phone] if your surgery is at the [facility] or [phone] if your surgery is at the [facility]. > Do not wear make-up, nail polish, lotions, cologne, perfumes, powders, or oil on skin. Artificial nails are not permitted.

## 2023-06-07 ENCOUNTER — ANESTHESIA EVENT (OUTPATIENT)
Dept: SURGERY | Age: 38
End: 2023-06-07
Payer: COMMERCIAL

## 2023-06-07 DIAGNOSIS — M75.02 ADHESIVE CAPSULITIS OF LEFT SHOULDER: Primary | ICD-10-CM

## 2023-06-07 RX ORDER — OXYCODONE AND ACETAMINOPHEN 7.5; 325 MG/1; MG/1
1-2 TABLET ORAL
Qty: 30 TABLET | Refills: 0 | Status: SHIPPED | OUTPATIENT
Start: 2023-06-07 | End: 2023-06-10

## 2023-06-07 RX ORDER — AMOXICILLIN 250 MG
1 CAPSULE ORAL DAILY
Qty: 21 TABLET | Refills: 0 | Status: SHIPPED | OUTPATIENT
Start: 2023-06-07

## 2023-06-07 RX ORDER — NALOXONE HYDROCHLORIDE 2 MG/.4ML
2 INJECTION, SOLUTION INTRAMUSCULAR; SUBCUTANEOUS
Qty: 0.4 ML | Refills: 0 | Status: SHIPPED | OUTPATIENT
Start: 2023-06-07 | End: 2023-06-07

## 2023-06-07 RX ORDER — ASPIRIN 325 MG
325 TABLET ORAL DAILY
Qty: 7 TABLET | Refills: 0 | Status: SHIPPED | OUTPATIENT
Start: 2023-06-07 | End: 2023-06-14

## 2023-06-07 RX ORDER — ONDANSETRON 8 MG/1
4 TABLET, ORALLY DISINTEGRATING ORAL EVERY 6 HOURS
Qty: 16 TABLET | Refills: 0 | Status: SHIPPED | OUTPATIENT
Start: 2023-06-07

## 2023-06-07 NOTE — DISCHARGE INSTRUCTIONS
Postoperative Instructions    Returning Home    Care of your incisions:    Stitches are removed 6-10 days after surgery at your 1st appointment after surgery. Moderate bleeding may occur at the incision sites. This should decrease quickly over time. Leave the dressings in place for 3-4 days. Then dressings may be removed and replaced with Band-Aids or fresh gauze. You may bath or shower after the dressings have been removed but the incisions must be kept clean and dry. Using [employer] or a garbage bag can be useful to avoid getting the incisions wet. You will wear the sling for comfort and until any block wears off, if you had one. Watch the wound for increasing redness, tenderness, swelling and pus drainage daily. These can be early signs of infection. Please communicate any concerns. A slight temperature the first few days after surgery is not uncommon. This can be treated with deep breathing and coughing to clear the lungs. However, fevers (anything over 101°F), increasing pain, and swelling at the incisions should be reported immediately. Keep the wounds dry until your first follow-up visit. It is also good to move the wrist and hand. This can be done as much as you desire, but at least three times a day. No weight bearing/lifting with the operative arm. Deep vein thrombosis (DVT) is a condition in which a blood clot has formed in a deep vein of the leg or arm. This may result in redness, swelling, warmth and/or pain of the leg/arm. Although these are very rare, there are things that can be done to lessen the risk. It is recommended by your surgeon unless indicated otherwise, after arthroscopy; take an adult aspirin once a day for three weeks after surgery to help prevent the formation of blood clots. (Do not take aspirin against the advice of your medical doctor). Pain Management:    Pain after the surgery will vary from patient to patient.  A certain

## 2023-06-07 NOTE — PERIOP NOTE
Preop department called to notify patient of arrival time for scheduled procedure. Instructions given to   - Arrive at [address]. - Remain NPO after midnight, unless otherwise indicated, including gum, mints, and ice chips. - Have a responsible adult to drive patient to the hospital, stay during surgery, and patient will need supervision 24 hours after anesthesia. - Use antibacterial soap in shower the night before surgery and on the morning of surgery.        Was patient contacted: no  Voicemail left: yes  Numbers contacted: [phone]   Arrival time: 0530

## 2023-06-07 NOTE — PROGRESS NOTES
Preop department called to notify patient of arrival time for scheduled procedure. Instructions given to   - Arrive at [address]. - Remain NPO after midnight, unless otherwise indicated, including gum, mints, and ice chips. - Have a responsible adult to drive patient to the hospital, stay during surgery, and patient will need supervision 24 hours after anesthesia. - Use antibacterial soap in shower the night before surgery and on the morning of surgery.        Was patient contacted: yes, self  Voicemail left: n/a  Numbers contacted: [phone]   Arrival time: 8697

## 2023-06-07 NOTE — H&P
perioperative period and any recovery window from their procedure. The patient was made aware that nazanin Covid-19  may worsen their prognosis for recovering from their procedure and lend to a higher morbidity and/or mortality risk. All material risks, benefits, and reasonable alternatives including postponing the procedure were discussed. The patient does  wish to proceed with the procedure at this time.       Signed By: Finn Morales MD     June 8, 2023 6:54 AM

## 2023-06-08 ENCOUNTER — ANESTHESIA (OUTPATIENT)
Dept: SURGERY | Age: 38
End: 2023-06-08
Payer: COMMERCIAL

## 2023-06-08 ENCOUNTER — HOSPITAL ENCOUNTER (OUTPATIENT)
Age: 38
Setting detail: OUTPATIENT SURGERY
Discharge: HOME | End: 2023-06-08
Attending: ORTHOPAEDIC SURGERY | Admitting: ORTHOPAEDIC SURGERY
Payer: COMMERCIAL

## 2023-06-08 VITALS
WEIGHT: 290 LBS | TEMPERATURE: 97.5 F | SYSTOLIC BLOOD PRESSURE: 126 MMHG | HEART RATE: 90 BPM | BODY MASS INDEX: 39.28 KG/M2 | OXYGEN SATURATION: 94 % | DIASTOLIC BLOOD PRESSURE: 69 MMHG | HEIGHT: 72 IN | RESPIRATION RATE: 16 BRPM

## 2023-06-08 DIAGNOSIS — M75.02 ADHESIVE CAPSULITIS OF LEFT SHOULDER: ICD-10-CM

## 2023-06-08 LAB
GLUCOSE BLD STRIP.AUTO-MCNC: 141 MG/DL (ref 65–100)
SERVICE CMNT-IMP: ABNORMAL

## 2023-06-08 PROCEDURE — 7100000010 HC PHASE II RECOVERY - FIRST 15 MIN: Performed by: ORTHOPAEDIC SURGERY

## 2023-06-08 PROCEDURE — 3700000001 HC ADD 15 MINUTES (ANESTHESIA): Performed by: ORTHOPAEDIC SURGERY

## 2023-06-08 PROCEDURE — 7100000000 HC PACU RECOVERY - FIRST 15 MIN: Performed by: ORTHOPAEDIC SURGERY

## 2023-06-08 PROCEDURE — 6360000002 HC RX W HCPCS: Performed by: ORTHOPAEDIC SURGERY

## 2023-06-08 PROCEDURE — 3700000000 HC ANESTHESIA ATTENDED CARE: Performed by: ORTHOPAEDIC SURGERY

## 2023-06-08 PROCEDURE — 2720000010 HC SURG SUPPLY STERILE: Performed by: ORTHOPAEDIC SURGERY

## 2023-06-08 PROCEDURE — 64415 NJX AA&/STRD BRCH PLXS IMG: CPT | Performed by: ANESTHESIOLOGY

## 2023-06-08 PROCEDURE — 6360000002 HC RX W HCPCS: Performed by: ANESTHESIOLOGY

## 2023-06-08 PROCEDURE — 2500000003 HC RX 250 WO HCPCS: Performed by: ORTHOPAEDIC SURGERY

## 2023-06-08 PROCEDURE — 2500000003 HC RX 250 WO HCPCS: Performed by: ANESTHESIOLOGY

## 2023-06-08 PROCEDURE — 6370000000 HC RX 637 (ALT 250 FOR IP): Performed by: ANESTHESIOLOGY

## 2023-06-08 PROCEDURE — 6360000002 HC RX W HCPCS: Performed by: NURSE ANESTHETIST, CERTIFIED REGISTERED

## 2023-06-08 PROCEDURE — 6360000002 HC RX W HCPCS: Performed by: PHYSICIAN ASSISTANT

## 2023-06-08 PROCEDURE — A4216 STERILE WATER/SALINE, 10 ML: HCPCS | Performed by: ANESTHESIOLOGY

## 2023-06-08 PROCEDURE — 2580000003 HC RX 258: Performed by: ANESTHESIOLOGY

## 2023-06-08 PROCEDURE — 82962 GLUCOSE BLOOD TEST: CPT

## 2023-06-08 PROCEDURE — 3600000004 HC SURGERY LEVEL 4 BASE: Performed by: ORTHOPAEDIC SURGERY

## 2023-06-08 PROCEDURE — 7100000001 HC PACU RECOVERY - ADDTL 15 MIN: Performed by: ORTHOPAEDIC SURGERY

## 2023-06-08 PROCEDURE — 2500000003 HC RX 250 WO HCPCS: Performed by: NURSE ANESTHETIST, CERTIFIED REGISTERED

## 2023-06-08 PROCEDURE — 3600000014 HC SURGERY LEVEL 4 ADDTL 15MIN: Performed by: ORTHOPAEDIC SURGERY

## 2023-06-08 PROCEDURE — 2709999900 HC NON-CHARGEABLE SUPPLY: Performed by: ORTHOPAEDIC SURGERY

## 2023-06-08 RX ORDER — ONDANSETRON 2 MG/ML
INJECTION INTRAMUSCULAR; INTRAVENOUS PRN
Status: DISCONTINUED | OUTPATIENT
Start: 2023-06-08 | End: 2023-06-08 | Stop reason: SDUPTHER

## 2023-06-08 RX ORDER — SODIUM CHLORIDE 9 MG/ML
INJECTION, SOLUTION INTRAVENOUS PRN
Status: DISCONTINUED | OUTPATIENT
Start: 2023-06-08 | End: 2023-06-08 | Stop reason: HOSPADM

## 2023-06-08 RX ORDER — FENTANYL CITRATE 50 UG/ML
100 INJECTION, SOLUTION INTRAMUSCULAR; INTRAVENOUS
Status: COMPLETED | OUTPATIENT
Start: 2023-06-08 | End: 2023-06-08

## 2023-06-08 RX ORDER — OXYCODONE HYDROCHLORIDE 5 MG/1
5 TABLET ORAL
Status: DISCONTINUED | OUTPATIENT
Start: 2023-06-08 | End: 2023-06-08 | Stop reason: HOSPADM

## 2023-06-08 RX ORDER — SODIUM CHLORIDE, SODIUM LACTATE, POTASSIUM CHLORIDE, CALCIUM CHLORIDE 600; 310; 30; 20 MG/100ML; MG/100ML; MG/100ML; MG/100ML
INJECTION, SOLUTION INTRAVENOUS CONTINUOUS
Status: DISCONTINUED | OUTPATIENT
Start: 2023-06-08 | End: 2023-06-08 | Stop reason: HOSPADM

## 2023-06-08 RX ORDER — OXYCODONE HYDROCHLORIDE 5 MG/1
5 TABLET ORAL PRN
Status: DISCONTINUED | OUTPATIENT
Start: 2023-06-08 | End: 2023-06-08 | Stop reason: HOSPADM

## 2023-06-08 RX ORDER — OXYCODONE HYDROCHLORIDE 5 MG/1
10 TABLET ORAL PRN
Status: DISCONTINUED | OUTPATIENT
Start: 2023-06-08 | End: 2023-06-08 | Stop reason: HOSPADM

## 2023-06-08 RX ORDER — SODIUM CHLORIDE 0.9 % (FLUSH) 0.9 %
5-40 SYRINGE (ML) INJECTION PRN
Status: DISCONTINUED | OUTPATIENT
Start: 2023-06-08 | End: 2023-06-08 | Stop reason: HOSPADM

## 2023-06-08 RX ORDER — ACETAMINOPHEN 500 MG
1000 TABLET ORAL ONCE
Status: COMPLETED | OUTPATIENT
Start: 2023-06-08 | End: 2023-06-08

## 2023-06-08 RX ORDER — METHYLPREDNISOLONE ACETATE 40 MG/ML
INJECTION, SUSPENSION INTRA-ARTICULAR; INTRALESIONAL; INTRAMUSCULAR; SOFT TISSUE PRN
Status: DISCONTINUED | OUTPATIENT
Start: 2023-06-08 | End: 2023-06-08 | Stop reason: ALTCHOICE

## 2023-06-08 RX ORDER — SODIUM CHLORIDE 0.9 % (FLUSH) 0.9 %
5-40 SYRINGE (ML) INJECTION EVERY 12 HOURS SCHEDULED
Status: DISCONTINUED | OUTPATIENT
Start: 2023-06-08 | End: 2023-06-08 | Stop reason: HOSPADM

## 2023-06-08 RX ORDER — ACETAMINOPHEN 500 MG
1000 TABLET ORAL ONCE
Status: DISCONTINUED | OUTPATIENT
Start: 2023-06-08 | End: 2023-06-08 | Stop reason: HOSPADM

## 2023-06-08 RX ORDER — HYDROMORPHONE HYDROCHLORIDE 2 MG/ML
0.5 INJECTION, SOLUTION INTRAMUSCULAR; INTRAVENOUS; SUBCUTANEOUS EVERY 5 MIN PRN
Status: DISCONTINUED | OUTPATIENT
Start: 2023-06-08 | End: 2023-06-08 | Stop reason: HOSPADM

## 2023-06-08 RX ORDER — PROPOFOL 10 MG/ML
INJECTION, EMULSION INTRAVENOUS PRN
Status: DISCONTINUED | OUTPATIENT
Start: 2023-06-08 | End: 2023-06-08 | Stop reason: SDUPTHER

## 2023-06-08 RX ORDER — EPINEPHRINE 1 MG/ML(1)
AMPUL (ML) INJECTION PRN
Status: DISCONTINUED | OUTPATIENT
Start: 2023-06-08 | End: 2023-06-08 | Stop reason: ALTCHOICE

## 2023-06-08 RX ORDER — MEPERIDINE HYDROCHLORIDE 25 MG/ML
12.5 INJECTION INTRAMUSCULAR; INTRAVENOUS; SUBCUTANEOUS EVERY 5 MIN PRN
Status: DISCONTINUED | OUTPATIENT
Start: 2023-06-08 | End: 2023-06-08 | Stop reason: HOSPADM

## 2023-06-08 RX ORDER — TRANEXAMIC ACID 100 MG/ML
INJECTION, SOLUTION INTRAVENOUS PRN
Status: DISCONTINUED | OUTPATIENT
Start: 2023-06-08 | End: 2023-06-08 | Stop reason: SDUPTHER

## 2023-06-08 RX ORDER — ONDANSETRON 2 MG/ML
4 INJECTION INTRAMUSCULAR; INTRAVENOUS
Status: DISCONTINUED | OUTPATIENT
Start: 2023-06-08 | End: 2023-06-08 | Stop reason: HOSPADM

## 2023-06-08 RX ORDER — LIDOCAINE HYDROCHLORIDE 10 MG/ML
1 INJECTION, SOLUTION INFILTRATION; PERINEURAL
Status: DISCONTINUED | OUTPATIENT
Start: 2023-06-08 | End: 2023-06-08 | Stop reason: HOSPADM

## 2023-06-08 RX ORDER — FENTANYL CITRATE 50 UG/ML
100 INJECTION, SOLUTION INTRAMUSCULAR; INTRAVENOUS
Status: DISCONTINUED | OUTPATIENT
Start: 2023-06-08 | End: 2023-06-08 | Stop reason: HOSPADM

## 2023-06-08 RX ORDER — HYDROMORPHONE HYDROCHLORIDE 2 MG/ML
0.25 INJECTION, SOLUTION INTRAMUSCULAR; INTRAVENOUS; SUBCUTANEOUS EVERY 5 MIN PRN
Status: DISCONTINUED | OUTPATIENT
Start: 2023-06-08 | End: 2023-06-08 | Stop reason: HOSPADM

## 2023-06-08 RX ORDER — MIDAZOLAM HYDROCHLORIDE 2 MG/2ML
2 INJECTION, SOLUTION INTRAMUSCULAR; INTRAVENOUS
Status: COMPLETED | OUTPATIENT
Start: 2023-06-08 | End: 2023-06-08

## 2023-06-08 RX ORDER — ROCURONIUM BROMIDE 10 MG/ML
INJECTION, SOLUTION INTRAVENOUS PRN
Status: DISCONTINUED | OUTPATIENT
Start: 2023-06-08 | End: 2023-06-08 | Stop reason: SDUPTHER

## 2023-06-08 RX ORDER — LIDOCAINE HYDROCHLORIDE AND EPINEPHRINE 10; 10 MG/ML; UG/ML
INJECTION, SOLUTION INFILTRATION; PERINEURAL PRN
Status: DISCONTINUED | OUTPATIENT
Start: 2023-06-08 | End: 2023-06-08 | Stop reason: ALTCHOICE

## 2023-06-08 RX ORDER — LIDOCAINE HYDROCHLORIDE 20 MG/ML
INJECTION, SOLUTION EPIDURAL; INFILTRATION; INTRACAUDAL; PERINEURAL PRN
Status: DISCONTINUED | OUTPATIENT
Start: 2023-06-08 | End: 2023-06-08 | Stop reason: SDUPTHER

## 2023-06-08 RX ORDER — MIDAZOLAM HYDROCHLORIDE 2 MG/2ML
2 INJECTION, SOLUTION INTRAMUSCULAR; INTRAVENOUS
Status: DISCONTINUED | OUTPATIENT
Start: 2023-06-08 | End: 2023-06-08 | Stop reason: HOSPADM

## 2023-06-08 RX ORDER — SUCCINYLCHOLINE/SOD CL,ISO/PF 200MG/10ML
SYRINGE (ML) INTRAVENOUS PRN
Status: DISCONTINUED | OUTPATIENT
Start: 2023-06-08 | End: 2023-06-08 | Stop reason: SDUPTHER

## 2023-06-08 RX ORDER — DEXAMETHASONE SODIUM PHOSPHATE 10 MG/ML
INJECTION INTRAMUSCULAR; INTRAVENOUS PRN
Status: DISCONTINUED | OUTPATIENT
Start: 2023-06-08 | End: 2023-06-08 | Stop reason: SDUPTHER

## 2023-06-08 RX ADMIN — ROCURONIUM BROMIDE 5 MG: 10 INJECTION INTRAVENOUS at 07:08

## 2023-06-08 RX ADMIN — FENTANYL CITRATE 100 MCG: 0.05 INJECTION, SOLUTION INTRAMUSCULAR; INTRAVENOUS at 06:25

## 2023-06-08 RX ADMIN — PHENYLEPHRINE HYDROCHLORIDE 100 MCG: 0.1 INJECTION, SOLUTION INTRAVENOUS at 07:45

## 2023-06-08 RX ADMIN — ACETAMINOPHEN 1000 MG: 500 TABLET, FILM COATED ORAL at 06:01

## 2023-06-08 RX ADMIN — DEXAMETHASONE SODIUM PHOSPHATE 4 MG: 4 INJECTION, SOLUTION INTRAMUSCULAR; INTRAVENOUS at 06:24

## 2023-06-08 RX ADMIN — PHENYLEPHRINE HYDROCHLORIDE 100 MCG: 0.1 INJECTION, SOLUTION INTRAVENOUS at 07:39

## 2023-06-08 RX ADMIN — PHENYLEPHRINE HYDROCHLORIDE 100 MCG: 0.1 INJECTION, SOLUTION INTRAVENOUS at 07:14

## 2023-06-08 RX ADMIN — Medication 3000 MG: at 07:16

## 2023-06-08 RX ADMIN — PROPOFOL 100 MG: 10 INJECTION, EMULSION INTRAVENOUS at 07:18

## 2023-06-08 RX ADMIN — PHENYLEPHRINE HYDROCHLORIDE 100 MCG: 0.1 INJECTION, SOLUTION INTRAVENOUS at 07:49

## 2023-06-08 RX ADMIN — MIDAZOLAM 2 MG: 1 INJECTION INTRAMUSCULAR; INTRAVENOUS at 06:25

## 2023-06-08 RX ADMIN — DEXAMETHASONE SODIUM PHOSPHATE 4 MG: 10 INJECTION INTRAMUSCULAR; INTRAVENOUS at 07:19

## 2023-06-08 RX ADMIN — ONDANSETRON 4 MG: 2 INJECTION INTRAMUSCULAR; INTRAVENOUS at 07:19

## 2023-06-08 RX ADMIN — PROPOFOL 350 MG: 10 INJECTION, EMULSION INTRAVENOUS at 07:08

## 2023-06-08 RX ADMIN — TRANEXAMIC ACID 1000 MG: 100 INJECTION, SOLUTION INTRAVENOUS at 07:16

## 2023-06-08 RX ADMIN — SODIUM CHLORIDE, POTASSIUM CHLORIDE, SODIUM LACTATE AND CALCIUM CHLORIDE: 600; 310; 30; 20 INJECTION, SOLUTION INTRAVENOUS at 06:00

## 2023-06-08 RX ADMIN — ROPIVACAINE HYDROCHLORIDE 30 ML: 5 INJECTION, SOLUTION EPIDURAL; INFILTRATION; PERINEURAL at 06:24

## 2023-06-08 RX ADMIN — Medication 200 MG: at 07:08

## 2023-06-08 RX ADMIN — LIDOCAINE HYDROCHLORIDE 100 MG: 20 INJECTION, SOLUTION EPIDURAL; INFILTRATION; INTRACAUDAL; PERINEURAL at 07:08

## 2023-06-08 ASSESSMENT — PAIN SCALES - GENERAL: PAINLEVEL_OUTOF10: 7

## 2023-06-08 NOTE — ANESTHESIA POSTPROCEDURE EVALUATION
Department of Anesthesiology  Postprocedure Note    Patient: Kailee Gomez  MRN: [id number]  YOB: 1985  Date of evaluation: 6/8/2023      Procedure Summary     Date: 06/08/23 Room / Location: [facility] OP OR 08 / [facility]    Anesthesia Start: 6194 Anesthesia Stop: 0827    Procedure: LEFT SHOULDER ARTHROSCOPY MANIPULATION UNDER ANESTHESIA/LYSIS OF ADHESIONS (Left: Shoulder) Diagnosis:       Adhesive capsulitis of left shoulder      Bilateral shoulder pain, unspecified chronicity      (Adhesive capsulitis of left shoulder [M75.02])      (Bilateral shoulder pain, unspecified chronicity [M25.511, M25.512])    Surgeons: Chelsea Bryant MD Responsible Provider:     Anesthesia Type: general ASA Status: 3          Anesthesia Type: No value filed. Tish Phase I: Tish Score: 8    Tish Phase II:        Anesthesia Post Evaluation    Patient location during evaluation: PACU  Patient participation: complete - patient participated  Level of consciousness: awake and alert  Airway patency: patent  Nausea & Vomiting: no nausea and no vomiting  Complications: no  Cardiovascular status: hemodynamically stable  Respiratory status: acceptable  Hydration status: euvolemic  Comments: Blood pressure 122/61, pulse 87, temperature 98.1 °F (36.7 °C), temperature source Temporal, resp. rate 14, height 6' (1.829 m), weight 290 lb (131.5 kg), SpO2 97 %.       Pt stable for discharge from PACU  Multimodal analgesia pain management approach

## 2023-06-08 NOTE — ANESTHESIA PRE PROCEDURE
multiple with surgical intervention X1    History of TIA (transient ischemic attack)     no deficits    Hx of blood clots     history of Right DVT    Hypercholesterolemia     Left shoulder pain     Morbid obesity (HCC)     BMI 39    Nausea & vomiting     Sleep apnea     does not wear CPAP    Stroke (HCC)     TIA per pt    Type 2 diabetes mellitus (HCC)     insulin reliant/AVG FBS: 170-180/s.s of hypoglycemia at 90-95- /Last A1c: 12       Past Surgical History:        Procedure Laterality Date    ABSCESS DRAINAGE Right     foot    ADENOIDECTOMY  age13    APPENDECTOMY  1996    INCISION AND DRAINAGE OF WOUND Right     staph infection to Right knee area    KNEE ARTHROSCOPY Left     X3    ORTHOPEDIC SURGERY  2015    right shoulder repair    TOE AMPUTATION Right     Right great toe    TONSILLECTOMY  age 10       Social History:    Social History     Tobacco Use    Smoking status: Never    Smokeless tobacco: Never   Substance Use Topics    Alcohol use: Yes     Comment: rarely                                Counseling given: Not Answered      Vital Signs (Current):   Vitals:    06/01/23 1538 06/06/23 1124 06/08/23 0530   BP:   (!) 150/95   Pulse:   90   Resp:   18   Temp:   98 °F (36.7 °C)   TempSrc:   Oral   SpO2:   96%   Weight: 290 lb (131.5 kg) 290 lb (131.5 kg)    Height: 6' (1.829 m) 6' (1.829 m)                                               BP Readings from Last 3 Encounters:   06/08/23 (!) 150/95   04/26/23 120/80   04/25/23 (!) 144/94       NPO Status: Time of last liquid consumption: 0430                        Time of last solid consumption: 2300                        Date of last liquid consumption: 06/08/23                        Date of last solid food consumption: 06/07/23    BMI:   Wt Readings from Last 3 Encounters:   06/06/23 290 lb (131.5 kg)   04/26/23 298 lb (135.2 kg)   04/25/23 290 lb (131.5 kg)     Body mass index is 39.33 kg/m².     CBC:   Lab Results   Component Value

## 2023-06-08 NOTE — OP NOTE
condition. Articular surface in good condition. Labrum was normal and the biceps itself looked good and was not noted to be adherent to any tissue. The scope was then removed from the shoulder. The cannula was removed. Interrupted monofilament sutures were placed in the portals. A sterile dressing was placed on the shoulder. The patient was placed in a sling. They were returned to the recovery room in satisfactory condition. There were no known intraoperative complications. All counts were correct. Post-operative plan: The patient was placed in a sling and will remain in this until the block wears off. Post operative instructions are provided. They will begin with instructed ROM exercises and PT scheduled through my office. I will see them back in approximately 10 days. Estimated Blood Loss:  minimal    Fluids:   see anesthesia notes.     Implant: * No implants in log *    Closure: Primary    Complications: None    Signed By: Dalia Kenney MD

## 2023-06-08 NOTE — ANESTHESIA PROCEDURE NOTES
Airway  Date/Time: 6/8/2023 7:11 AM  Urgency: elective      General Information and Staff    Patient location during procedure: OR  Resident/CRNA: ZULLY Delgado - CRNA    Indications and Patient Condition  Indications for airway management: airway protection and anesthesia  Sedation level: deep  Preoxygenated: yes  Patient position: reverse Trendelenburg  MILS not maintained throughout  Mask difficulty assessment: difficult bag mask (inadequate, unstable or two providers) +/- NMBA    Final Airway Details  Final airway type: endotracheal airway      Successful airway: ETT     Successful intubation technique: video laryngoscopy (rsi)  Blade size: #4  ETT size (mm): 8.0  Cormack-Lehane Classification: grade I - full view of glottis  Placement verified by: chest auscultation and capnometry   Number of attempts at approach: 1  Ventilation between attempts: 2 hand mask  Number of other approaches attempted: 0    Additional Comments  Elective glidescope, 2 hand OA BMV. Lips/dentition as pre-op.
mg/mL - Perineural   4 mg - 6/8/2023 6:24:00 AM

## 2023-06-16 ENCOUNTER — HOSPITAL ENCOUNTER (OUTPATIENT)
Dept: PHYSICAL THERAPY | Age: 38
Setting detail: RECURRING SERIES
Discharge: HOME | End: 2023-06-19
Payer: COMMERCIAL

## 2023-06-16 PROCEDURE — 97110 THERAPEUTIC EXERCISES: CPT

## 2023-06-19 ENCOUNTER — HOSPITAL ENCOUNTER (OUTPATIENT)
Dept: PHYSICAL THERAPY | Age: 38
Setting detail: RECURRING SERIES
End: 2023-06-19
Payer: COMMERCIAL

## 2023-06-20 ENCOUNTER — OFFICE VISIT (OUTPATIENT)
Dept: ORTHOPEDIC SURGERY | Age: 38
End: 2023-06-20

## 2023-06-20 DIAGNOSIS — Z09 SURGERY FOLLOW-UP: ICD-10-CM

## 2023-06-20 DIAGNOSIS — M75.02 ADHESIVE CAPSULITIS OF LEFT SHOULDER: Primary | ICD-10-CM

## 2023-06-20 PROCEDURE — 99024 POSTOP FOLLOW-UP VISIT: CPT | Performed by: ORTHOPAEDIC SURGERY

## 2023-06-20 NOTE — PROGRESS NOTES
Name: Sarah Keith  YOB: 1985  Gender: male  MRN: [id number]    CC:   Chief Complaint   Patient presents with    Follow-up     1st  post op left shoulder scope SARAH/MORIS DOS 6-8-23       Left Shoulder Arthroscopy Manipulation Under Anesthesia/lysis Of Adhesions - Left  6/8/2023    HPI: Patient presents for first postop visit of left shoulder pain status post left shoulder arthroscopy, manipulation under anesthesia and lysis of adhesions on 6/8-23.   Today the patient states his blood sugar was up for a couple days but he is doing great now    Allergies   Allergen Reactions    Promethazine Anaphylaxis     Makes me stop breathing\"    Metformin Diarrhea     Past Medical History:   Diagnosis Date    Adhesive capsulitis of left shoulder 06/01/2023    Amputated toe ([address]     right great toe    Arrhythmia     \" artery in heart goes into spams\"  no meds    Depression with anxiety 4/11/2017    Essential hypertension 9/2/2018    Gastroesophageal reflux disease without esophagitis 04/26/2023    History of kidney stones     multiple with surgical intervention X1    History of TIA (transient ischemic attack)     no deficits    Hx of blood clots     history of Right DVT    Hypercholesterolemia     Left shoulder pain     Morbid obesity (HCC)     BMI 39    Nausea & vomiting     Sleep apnea     does not wear CPAP    Stroke ([address]     TIA per pt    Type 2 diabetes mellitus (HCC)     insulin reliant/AVG FBS: 170-180/s.s of hypoglycemia at 90-95- /Last A1c: 12     Past Surgical History:   Procedure Laterality Date    ABSCESS DRAINAGE Right     foot    ADENOIDECTOMY  age15    APPENDECTOMY  1996    INCISION AND DRAINAGE OF WOUND Right     staph infection to Right knee area    KNEE ARTHROSCOPY Left     X3    ORTHOPEDIC SURGERY  2015    right shoulder repair    SHOULDER ARTHROSCOPY Left 6/8/2023    LEFT SHOULDER ARTHROSCOPY MANIPULATION UNDER ANESTHESIA/LYSIS OF ADHESIONS performed by Patrice Fang MD at [facility]

## 2023-06-26 ENCOUNTER — HOSPITAL ENCOUNTER (OUTPATIENT)
Dept: PHYSICAL THERAPY | Age: 38
Setting detail: RECURRING SERIES
End: 2023-06-26
Payer: COMMERCIAL

## 2023-06-28 ENCOUNTER — APPOINTMENT (OUTPATIENT)
Dept: PHYSICAL THERAPY | Age: 38
End: 2023-06-28
Payer: COMMERCIAL

## 2023-06-29 ENCOUNTER — OFFICE VISIT (OUTPATIENT)
Dept: FAMILY MEDICINE CLINIC | Facility: CLINIC | Age: 38
End: 2023-06-29
Payer: COMMERCIAL

## 2023-06-29 VITALS
RESPIRATION RATE: 16 BRPM | TEMPERATURE: 98.4 F | SYSTOLIC BLOOD PRESSURE: 122 MMHG | HEIGHT: 72 IN | BODY MASS INDEX: 42.66 KG/M2 | DIASTOLIC BLOOD PRESSURE: 86 MMHG | HEART RATE: 84 BPM | OXYGEN SATURATION: 98 % | WEIGHT: 315 LBS

## 2023-06-29 DIAGNOSIS — K21.00 GASTROESOPHAGEAL REFLUX DISEASE WITH ESOPHAGITIS WITHOUT HEMORRHAGE: Primary | ICD-10-CM

## 2023-06-29 DIAGNOSIS — R19.7 ACUTE DIARRHEA: ICD-10-CM

## 2023-06-29 PROCEDURE — 3078F DIAST BP <80 MM HG: CPT | Performed by: FAMILY MEDICINE

## 2023-06-29 PROCEDURE — 99214 OFFICE O/P EST MOD 30 MIN: CPT | Performed by: FAMILY MEDICINE

## 2023-06-29 PROCEDURE — 3074F SYST BP LT 130 MM HG: CPT | Performed by: FAMILY MEDICINE

## 2023-06-29 RX ORDER — FAMOTIDINE 40 MG/1
40 TABLET, FILM COATED ORAL 2 TIMES DAILY
Qty: 60 TABLET | Refills: 0 | Status: SHIPPED | OUTPATIENT
Start: 2023-06-29

## 2023-06-29 ASSESSMENT — PATIENT HEALTH QUESTIONNAIRE - PHQ9
SUM OF ALL RESPONSES TO PHQ QUESTIONS 1-9: 0
SUM OF ALL RESPONSES TO PHQ9 QUESTIONS 1 & 2: 0
1. LITTLE INTEREST OR PLEASURE IN DOING THINGS: 0
2. FEELING DOWN, DEPRESSED OR HOPELESS: 0

## 2023-06-29 ASSESSMENT — ENCOUNTER SYMPTOMS: DIARRHEA: 1

## 2023-07-02 ENCOUNTER — HOSPITAL ENCOUNTER (EMERGENCY)
Age: 38
Discharge: HOME | End: 2023-07-02
Attending: EMERGENCY MEDICINE
Payer: COMMERCIAL

## 2023-07-02 ENCOUNTER — APPOINTMENT (OUTPATIENT)
Dept: CT IMAGING | Age: 38
End: 2023-07-02
Payer: COMMERCIAL

## 2023-07-02 VITALS
HEART RATE: 91 BPM | WEIGHT: 315 LBS | TEMPERATURE: 98.6 F | HEIGHT: 72 IN | RESPIRATION RATE: 18 BRPM | DIASTOLIC BLOOD PRESSURE: 92 MMHG | SYSTOLIC BLOOD PRESSURE: 136 MMHG | BODY MASS INDEX: 42.66 KG/M2 | OXYGEN SATURATION: 97 %

## 2023-07-02 DIAGNOSIS — R19.7 DIARRHEA, UNSPECIFIED TYPE: Primary | ICD-10-CM

## 2023-07-02 LAB
ALBUMIN SERPL-MCNC: 4.1 G/DL (ref 3.5–5)
ALBUMIN/GLOB SERPL: 1.2 (ref 0.4–1.6)
ALP SERPL-CCNC: 148 U/L (ref 45–117)
ALT SERPL-CCNC: 44 U/L (ref 13–61)
ANION GAP SERPL CALC-SCNC: 9 MMOL/L (ref 2–11)
APPEARANCE UR: CLEAR
AST SERPL-CCNC: 25 U/L (ref 15–37)
BACTERIA URNS QL MICRO: 0 /HPF
BASOPHILS # BLD: 0.1 K/UL (ref 0–0.2)
BASOPHILS NFR BLD: 1 % (ref 0–2)
BILIRUB SERPL-MCNC: 0.4 MG/DL (ref 0.2–1.1)
BILIRUB UR QL: NEGATIVE
BUN SERPL-MCNC: 13 MG/DL (ref 6–23)
CALCIUM SERPL-MCNC: 9.3 MG/DL (ref 8.3–10.4)
CASTS URNS QL MICRO: 0 /LPF
CHLORIDE SERPL-SCNC: 105 MMOL/L (ref 98–107)
CO2 SERPL-SCNC: 26 MMOL/L (ref 21–32)
COLOR UR: YELLOW
CREAT SERPL-MCNC: 0.98 MG/DL (ref 0.8–1.5)
CRYSTALS URNS QL MICRO: 0 /LPF
DIFFERENTIAL METHOD BLD: ABNORMAL
EOSINOPHIL # BLD: 0.2 K/UL (ref 0–0.8)
EOSINOPHIL NFR BLD: 3 % (ref 0.5–7.8)
EPI CELLS #/AREA URNS HPF: ABNORMAL /HPF
ERYTHROCYTE [DISTWIDTH] IN BLOOD BY AUTOMATED COUNT: 14.1 % (ref 11.9–14.6)
GLOBULIN SER CALC-MCNC: 3.3 G/DL (ref 2.8–4.5)
GLUCOSE SERPL-MCNC: 140 MG/DL (ref 65–100)
GLUCOSE UR STRIP.AUTO-MCNC: NEGATIVE MG/DL
HCT VFR BLD AUTO: 41.9 % (ref 41.1–50.3)
HGB BLD-MCNC: 13.4 G/DL (ref 13.6–17.2)
HGB UR QL STRIP: NEGATIVE
IMM GRANULOCYTES # BLD AUTO: 0 K/UL (ref 0–0.5)
IMM GRANULOCYTES NFR BLD AUTO: 0 % (ref 0–5)
KETONES UR QL STRIP.AUTO: NEGATIVE MG/DL
LEUKOCYTE ESTERASE UR QL STRIP.AUTO: NEGATIVE
LIPASE SERPL-CCNC: 19 U/L (ref 13–60)
LYMPHOCYTES # BLD: 1.3 K/UL (ref 0.5–4.6)
LYMPHOCYTES NFR BLD: 17 % (ref 13–44)
MCH RBC QN AUTO: 24.9 PG (ref 26.1–32.9)
MCHC RBC AUTO-ENTMCNC: 32 G/DL (ref 31.4–35)
MCV RBC AUTO: 77.7 FL (ref 82–102)
MONOCYTES # BLD: 0.4 K/UL (ref 0.1–1.3)
MONOCYTES NFR BLD: 6 % (ref 4–12)
MUCOUS THREADS URNS QL MICRO: ABNORMAL /LPF
NEUTS SEG # BLD: 5.6 K/UL (ref 1.7–8.2)
NEUTS SEG NFR BLD: 74 % (ref 43–78)
NITRITE UR QL STRIP.AUTO: NEGATIVE
NRBC # BLD: 0 K/UL (ref 0–0.2)
OTHER OBSERVATIONS: ABNORMAL
PH UR STRIP: 5.5 (ref 5–9)
PLATELET # BLD AUTO: 289 K/UL (ref 150–450)
PMV BLD AUTO: 9 FL (ref 9.4–12.3)
POTASSIUM SERPL-SCNC: 4.3 MMOL/L (ref 3.5–5.1)
PROT SERPL-MCNC: 7.4 G/DL (ref 6.4–8.2)
PROT UR STRIP-MCNC: 30 MG/DL
RBC # BLD AUTO: 5.39 M/UL (ref 4.23–5.6)
RBC #/AREA URNS HPF: 0 /HPF
SODIUM SERPL-SCNC: 140 MMOL/L (ref 133–143)
SP GR UR REFRACTOMETRY: 1.02 (ref 1–1.02)
UROBILINOGEN UR QL STRIP.AUTO: 0.2 EU/DL (ref 0.2–1)
WBC # BLD AUTO: 7.6 K/UL (ref 4.3–11.1)
WBC URNS QL MICRO: 0 /HPF

## 2023-07-02 PROCEDURE — 83690 ASSAY OF LIPASE: CPT

## 2023-07-02 PROCEDURE — 85025 COMPLETE CBC W/AUTO DIFF WBC: CPT

## 2023-07-02 PROCEDURE — 99285 EMERGENCY DEPT VISIT HI MDM: CPT

## 2023-07-02 PROCEDURE — 6360000002 HC RX W HCPCS: Performed by: EMERGENCY MEDICINE

## 2023-07-02 PROCEDURE — 96375 TX/PRO/DX INJ NEW DRUG ADDON: CPT

## 2023-07-02 PROCEDURE — 2580000003 HC RX 258: Performed by: EMERGENCY MEDICINE

## 2023-07-02 PROCEDURE — 6360000004 HC RX CONTRAST MEDICATION: Performed by: EMERGENCY MEDICINE

## 2023-07-02 PROCEDURE — 81001 URINALYSIS AUTO W/SCOPE: CPT

## 2023-07-02 PROCEDURE — 80053 COMPREHEN METABOLIC PANEL: CPT

## 2023-07-02 PROCEDURE — 96374 THER/PROPH/DIAG INJ IV PUSH: CPT

## 2023-07-02 PROCEDURE — 74177 CT ABD & PELVIS W/CONTRAST: CPT

## 2023-07-02 RX ORDER — HYDROCODONE BITARTRATE AND ACETAMINOPHEN 5; 325 MG/1; MG/1
1 TABLET ORAL EVERY 6 HOURS PRN
Qty: 10 TABLET | Refills: 0 | Status: SHIPPED | OUTPATIENT
Start: 2023-07-02 | End: 2023-07-05

## 2023-07-02 RX ORDER — ONDANSETRON 2 MG/ML
4 INJECTION INTRAMUSCULAR; INTRAVENOUS ONCE
Status: COMPLETED | OUTPATIENT
Start: 2023-07-02 | End: 2023-07-02

## 2023-07-02 RX ORDER — ONDANSETRON 4 MG/1
4 TABLET, ORALLY DISINTEGRATING ORAL 3 TIMES DAILY PRN
Qty: 21 TABLET | Refills: 0 | Status: SHIPPED | OUTPATIENT
Start: 2023-07-02

## 2023-07-02 RX ORDER — 0.9 % SODIUM CHLORIDE 0.9 %
100 INTRAVENOUS SOLUTION INTRAVENOUS
Status: COMPLETED | OUTPATIENT
Start: 2023-07-02 | End: 2023-07-02

## 2023-07-02 RX ORDER — MORPHINE SULFATE 4 MG/ML
4 INJECTION, SOLUTION INTRAMUSCULAR; INTRAVENOUS
Status: COMPLETED | OUTPATIENT
Start: 2023-07-02 | End: 2023-07-02

## 2023-07-02 RX ORDER — SODIUM CHLORIDE 0.9 % (FLUSH) 0.9 %
10 SYRINGE (ML) INJECTION
Status: COMPLETED | OUTPATIENT
Start: 2023-07-02 | End: 2023-07-02

## 2023-07-02 RX ADMIN — IOPAMIDOL 100 ML: 755 INJECTION, SOLUTION INTRAVENOUS at 14:44

## 2023-07-02 RX ADMIN — ONDANSETRON 4 MG: 2 INJECTION INTRAMUSCULAR; INTRAVENOUS at 14:14

## 2023-07-02 RX ADMIN — SODIUM CHLORIDE 100 ML: 9 INJECTION, SOLUTION INTRAVENOUS at 14:44

## 2023-07-02 RX ADMIN — SODIUM CHLORIDE, PRESERVATIVE FREE 10 ML: 5 INJECTION INTRAVENOUS at 14:44

## 2023-07-02 RX ADMIN — MORPHINE SULFATE 4 MG: 4 INJECTION INTRAVENOUS at 14:16

## 2023-07-02 ASSESSMENT — PAIN DESCRIPTION - LOCATION: LOCATION: ABDOMEN

## 2023-07-02 ASSESSMENT — ENCOUNTER SYMPTOMS
BLOOD IN STOOL: 0
SINUS PAIN: 0
ANAL BLEEDING: 0
ABDOMINAL DISTENTION: 1
NAUSEA: 0
ABDOMINAL PAIN: 0
VOMITING: 0
WHEEZING: 0
SHORTNESS OF BREATH: 0
COUGH: 0
CONSTIPATION: 0
ABDOMINAL PAIN: 1
RECTAL PAIN: 0

## 2023-07-02 ASSESSMENT — LIFESTYLE VARIABLES
HOW OFTEN DO YOU HAVE A DRINK CONTAINING ALCOHOL: NEVER
HOW MANY STANDARD DRINKS CONTAINING ALCOHOL DO YOU HAVE ON A TYPICAL DAY: PATIENT DOES NOT DRINK

## 2023-07-02 ASSESSMENT — PAIN SCALES - GENERAL
PAINLEVEL_OUTOF10: 3
PAINLEVEL_OUTOF10: 0
PAINLEVEL_OUTOF10: 6

## 2023-07-02 ASSESSMENT — PAIN - FUNCTIONAL ASSESSMENT
PAIN_FUNCTIONAL_ASSESSMENT: 0-10
PAIN_FUNCTIONAL_ASSESSMENT: 0-10

## 2023-07-02 ASSESSMENT — PAIN DESCRIPTION - DESCRIPTORS: DESCRIPTORS: PRESSURE

## 2023-07-04 ASSESSMENT — ENCOUNTER SYMPTOMS: CHEST TIGHTNESS: 0

## 2023-07-09 ENCOUNTER — HOSPITAL ENCOUNTER (EMERGENCY)
Age: 38
Discharge: HOME | End: 2023-07-09
Attending: STUDENT IN AN ORGANIZED HEALTH CARE EDUCATION/TRAINING PROGRAM
Payer: COMMERCIAL

## 2023-07-09 VITALS
SYSTOLIC BLOOD PRESSURE: 141 MMHG | TEMPERATURE: 98.5 F | BODY MASS INDEX: 41.99 KG/M2 | HEART RATE: 75 BPM | DIASTOLIC BLOOD PRESSURE: 84 MMHG | WEIGHT: 310 LBS | RESPIRATION RATE: 16 BRPM | OXYGEN SATURATION: 95 % | HEIGHT: 72 IN

## 2023-07-09 DIAGNOSIS — R10.13 EPIGASTRIC PAIN: Primary | ICD-10-CM

## 2023-07-09 LAB
ALBUMIN SERPL-MCNC: 3.7 G/DL (ref 3.5–5)
ALBUMIN/GLOB SERPL: 1.2 (ref 0.4–1.6)
ALP SERPL-CCNC: 135 U/L (ref 45–117)
ALT SERPL-CCNC: 37 U/L (ref 13–61)
ANION GAP SERPL CALC-SCNC: 9 MMOL/L (ref 2–11)
AST SERPL-CCNC: 22 U/L (ref 15–37)
BASOPHILS # BLD: 0 K/UL (ref 0–0.2)
BASOPHILS NFR BLD: 1 % (ref 0–2)
BILIRUB SERPL-MCNC: 0.3 MG/DL (ref 0.2–1.1)
BUN SERPL-MCNC: 14 MG/DL (ref 6–23)
CALCIUM SERPL-MCNC: 8.9 MG/DL (ref 8.3–10.4)
CHLORIDE SERPL-SCNC: 103 MMOL/L (ref 98–107)
CO2 SERPL-SCNC: 27 MMOL/L (ref 21–32)
CREAT SERPL-MCNC: 1.05 MG/DL (ref 0.8–1.5)
CRP SERPL-MCNC: 1.6 MG/DL (ref 0–0.9)
DIFFERENTIAL METHOD BLD: ABNORMAL
EOSINOPHIL # BLD: 0.2 K/UL (ref 0–0.8)
EOSINOPHIL NFR BLD: 3 % (ref 0.5–7.8)
ERYTHROCYTE [DISTWIDTH] IN BLOOD BY AUTOMATED COUNT: 13.9 % (ref 11.9–14.6)
GLOBULIN SER CALC-MCNC: 3 G/DL (ref 2.8–4.5)
GLUCOSE SERPL-MCNC: 212 MG/DL (ref 65–100)
HCT VFR BLD AUTO: 38.1 % (ref 41.1–50.3)
HGB BLD-MCNC: 12.4 G/DL (ref 13.6–17.2)
IMM GRANULOCYTES # BLD AUTO: 0 K/UL (ref 0–0.5)
IMM GRANULOCYTES NFR BLD AUTO: 1 % (ref 0–5)
LIPASE SERPL-CCNC: 20 U/L (ref 13–60)
LYMPHOCYTES # BLD: 1.2 K/UL (ref 0.5–4.6)
LYMPHOCYTES NFR BLD: 18 % (ref 13–44)
MAGNESIUM SERPL-MCNC: 1.6 MG/DL (ref 1.2–2.6)
MCH RBC QN AUTO: 25.5 PG (ref 26.1–32.9)
MCHC RBC AUTO-ENTMCNC: 32.5 G/DL (ref 31.4–35)
MCV RBC AUTO: 78.2 FL (ref 82–102)
MONOCYTES # BLD: 0.4 K/UL (ref 0.1–1.3)
MONOCYTES NFR BLD: 5 % (ref 4–12)
NEUTS SEG # BLD: 4.8 K/UL (ref 1.7–8.2)
NEUTS SEG NFR BLD: 72 % (ref 43–78)
NRBC # BLD: 0 K/UL (ref 0–0.2)
PLATELET # BLD AUTO: 246 K/UL (ref 150–450)
PMV BLD AUTO: 9.3 FL (ref 9.4–12.3)
POTASSIUM SERPL-SCNC: 3.7 MMOL/L (ref 3.5–5.1)
PROT SERPL-MCNC: 6.7 G/DL (ref 6.4–8.2)
RBC # BLD AUTO: 4.87 M/UL (ref 4.23–5.6)
SODIUM SERPL-SCNC: 139 MMOL/L (ref 133–143)
WBC # BLD AUTO: 6.6 K/UL (ref 4.3–11.1)

## 2023-07-09 PROCEDURE — 86140 C-REACTIVE PROTEIN: CPT

## 2023-07-09 PROCEDURE — 85025 COMPLETE CBC W/AUTO DIFF WBC: CPT

## 2023-07-09 PROCEDURE — 80053 COMPREHEN METABOLIC PANEL: CPT

## 2023-07-09 PROCEDURE — 6360000002 HC RX W HCPCS: Performed by: PHYSICIAN ASSISTANT

## 2023-07-09 PROCEDURE — 83690 ASSAY OF LIPASE: CPT

## 2023-07-09 PROCEDURE — 2580000003 HC RX 258: Performed by: PHYSICIAN ASSISTANT

## 2023-07-09 PROCEDURE — 83735 ASSAY OF MAGNESIUM: CPT

## 2023-07-09 PROCEDURE — 96374 THER/PROPH/DIAG INJ IV PUSH: CPT

## 2023-07-09 PROCEDURE — 99284 EMERGENCY DEPT VISIT MOD MDM: CPT

## 2023-07-09 PROCEDURE — 96375 TX/PRO/DX INJ NEW DRUG ADDON: CPT

## 2023-07-09 PROCEDURE — 96361 HYDRATE IV INFUSION ADD-ON: CPT

## 2023-07-09 PROCEDURE — 6370000000 HC RX 637 (ALT 250 FOR IP): Performed by: PHYSICIAN ASSISTANT

## 2023-07-09 RX ORDER — KETOROLAC TROMETHAMINE 30 MG/ML
30 INJECTION, SOLUTION INTRAMUSCULAR; INTRAVENOUS ONCE
Status: COMPLETED | OUTPATIENT
Start: 2023-07-09 | End: 2023-07-09

## 2023-07-09 RX ORDER — ONDANSETRON 2 MG/ML
4 INJECTION INTRAMUSCULAR; INTRAVENOUS
Status: COMPLETED | OUTPATIENT
Start: 2023-07-09 | End: 2023-07-09

## 2023-07-09 RX ORDER — 0.9 % SODIUM CHLORIDE 0.9 %
1000 INTRAVENOUS SOLUTION INTRAVENOUS
Status: COMPLETED | OUTPATIENT
Start: 2023-07-09 | End: 2023-07-09

## 2023-07-09 RX ADMIN — SODIUM CHLORIDE 1000 ML: 9 INJECTION, SOLUTION INTRAVENOUS at 18:42

## 2023-07-09 RX ADMIN — HYOSCYAMINE SULFATE 125 MCG: 0.12 TABLET ORAL; SUBLINGUAL at 18:43

## 2023-07-09 RX ADMIN — KETOROLAC TROMETHAMINE 30 MG: 30 INJECTION, SOLUTION INTRAMUSCULAR; INTRAVENOUS at 18:42

## 2023-07-09 RX ADMIN — ONDANSETRON 4 MG: 2 INJECTION INTRAMUSCULAR; INTRAVENOUS at 18:43

## 2023-07-09 ASSESSMENT — PAIN - FUNCTIONAL ASSESSMENT
PAIN_FUNCTIONAL_ASSESSMENT: 0-10
PAIN_FUNCTIONAL_ASSESSMENT: 0-10

## 2023-07-09 ASSESSMENT — LIFESTYLE VARIABLES
HOW MANY STANDARD DRINKS CONTAINING ALCOHOL DO YOU HAVE ON A TYPICAL DAY: PATIENT DOES NOT DRINK
HOW OFTEN DO YOU HAVE A DRINK CONTAINING ALCOHOL: NEVER

## 2023-07-09 ASSESSMENT — PAIN DESCRIPTION - LOCATION: LOCATION: ABDOMEN

## 2023-07-09 ASSESSMENT — PAIN SCALES - GENERAL
PAINLEVEL_OUTOF10: 7
PAINLEVEL_OUTOF10: 7
PAINLEVEL_OUTOF10: 8

## 2023-07-10 ENCOUNTER — HOSPITAL ENCOUNTER (INPATIENT)
Age: 38
LOS: 3 days | Discharge: HOME | DRG: 418 | End: 2023-07-15
Attending: FAMILY MEDICINE | Admitting: HOSPITALIST
Payer: COMMERCIAL

## 2023-07-10 ENCOUNTER — HOSPITAL ENCOUNTER (EMERGENCY)
Age: 38
Discharge: ANOTHER HOSPITAL | End: 2023-07-10
Attending: EMERGENCY MEDICINE
Payer: COMMERCIAL

## 2023-07-10 ENCOUNTER — APPOINTMENT (OUTPATIENT)
Dept: ULTRASOUND IMAGING | Age: 38
End: 2023-07-10
Payer: COMMERCIAL

## 2023-07-10 VITALS
TEMPERATURE: 98.6 F | DIASTOLIC BLOOD PRESSURE: 85 MMHG | HEART RATE: 65 BPM | OXYGEN SATURATION: 97 % | SYSTOLIC BLOOD PRESSURE: 145 MMHG | WEIGHT: 310 LBS | BODY MASS INDEX: 41.99 KG/M2 | HEIGHT: 72 IN | RESPIRATION RATE: 19 BRPM

## 2023-07-10 DIAGNOSIS — R74.8 ELEVATED LIPASE: ICD-10-CM

## 2023-07-10 DIAGNOSIS — R19.7 DIARRHEA, UNSPECIFIED TYPE: ICD-10-CM

## 2023-07-10 DIAGNOSIS — R10.11 RIGHT UPPER QUADRANT ABDOMINAL PAIN: Primary | ICD-10-CM

## 2023-07-10 DIAGNOSIS — K81.1 CHRONIC CHOLECYSTITIS: ICD-10-CM

## 2023-07-10 DIAGNOSIS — R11.0 NAUSEA: ICD-10-CM

## 2023-07-10 PROBLEM — E11.42 DIABETIC POLYNEUROPATHY ASSOCIATED WITH TYPE 2 DIABETES MELLITUS (HCC): Status: ACTIVE | Noted: 2019-08-21

## 2023-07-10 PROBLEM — I82.409 DEEP VENOUS THROMBOSIS (HCC): Status: RESOLVED | Noted: 2023-02-22 | Resolved: 2023-07-10

## 2023-07-10 PROBLEM — M25.512 BILATERAL SHOULDER PAIN: Status: RESOLVED | Noted: 2023-06-01 | Resolved: 2023-07-10

## 2023-07-10 PROBLEM — T42.4X2A SUICIDE ATTEMPT BY BENZODIAZEPINE OVERDOSE (HCC): Status: RESOLVED | Noted: 2020-10-26 | Resolved: 2023-07-10

## 2023-07-10 PROBLEM — M25.511 BILATERAL SHOULDER PAIN: Status: RESOLVED | Noted: 2023-06-01 | Resolved: 2023-07-10

## 2023-07-10 PROBLEM — D50.9 MICROCYTIC ANEMIA: Status: ACTIVE | Noted: 2023-07-10

## 2023-07-10 PROBLEM — G62.9 NEUROPATHY: Status: RESOLVED | Noted: 2023-04-26 | Resolved: 2023-07-10

## 2023-07-10 PROBLEM — E78.00 HYPERCHOLESTEROLEMIA: Status: ACTIVE | Noted: 2017-01-25

## 2023-07-10 LAB
ALBUMIN SERPL-MCNC: 3.9 G/DL (ref 3.5–5)
ALBUMIN/GLOB SERPL: 1.3 (ref 0.4–1.6)
ALP SERPL-CCNC: 130 U/L (ref 45–117)
ALT SERPL-CCNC: 39 U/L (ref 13–61)
ANION GAP SERPL CALC-SCNC: 9 MMOL/L (ref 2–11)
APPEARANCE UR: ABNORMAL
AST SERPL-CCNC: 28 U/L (ref 15–37)
BASOPHILS # BLD: 0 K/UL (ref 0–0.2)
BASOPHILS NFR BLD: 1 % (ref 0–2)
BILIRUB SERPL-MCNC: 0.3 MG/DL (ref 0.2–1.1)
BILIRUB UR QL: NEGATIVE
BUN SERPL-MCNC: 13 MG/DL (ref 6–23)
CALCIUM SERPL-MCNC: 8.9 MG/DL (ref 8.3–10.4)
CHLORIDE SERPL-SCNC: 104 MMOL/L (ref 98–107)
CO2 SERPL-SCNC: 26 MMOL/L (ref 21–32)
COLOR UR: YELLOW
CREAT SERPL-MCNC: 0.93 MG/DL (ref 0.8–1.5)
DIFFERENTIAL METHOD BLD: ABNORMAL
EOSINOPHIL # BLD: 0.2 K/UL (ref 0–0.8)
EOSINOPHIL NFR BLD: 3 % (ref 0.5–7.8)
ERYTHROCYTE [DISTWIDTH] IN BLOOD BY AUTOMATED COUNT: 14 % (ref 11.9–14.6)
GLOBULIN SER CALC-MCNC: 2.9 G/DL (ref 2.8–4.5)
GLUCOSE SERPL-MCNC: 143 MG/DL (ref 65–100)
GLUCOSE UR STRIP.AUTO-MCNC: NEGATIVE MG/DL
HCT VFR BLD AUTO: 37.7 % (ref 41.1–50.3)
HGB BLD-MCNC: 12.2 G/DL (ref 13.6–17.2)
HGB UR QL STRIP: NEGATIVE
IMM GRANULOCYTES # BLD AUTO: 0 K/UL (ref 0–0.5)
IMM GRANULOCYTES NFR BLD AUTO: 0 % (ref 0–5)
KETONES UR QL STRIP.AUTO: NEGATIVE MG/DL
LEUKOCYTE ESTERASE UR QL STRIP.AUTO: NEGATIVE
LIPASE SERPL-CCNC: 182 U/L (ref 13–60)
LYMPHOCYTES # BLD: 1.2 K/UL (ref 0.5–4.6)
LYMPHOCYTES NFR BLD: 18 % (ref 13–44)
MCH RBC QN AUTO: 25.3 PG (ref 26.1–32.9)
MCHC RBC AUTO-ENTMCNC: 32.4 G/DL (ref 31.4–35)
MCV RBC AUTO: 78.2 FL (ref 82–102)
MONOCYTES # BLD: 0.4 K/UL (ref 0.1–1.3)
MONOCYTES NFR BLD: 6 % (ref 4–12)
NEUTS SEG # BLD: 4.9 K/UL (ref 1.7–8.2)
NEUTS SEG NFR BLD: 74 % (ref 43–78)
NITRITE UR QL STRIP.AUTO: NEGATIVE
NRBC # BLD: 0 K/UL (ref 0–0.2)
PH UR STRIP: 5.5 (ref 5–9)
PLATELET # BLD AUTO: 256 K/UL (ref 150–450)
PMV BLD AUTO: 9.6 FL (ref 9.4–12.3)
POTASSIUM SERPL-SCNC: 4.4 MMOL/L (ref 3.5–5.1)
PROT SERPL-MCNC: 6.8 G/DL (ref 6.4–8.2)
PROT UR STRIP-MCNC: NEGATIVE MG/DL
RBC # BLD AUTO: 4.82 M/UL (ref 4.23–5.6)
SODIUM SERPL-SCNC: 139 MMOL/L (ref 133–143)
SP GR UR REFRACTOMETRY: 1.02 (ref 1–1.02)
UROBILINOGEN UR QL STRIP.AUTO: 0.2 EU/DL (ref 0.2–1)
WBC # BLD AUTO: 6.7 K/UL (ref 4.3–11.1)

## 2023-07-10 PROCEDURE — A4216 STERILE WATER/SALINE, 10 ML: HCPCS | Performed by: FAMILY MEDICINE

## 2023-07-10 PROCEDURE — C9113 INJ PANTOPRAZOLE SODIUM, VIA: HCPCS | Performed by: FAMILY MEDICINE

## 2023-07-10 PROCEDURE — 6360000002 HC RX W HCPCS: Performed by: FAMILY MEDICINE

## 2023-07-10 PROCEDURE — 96374 THER/PROPH/DIAG INJ IV PUSH: CPT

## 2023-07-10 PROCEDURE — 81003 URINALYSIS AUTO W/O SCOPE: CPT

## 2023-07-10 PROCEDURE — 85025 COMPLETE CBC W/AUTO DIFF WBC: CPT

## 2023-07-10 PROCEDURE — 76705 ECHO EXAM OF ABDOMEN: CPT

## 2023-07-10 PROCEDURE — G0378 HOSPITAL OBSERVATION PER HR: HCPCS

## 2023-07-10 PROCEDURE — 6360000002 HC RX W HCPCS: Performed by: STUDENT IN AN ORGANIZED HEALTH CARE EDUCATION/TRAINING PROGRAM

## 2023-07-10 PROCEDURE — 96375 TX/PRO/DX INJ NEW DRUG ADDON: CPT

## 2023-07-10 PROCEDURE — 2580000003 HC RX 258: Performed by: STUDENT IN AN ORGANIZED HEALTH CARE EDUCATION/TRAINING PROGRAM

## 2023-07-10 PROCEDURE — 83690 ASSAY OF LIPASE: CPT

## 2023-07-10 PROCEDURE — 99285 EMERGENCY DEPT VISIT HI MDM: CPT

## 2023-07-10 PROCEDURE — 82962 GLUCOSE BLOOD TEST: CPT

## 2023-07-10 PROCEDURE — 96361 HYDRATE IV INFUSION ADD-ON: CPT

## 2023-07-10 PROCEDURE — 2580000003 HC RX 258: Performed by: FAMILY MEDICINE

## 2023-07-10 PROCEDURE — 96376 TX/PRO/DX INJ SAME DRUG ADON: CPT

## 2023-07-10 PROCEDURE — 6370000000 HC RX 637 (ALT 250 FOR IP): Performed by: FAMILY MEDICINE

## 2023-07-10 PROCEDURE — 6370000000 HC RX 637 (ALT 250 FOR IP): Performed by: STUDENT IN AN ORGANIZED HEALTH CARE EDUCATION/TRAINING PROGRAM

## 2023-07-10 PROCEDURE — 80053 COMPREHEN METABOLIC PANEL: CPT

## 2023-07-10 RX ORDER — SODIUM CHLORIDE 0.9 % (FLUSH) 0.9 %
5-40 SYRINGE (ML) INJECTION PRN
Status: DISCONTINUED | OUTPATIENT
Start: 2023-07-10 | End: 2023-07-15 | Stop reason: HOSPADM

## 2023-07-10 RX ORDER — KETOROLAC TROMETHAMINE 15 MG/ML
15 INJECTION, SOLUTION INTRAMUSCULAR; INTRAVENOUS ONCE
Status: COMPLETED | OUTPATIENT
Start: 2023-07-10 | End: 2023-07-10

## 2023-07-10 RX ORDER — DICYCLOMINE HCL 20 MG
20 TABLET ORAL
Status: DISCONTINUED | OUTPATIENT
Start: 2023-07-10 | End: 2023-07-15 | Stop reason: HOSPADM

## 2023-07-10 RX ORDER — ACETAMINOPHEN 650 MG/1
650 SUPPOSITORY RECTAL EVERY 6 HOURS PRN
Status: DISCONTINUED | OUTPATIENT
Start: 2023-07-10 | End: 2023-07-15 | Stop reason: HOSPADM

## 2023-07-10 RX ORDER — ACETAMINOPHEN 325 MG/1
650 TABLET ORAL EVERY 6 HOURS PRN
Status: DISCONTINUED | OUTPATIENT
Start: 2023-07-10 | End: 2023-07-15 | Stop reason: HOSPADM

## 2023-07-10 RX ORDER — DULOXETIN HYDROCHLORIDE 30 MG/1
30 CAPSULE, DELAYED RELEASE ORAL DAILY
Status: DISCONTINUED | OUTPATIENT
Start: 2023-07-11 | End: 2023-07-15 | Stop reason: HOSPADM

## 2023-07-10 RX ORDER — INSULIN LISPRO 100 [IU]/ML
0-16 INJECTION, SOLUTION INTRAVENOUS; SUBCUTANEOUS
Status: DISCONTINUED | OUTPATIENT
Start: 2023-07-11 | End: 2023-07-15 | Stop reason: HOSPADM

## 2023-07-10 RX ORDER — ATORVASTATIN CALCIUM 40 MG/1
80 TABLET, FILM COATED ORAL DAILY
Status: DISCONTINUED | OUTPATIENT
Start: 2023-07-11 | End: 2023-07-15 | Stop reason: HOSPADM

## 2023-07-10 RX ORDER — ONDANSETRON 2 MG/ML
4 INJECTION INTRAMUSCULAR; INTRAVENOUS
Status: COMPLETED | OUTPATIENT
Start: 2023-07-10 | End: 2023-07-10

## 2023-07-10 RX ORDER — ENOXAPARIN SODIUM 100 MG/ML
30 INJECTION SUBCUTANEOUS EVERY 12 HOURS
Status: DISCONTINUED | OUTPATIENT
Start: 2023-07-11 | End: 2023-07-15 | Stop reason: HOSPADM

## 2023-07-10 RX ORDER — ONDANSETRON 2 MG/ML
4 INJECTION INTRAMUSCULAR; INTRAVENOUS EVERY 6 HOURS PRN
Status: DISCONTINUED | OUTPATIENT
Start: 2023-07-10 | End: 2023-07-11

## 2023-07-10 RX ORDER — MORPHINE SULFATE 2 MG/ML
4 INJECTION, SOLUTION INTRAMUSCULAR; INTRAVENOUS EVERY 4 HOURS PRN
Status: DISCONTINUED | OUTPATIENT
Start: 2023-07-10 | End: 2023-07-15 | Stop reason: HOSPADM

## 2023-07-10 RX ORDER — DEXTROSE MONOHYDRATE 100 MG/ML
INJECTION, SOLUTION INTRAVENOUS CONTINUOUS PRN
Status: DISCONTINUED | OUTPATIENT
Start: 2023-07-10 | End: 2023-07-15 | Stop reason: HOSPADM

## 2023-07-10 RX ORDER — MAGNESIUM SULFATE IN WATER 40 MG/ML
2000 INJECTION, SOLUTION INTRAVENOUS PRN
Status: DISCONTINUED | OUTPATIENT
Start: 2023-07-10 | End: 2023-07-15 | Stop reason: HOSPADM

## 2023-07-10 RX ORDER — SODIUM CHLORIDE 0.9 % (FLUSH) 0.9 %
5-40 SYRINGE (ML) INJECTION EVERY 12 HOURS SCHEDULED
Status: DISCONTINUED | OUTPATIENT
Start: 2023-07-10 | End: 2023-07-15 | Stop reason: HOSPADM

## 2023-07-10 RX ORDER — ONDANSETRON 4 MG/1
4 TABLET, ORALLY DISINTEGRATING ORAL EVERY 8 HOURS PRN
Status: DISCONTINUED | OUTPATIENT
Start: 2023-07-10 | End: 2023-07-11

## 2023-07-10 RX ORDER — INSULIN GLARGINE 100 [IU]/ML
24 INJECTION, SOLUTION SUBCUTANEOUS DAILY
Status: DISCONTINUED | OUTPATIENT
Start: 2023-07-11 | End: 2023-07-12

## 2023-07-10 RX ORDER — SODIUM CHLORIDE, SODIUM LACTATE, POTASSIUM CHLORIDE, CALCIUM CHLORIDE 600; 310; 30; 20 MG/100ML; MG/100ML; MG/100ML; MG/100ML
INJECTION, SOLUTION INTRAVENOUS CONTINUOUS
Status: DISCONTINUED | OUTPATIENT
Start: 2023-07-10 | End: 2023-07-14

## 2023-07-10 RX ORDER — POLYETHYLENE GLYCOL 3350 17 G/17G
17 POWDER, FOR SOLUTION ORAL DAILY PRN
Status: DISCONTINUED | OUTPATIENT
Start: 2023-07-10 | End: 2023-07-15 | Stop reason: HOSPADM

## 2023-07-10 RX ORDER — TRAMADOL HYDROCHLORIDE 50 MG/1
50 TABLET ORAL EVERY 6 HOURS PRN
Status: DISCONTINUED | OUTPATIENT
Start: 2023-07-10 | End: 2023-07-15 | Stop reason: HOSPADM

## 2023-07-10 RX ORDER — POTASSIUM CHLORIDE 7.45 MG/ML
10 INJECTION INTRAVENOUS PRN
Status: DISCONTINUED | OUTPATIENT
Start: 2023-07-10 | End: 2023-07-15 | Stop reason: HOSPADM

## 2023-07-10 RX ORDER — INSULIN LISPRO 100 [IU]/ML
0-4 INJECTION, SOLUTION INTRAVENOUS; SUBCUTANEOUS NIGHTLY
Status: DISCONTINUED | OUTPATIENT
Start: 2023-07-10 | End: 2023-07-15 | Stop reason: HOSPADM

## 2023-07-10 RX ORDER — POTASSIUM CHLORIDE 20 MEQ/1
40 TABLET, EXTENDED RELEASE ORAL PRN
Status: DISCONTINUED | OUTPATIENT
Start: 2023-07-10 | End: 2023-07-15 | Stop reason: HOSPADM

## 2023-07-10 RX ORDER — SODIUM CHLORIDE 9 MG/ML
INJECTION, SOLUTION INTRAVENOUS PRN
Status: DISCONTINUED | OUTPATIENT
Start: 2023-07-10 | End: 2023-07-15 | Stop reason: HOSPADM

## 2023-07-10 RX ORDER — SUCRALFATE 1 G/1
1 TABLET ORAL EVERY 6 HOURS SCHEDULED
Status: DISCONTINUED | OUTPATIENT
Start: 2023-07-10 | End: 2023-07-15 | Stop reason: HOSPADM

## 2023-07-10 RX ORDER — MORPHINE SULFATE 4 MG/ML
4 INJECTION INTRAVENOUS ONCE
Status: COMPLETED | OUTPATIENT
Start: 2023-07-10 | End: 2023-07-10

## 2023-07-10 RX ORDER — 0.9 % SODIUM CHLORIDE 0.9 %
1000 INTRAVENOUS SOLUTION INTRAVENOUS
Status: COMPLETED | OUTPATIENT
Start: 2023-07-10 | End: 2023-07-10

## 2023-07-10 RX ORDER — MORPHINE SULFATE 4 MG/ML
6 INJECTION INTRAVENOUS ONCE
Status: COMPLETED | OUTPATIENT
Start: 2023-07-10 | End: 2023-07-10

## 2023-07-10 RX ADMIN — ONDANSETRON 4 MG: 4 TABLET, ORALLY DISINTEGRATING ORAL at 23:16

## 2023-07-10 RX ADMIN — SUCRALFATE 1 G: 1 TABLET ORAL at 23:16

## 2023-07-10 RX ADMIN — SODIUM CHLORIDE, POTASSIUM CHLORIDE, SODIUM LACTATE AND CALCIUM CHLORIDE: 600; 310; 30; 20 INJECTION, SOLUTION INTRAVENOUS at 21:04

## 2023-07-10 RX ADMIN — SODIUM CHLORIDE, PRESERVATIVE FREE 40 MG: 5 INJECTION INTRAVENOUS at 21:04

## 2023-07-10 RX ADMIN — MORPHINE SULFATE 4 MG: 4 INJECTION, SOLUTION INTRAMUSCULAR; INTRAVENOUS at 17:42

## 2023-07-10 RX ADMIN — MORPHINE SULFATE 6 MG: 4 INJECTION, SOLUTION INTRAMUSCULAR; INTRAVENOUS at 14:14

## 2023-07-10 RX ADMIN — ONDANSETRON 4 MG: 2 INJECTION INTRAMUSCULAR; INTRAVENOUS at 14:13

## 2023-07-10 RX ADMIN — SODIUM CHLORIDE, PRESERVATIVE FREE 10 ML: 5 INJECTION INTRAVENOUS at 21:04

## 2023-07-10 RX ADMIN — DICYCLOMINE HYDROCHLORIDE 20 MG: 20 TABLET ORAL at 23:17

## 2023-07-10 RX ADMIN — SODIUM CHLORIDE 1000 ML: 9 INJECTION, SOLUTION INTRAVENOUS at 14:12

## 2023-07-10 RX ADMIN — KETOROLAC TROMETHAMINE 15 MG: 15 INJECTION, SOLUTION INTRAMUSCULAR; INTRAVENOUS at 15:12

## 2023-07-10 RX ADMIN — HYOSCYAMINE SULFATE 250 MCG: 0.12 TABLET ORAL; SUBLINGUAL at 15:13

## 2023-07-10 RX ADMIN — TRAMADOL HYDROCHLORIDE 50 MG: 50 TABLET ORAL at 23:01

## 2023-07-10 ASSESSMENT — PAIN DESCRIPTION - ORIENTATION
ORIENTATION: RIGHT
ORIENTATION: RIGHT;UPPER
ORIENTATION: RIGHT

## 2023-07-10 ASSESSMENT — ENCOUNTER SYMPTOMS
ABDOMINAL PAIN: 1
PHOTOPHOBIA: 0
VOMITING: 1
COUGH: 0
DIARRHEA: 1
TROUBLE SWALLOWING: 0
NAUSEA: 1
SHORTNESS OF BREATH: 0
FACIAL SWELLING: 0

## 2023-07-10 ASSESSMENT — PAIN SCALES - GENERAL
PAINLEVEL_OUTOF10: 9
PAINLEVEL_OUTOF10: 6
PAINLEVEL_OUTOF10: 4
PAINLEVEL_OUTOF10: 4

## 2023-07-10 ASSESSMENT — PAIN - FUNCTIONAL ASSESSMENT
PAIN_FUNCTIONAL_ASSESSMENT: 0-10
PAIN_FUNCTIONAL_ASSESSMENT: 0-10
PAIN_FUNCTIONAL_ASSESSMENT: ACTIVITIES ARE NOT PREVENTED

## 2023-07-10 ASSESSMENT — PAIN DESCRIPTION - DESCRIPTORS: DESCRIPTORS: ACHING;DISCOMFORT;SHARP

## 2023-07-10 ASSESSMENT — PAIN DESCRIPTION - LOCATION
LOCATION: ABDOMEN

## 2023-07-10 NOTE — ED TRIAGE NOTES
Pt ambulatory to triage. Pt reports RUQ pain, vomiting and diarrhea. Pt states he was here yesterday and last Sunday for same concern. Pt states he had a CT scan last Sunday.

## 2023-07-10 NOTE — ED PROVIDER NOTES
Emergency Department Provider Note       PCP: ZULLY Whitney - JESSICA   Age: 42 y.o. Sex: male     DISPOSITION Decision To Transfer 07/10/2023 05:09:47 PM       ICD-10-CM    1. Right upper quadrant abdominal pain  R10.11       2. Nausea  R11.0       3. Diarrhea, unspecified type  R19.7       4. Elevated lipase  R74.8           Medical Decision Making     Complexity of Problems Addressed:  1 or more acute illnesses that pose a threat to life or bodily function. Data Reviewed and Analyzed:  Category 1:   I independently ordered and reviewed each unique test.  I reviewed external records: provider visit note from PCP. I reviewed external records: previous imaging study including radiologist interpretation. Category 2:   I interpreted the Ultrasound  normal.  RADIOLOGY DISCLAIMER: Although I do my best to interpret the imaging, I am not a board-certified radiologist.  I am still basing my medical decision making off of the board-certified radiology interpretation provided to me. Category 3: Discussion of management or test interpretation. In summary this is a 38-year-old male patient who presented for evaluation of right upper quadrant abdominal pain ongoing for the past 3 weeks associated with vomiting and diarrhea. Upon initial exam he was quite tender in the right upper quadrant. This was his third visit for the same complaint and reviewed charts showed prior negative CT. Had not had an ultrasound. I did have concern for gallbladder pathology so ultrasound was ordered. Ultrasound today was unremarkable. Lab work was remarkable for elevated lipase and elevated alkaline phosphatase. After work-up today, I do still have concern for gallbladder pathology and do feel this patient should be admitted for further work-up and possible HIDA scan. I discussed this with the on-call gastroenterologist Dr. Osorio Ames. He advised having hospitalist admit.   I consulted our hospitalist Dr. Nadir Key

## 2023-07-11 ENCOUNTER — ANESTHESIA (OUTPATIENT)
Dept: ENDOSCOPY | Age: 38
End: 2023-07-11
Payer: COMMERCIAL

## 2023-07-11 ENCOUNTER — ANESTHESIA EVENT (OUTPATIENT)
Dept: ENDOSCOPY | Age: 38
End: 2023-07-11
Payer: COMMERCIAL

## 2023-07-11 LAB
ALBUMIN SERPL-MCNC: 2.9 G/DL (ref 3.5–5)
ALBUMIN/GLOB SERPL: 0.7 (ref 0.4–1.6)
ALP SERPL-CCNC: 133 U/L (ref 50–136)
ALT SERPL-CCNC: 46 U/L (ref 12–65)
ANION GAP SERPL CALC-SCNC: 2 MMOL/L (ref 2–11)
AST SERPL-CCNC: 40 U/L (ref 15–37)
BASOPHILS # BLD: 0 K/UL (ref 0–0.2)
BASOPHILS NFR BLD: 0 % (ref 0–2)
BILIRUB SERPL-MCNC: 0.5 MG/DL (ref 0.2–1.1)
BUN SERPL-MCNC: 11 MG/DL (ref 6–23)
CALCIUM SERPL-MCNC: 8.6 MG/DL (ref 8.3–10.4)
CHLORIDE SERPL-SCNC: 105 MMOL/L (ref 101–110)
CO2 SERPL-SCNC: 28 MMOL/L (ref 21–32)
CREAT SERPL-MCNC: 1 MG/DL (ref 0.8–1.5)
DIFFERENTIAL METHOD BLD: ABNORMAL
EOSINOPHIL # BLD: 0.2 K/UL (ref 0–0.8)
EOSINOPHIL NFR BLD: 3 % (ref 0.5–7.8)
ERYTHROCYTE [DISTWIDTH] IN BLOOD BY AUTOMATED COUNT: 14.4 % (ref 11.9–14.6)
FERRITIN SERPL-MCNC: 42 NG/ML (ref 8–388)
GLOBULIN SER CALC-MCNC: 4.1 G/DL (ref 2.8–4.5)
GLUCOSE BLD STRIP.AUTO-MCNC: 128 MG/DL (ref 65–100)
GLUCOSE BLD STRIP.AUTO-MCNC: 136 MG/DL (ref 65–100)
GLUCOSE BLD STRIP.AUTO-MCNC: 138 MG/DL (ref 65–100)
GLUCOSE BLD STRIP.AUTO-MCNC: 99 MG/DL (ref 65–100)
GLUCOSE SERPL-MCNC: 138 MG/DL (ref 65–100)
HCT VFR BLD AUTO: 39.3 % (ref 41.1–50.3)
HGB BLD-MCNC: 12.4 G/DL (ref 13.6–17.2)
IMM GRANULOCYTES # BLD AUTO: 0 K/UL (ref 0–0.5)
IMM GRANULOCYTES NFR BLD AUTO: 0 % (ref 0–5)
IRON SATN MFR SERPL: 13 %
IRON SERPL-MCNC: 47 UG/DL (ref 35–150)
LIPASE SERPL-CCNC: 52 U/L (ref 73–393)
LYMPHOCYTES # BLD: 1 K/UL (ref 0.5–4.6)
LYMPHOCYTES NFR BLD: 15 % (ref 13–44)
MAGNESIUM SERPL-MCNC: 1.8 MG/DL (ref 1.8–2.4)
MCH RBC QN AUTO: 24.8 PG (ref 26.1–32.9)
MCHC RBC AUTO-ENTMCNC: 31.6 G/DL (ref 31.4–35)
MCV RBC AUTO: 78.8 FL (ref 82–102)
MONOCYTES # BLD: 0.4 K/UL (ref 0.1–1.3)
MONOCYTES NFR BLD: 6 % (ref 4–12)
NEUTS SEG # BLD: 5.3 K/UL (ref 1.7–8.2)
NEUTS SEG NFR BLD: 76 % (ref 43–78)
NRBC # BLD: 0 K/UL (ref 0–0.2)
PLATELET # BLD AUTO: 259 K/UL (ref 150–450)
PMV BLD AUTO: 9.9 FL (ref 9.4–12.3)
POTASSIUM SERPL-SCNC: 4.4 MMOL/L (ref 3.5–5.1)
PROT SERPL-MCNC: 7 G/DL (ref 6.3–8.2)
RBC # BLD AUTO: 4.99 M/UL (ref 4.23–5.6)
SERVICE CMNT-IMP: ABNORMAL
SERVICE CMNT-IMP: NORMAL
SODIUM SERPL-SCNC: 135 MMOL/L (ref 133–143)
TIBC SERPL-MCNC: 356 UG/DL (ref 250–450)
WBC # BLD AUTO: 6.9 K/UL (ref 4.3–11.1)

## 2023-07-11 PROCEDURE — 83540 ASSAY OF IRON: CPT

## 2023-07-11 PROCEDURE — 6360000002 HC RX W HCPCS: Performed by: FAMILY MEDICINE

## 2023-07-11 PROCEDURE — 83690 ASSAY OF LIPASE: CPT

## 2023-07-11 PROCEDURE — 6360000002 HC RX W HCPCS

## 2023-07-11 PROCEDURE — 83550 IRON BINDING TEST: CPT

## 2023-07-11 PROCEDURE — 82728 ASSAY OF FERRITIN: CPT

## 2023-07-11 PROCEDURE — 85025 COMPLETE CBC W/AUTO DIFF WBC: CPT

## 2023-07-11 PROCEDURE — 96375 TX/PRO/DX INJ NEW DRUG ADDON: CPT

## 2023-07-11 PROCEDURE — 7100000001 HC PACU RECOVERY - ADDTL 15 MIN: Performed by: INTERNAL MEDICINE

## 2023-07-11 PROCEDURE — 6360000002 HC RX W HCPCS: Performed by: INTERNAL MEDICINE

## 2023-07-11 PROCEDURE — A4216 STERILE WATER/SALINE, 10 ML: HCPCS | Performed by: FAMILY MEDICINE

## 2023-07-11 PROCEDURE — G0378 HOSPITAL OBSERVATION PER HR: HCPCS

## 2023-07-11 PROCEDURE — 80053 COMPREHEN METABOLIC PANEL: CPT

## 2023-07-11 PROCEDURE — 0DJ08ZZ INSPECTION OF UPPER INTESTINAL TRACT, VIA NATURAL OR ARTIFICIAL OPENING ENDOSCOPIC: ICD-10-PCS | Performed by: INTERNAL MEDICINE

## 2023-07-11 PROCEDURE — 6370000000 HC RX 637 (ALT 250 FOR IP): Performed by: STUDENT IN AN ORGANIZED HEALTH CARE EDUCATION/TRAINING PROGRAM

## 2023-07-11 PROCEDURE — 2580000003 HC RX 258: Performed by: ANESTHESIOLOGY

## 2023-07-11 PROCEDURE — 7100000000 HC PACU RECOVERY - FIRST 15 MIN: Performed by: INTERNAL MEDICINE

## 2023-07-11 PROCEDURE — 36415 COLL VENOUS BLD VENIPUNCTURE: CPT

## 2023-07-11 PROCEDURE — C9113 INJ PANTOPRAZOLE SODIUM, VIA: HCPCS | Performed by: FAMILY MEDICINE

## 2023-07-11 PROCEDURE — 96372 THER/PROPH/DIAG INJ SC/IM: CPT

## 2023-07-11 PROCEDURE — 6370000000 HC RX 637 (ALT 250 FOR IP): Performed by: FAMILY MEDICINE

## 2023-07-11 PROCEDURE — 3700000000 HC ANESTHESIA ATTENDED CARE: Performed by: INTERNAL MEDICINE

## 2023-07-11 PROCEDURE — 96376 TX/PRO/DX INJ SAME DRUG ADON: CPT

## 2023-07-11 PROCEDURE — 2580000003 HC RX 258: Performed by: FAMILY MEDICINE

## 2023-07-11 PROCEDURE — 83735 ASSAY OF MAGNESIUM: CPT

## 2023-07-11 PROCEDURE — 2709999900 HC NON-CHARGEABLE SUPPLY: Performed by: INTERNAL MEDICINE

## 2023-07-11 PROCEDURE — 76937 US GUIDE VASCULAR ACCESS: CPT

## 2023-07-11 PROCEDURE — 3700000001 HC ADD 15 MINUTES (ANESTHESIA): Performed by: INTERNAL MEDICINE

## 2023-07-11 PROCEDURE — 3609017100 HC EGD: Performed by: INTERNAL MEDICINE

## 2023-07-11 RX ORDER — ONDANSETRON 2 MG/ML
4 INJECTION INTRAMUSCULAR; INTRAVENOUS
Status: DISPENSED | OUTPATIENT
Start: 2023-07-11 | End: 2023-07-12

## 2023-07-11 RX ORDER — MIDAZOLAM HYDROCHLORIDE 2 MG/2ML
2 INJECTION, SOLUTION INTRAMUSCULAR; INTRAVENOUS
Status: CANCELLED | OUTPATIENT
Start: 2023-07-11 | End: 2023-07-12

## 2023-07-11 RX ORDER — SODIUM CHLORIDE 0.9 % (FLUSH) 0.9 %
5-40 SYRINGE (ML) INJECTION EVERY 12 HOURS SCHEDULED
Status: DISCONTINUED | OUTPATIENT
Start: 2023-07-11 | End: 2023-07-11 | Stop reason: HOSPADM

## 2023-07-11 RX ORDER — HYDROMORPHONE HYDROCHLORIDE 2 MG/ML
0.5 INJECTION, SOLUTION INTRAMUSCULAR; INTRAVENOUS; SUBCUTANEOUS EVERY 5 MIN PRN
Status: COMPLETED | OUTPATIENT
Start: 2023-07-11 | End: 2023-07-13

## 2023-07-11 RX ORDER — ONDANSETRON 4 MG/1
4 TABLET, ORALLY DISINTEGRATING ORAL EVERY 4 HOURS PRN
Status: DISCONTINUED | OUTPATIENT
Start: 2023-07-11 | End: 2023-07-15 | Stop reason: HOSPADM

## 2023-07-11 RX ORDER — ONDANSETRON 2 MG/ML
4 INJECTION INTRAMUSCULAR; INTRAVENOUS EVERY 4 HOURS PRN
Status: DISCONTINUED | OUTPATIENT
Start: 2023-07-11 | End: 2023-07-15 | Stop reason: HOSPADM

## 2023-07-11 RX ORDER — SODIUM CHLORIDE, SODIUM LACTATE, POTASSIUM CHLORIDE, CALCIUM CHLORIDE 600; 310; 30; 20 MG/100ML; MG/100ML; MG/100ML; MG/100ML
INJECTION, SOLUTION INTRAVENOUS CONTINUOUS
Status: CANCELLED | OUTPATIENT
Start: 2023-07-11

## 2023-07-11 RX ORDER — OXYCODONE HYDROCHLORIDE 5 MG/1
5 TABLET ORAL
Status: ACTIVE | OUTPATIENT
Start: 2023-07-11 | End: 2023-07-12

## 2023-07-11 RX ORDER — MIDAZOLAM HYDROCHLORIDE 1 MG/ML
2 INJECTION INTRAMUSCULAR; INTRAVENOUS ONCE
Status: DISCONTINUED | OUTPATIENT
Start: 2023-07-11 | End: 2023-07-15 | Stop reason: HOSPADM

## 2023-07-11 RX ORDER — SUCCINYLCHOLINE CHLORIDE 20 MG/ML
INJECTION INTRAMUSCULAR; INTRAVENOUS PRN
Status: DISCONTINUED | OUTPATIENT
Start: 2023-07-11 | End: 2023-07-11 | Stop reason: SDUPTHER

## 2023-07-11 RX ORDER — FENTANYL CITRATE 50 UG/ML
100 INJECTION, SOLUTION INTRAMUSCULAR; INTRAVENOUS PRN
Status: CANCELLED | OUTPATIENT
Start: 2023-07-11

## 2023-07-11 RX ORDER — PROPOFOL 10 MG/ML
INJECTION, EMULSION INTRAVENOUS PRN
Status: DISCONTINUED | OUTPATIENT
Start: 2023-07-11 | End: 2023-07-11 | Stop reason: SDUPTHER

## 2023-07-11 RX ORDER — SODIUM CHLORIDE 9 MG/ML
25 INJECTION, SOLUTION INTRAVENOUS PRN
Status: DISCONTINUED | OUTPATIENT
Start: 2023-07-11 | End: 2023-07-11 | Stop reason: HOSPADM

## 2023-07-11 RX ORDER — MIDAZOLAM HYDROCHLORIDE 1 MG/ML
INJECTION INTRAMUSCULAR; INTRAVENOUS PRN
Status: DISCONTINUED | OUTPATIENT
Start: 2023-07-11 | End: 2023-07-11 | Stop reason: SDUPTHER

## 2023-07-11 RX ORDER — HYDRALAZINE HYDROCHLORIDE 10 MG/1
25 TABLET, FILM COATED ORAL EVERY 6 HOURS PRN
Status: DISCONTINUED | OUTPATIENT
Start: 2023-07-11 | End: 2023-07-15 | Stop reason: HOSPADM

## 2023-07-11 RX ORDER — SODIUM CHLORIDE 0.9 % (FLUSH) 0.9 %
5-40 SYRINGE (ML) INJECTION PRN
Status: DISCONTINUED | OUTPATIENT
Start: 2023-07-11 | End: 2023-07-11 | Stop reason: HOSPADM

## 2023-07-11 RX ORDER — LIDOCAINE HYDROCHLORIDE 10 MG/ML
1 INJECTION, SOLUTION INFILTRATION; PERINEURAL
Status: CANCELLED | OUTPATIENT
Start: 2023-07-11 | End: 2023-07-12

## 2023-07-11 RX ORDER — FENTANYL CITRATE 50 UG/ML
50 INJECTION, SOLUTION INTRAMUSCULAR; INTRAVENOUS PRN
Status: CANCELLED | OUTPATIENT
Start: 2023-07-11

## 2023-07-11 RX ORDER — SODIUM CHLORIDE, SODIUM LACTATE, POTASSIUM CHLORIDE, CALCIUM CHLORIDE 600; 310; 30; 20 MG/100ML; MG/100ML; MG/100ML; MG/100ML
INJECTION, SOLUTION INTRAVENOUS CONTINUOUS
Status: DISCONTINUED | OUTPATIENT
Start: 2023-07-11 | End: 2023-07-13

## 2023-07-11 RX ORDER — MIDAZOLAM HYDROCHLORIDE 1 MG/ML
2 INJECTION INTRAMUSCULAR; INTRAVENOUS ONCE
Status: DISCONTINUED | OUTPATIENT
Start: 2023-07-11 | End: 2023-07-11

## 2023-07-11 RX ADMIN — Medication 160 MG: at 16:39

## 2023-07-11 RX ADMIN — SODIUM CHLORIDE, PRESERVATIVE FREE 10 ML: 5 INJECTION INTRAVENOUS at 20:39

## 2023-07-11 RX ADMIN — SODIUM CHLORIDE, PRESERVATIVE FREE 40 MG: 5 INJECTION INTRAVENOUS at 20:39

## 2023-07-11 RX ADMIN — ONDANSETRON 4 MG: 2 INJECTION INTRAMUSCULAR; INTRAVENOUS at 23:35

## 2023-07-11 RX ADMIN — PROPOFOL 200 MG: 10 INJECTION, EMULSION INTRAVENOUS at 16:39

## 2023-07-11 RX ADMIN — SODIUM CHLORIDE, POTASSIUM CHLORIDE, SODIUM LACTATE AND CALCIUM CHLORIDE: 600; 310; 30; 20 INJECTION, SOLUTION INTRAVENOUS at 18:25

## 2023-07-11 RX ADMIN — SUCRALFATE 1 G: 1 TABLET ORAL at 22:36

## 2023-07-11 RX ADMIN — ATORVASTATIN CALCIUM 80 MG: 40 TABLET, FILM COATED ORAL at 08:12

## 2023-07-11 RX ADMIN — DICYCLOMINE HYDROCHLORIDE 20 MG: 20 TABLET ORAL at 20:40

## 2023-07-11 RX ADMIN — DULOXETINE HYDROCHLORIDE 30 MG: 30 CAPSULE, DELAYED RELEASE ORAL at 08:12

## 2023-07-11 RX ADMIN — ONDANSETRON 4 MG: 2 INJECTION INTRAMUSCULAR; INTRAVENOUS at 13:03

## 2023-07-11 RX ADMIN — SODIUM CHLORIDE, PRESERVATIVE FREE 10 ML: 5 INJECTION INTRAVENOUS at 08:32

## 2023-07-11 RX ADMIN — SUCRALFATE 1 G: 1 TABLET ORAL at 17:50

## 2023-07-11 RX ADMIN — SUCRALFATE 1 G: 1 TABLET ORAL at 06:13

## 2023-07-11 RX ADMIN — ONDANSETRON 4 MG: 2 INJECTION INTRAMUSCULAR; INTRAVENOUS at 16:48

## 2023-07-11 RX ADMIN — MORPHINE SULFATE 4 MG: 2 INJECTION, SOLUTION INTRAMUSCULAR; INTRAVENOUS at 08:10

## 2023-07-11 RX ADMIN — INSULIN GLARGINE 24 UNITS: 100 INJECTION, SOLUTION SUBCUTANEOUS at 08:12

## 2023-07-11 RX ADMIN — SODIUM CHLORIDE, PRESERVATIVE FREE 40 MG: 5 INJECTION INTRAVENOUS at 08:11

## 2023-07-11 RX ADMIN — ENOXAPARIN SODIUM 30 MG: 100 INJECTION SUBCUTANEOUS at 08:11

## 2023-07-11 RX ADMIN — ONDANSETRON 4 MG: 2 INJECTION INTRAMUSCULAR; INTRAVENOUS at 03:47

## 2023-07-11 RX ADMIN — HYDRALAZINE HYDROCHLORIDE 25 MG: 10 TABLET, FILM COATED ORAL at 18:24

## 2023-07-11 RX ADMIN — MIDAZOLAM 2 MG: 1 INJECTION INTRAMUSCULAR; INTRAVENOUS at 16:35

## 2023-07-11 RX ADMIN — DICYCLOMINE HYDROCHLORIDE 20 MG: 20 TABLET ORAL at 11:45

## 2023-07-11 RX ADMIN — DICYCLOMINE HYDROCHLORIDE 20 MG: 20 TABLET ORAL at 06:13

## 2023-07-11 RX ADMIN — SUCRALFATE 1 G: 1 TABLET ORAL at 11:45

## 2023-07-11 RX ADMIN — MORPHINE SULFATE 4 MG: 2 INJECTION, SOLUTION INTRAMUSCULAR; INTRAVENOUS at 19:42

## 2023-07-11 RX ADMIN — DICYCLOMINE HYDROCHLORIDE 20 MG: 20 TABLET ORAL at 17:50

## 2023-07-11 RX ADMIN — MORPHINE SULFATE 4 MG: 2 INJECTION, SOLUTION INTRAMUSCULAR; INTRAVENOUS at 23:35

## 2023-07-11 RX ADMIN — ENOXAPARIN SODIUM 30 MG: 100 INJECTION SUBCUTANEOUS at 20:40

## 2023-07-11 RX ADMIN — MORPHINE SULFATE 4 MG: 2 INJECTION, SOLUTION INTRAMUSCULAR; INTRAVENOUS at 00:36

## 2023-07-11 RX ADMIN — ONDANSETRON 4 MG: 4 TABLET, ORALLY DISINTEGRATING ORAL at 08:21

## 2023-07-11 ASSESSMENT — PAIN DESCRIPTION - LOCATION
LOCATION: ABDOMEN

## 2023-07-11 ASSESSMENT — PAIN SCALES - GENERAL
PAINLEVEL_OUTOF10: 8
PAINLEVEL_OUTOF10: 8
PAINLEVEL_OUTOF10: 3
PAINLEVEL_OUTOF10: 8
PAINLEVEL_OUTOF10: 7

## 2023-07-11 ASSESSMENT — PAIN DESCRIPTION - DESCRIPTORS
DESCRIPTORS: ACHING;DISCOMFORT
DESCRIPTORS: ACHING;DISCOMFORT
DESCRIPTORS: ACHING;DISCOMFORT;SHARP
DESCRIPTORS: CRAMPING;ACHING

## 2023-07-11 ASSESSMENT — PAIN DESCRIPTION - ORIENTATION
ORIENTATION: RIGHT;UPPER
ORIENTATION: RIGHT

## 2023-07-11 ASSESSMENT — PAIN DESCRIPTION - PAIN TYPE
TYPE: ACUTE PAIN
TYPE: ACUTE PAIN

## 2023-07-11 ASSESSMENT — PAIN - FUNCTIONAL ASSESSMENT
PAIN_FUNCTIONAL_ASSESSMENT: ACTIVITIES ARE NOT PREVENTED
PAIN_FUNCTIONAL_ASSESSMENT: PREVENTS OR INTERFERES SOME ACTIVE ACTIVITIES AND ADLS
PAIN_FUNCTIONAL_ASSESSMENT: PREVENTS OR INTERFERES SOME ACTIVE ACTIVITIES AND ADLS
PAIN_FUNCTIONAL_ASSESSMENT: NONE - DENIES PAIN

## 2023-07-11 ASSESSMENT — PAIN DESCRIPTION - FREQUENCY
FREQUENCY: CONTINUOUS
FREQUENCY: CONTINUOUS

## 2023-07-11 ASSESSMENT — PAIN DESCRIPTION - ONSET
ONSET: ON-GOING
ONSET: ON-GOING

## 2023-07-11 NOTE — H&P
mg in 50 mL IVPB premix    pantoprazole (PROTONIX) 40 mg in sodium chloride (PF) 0.9 % 10 mL injection    insulin lispro (HUMALOG) injection vial 0-16 Units    insulin lispro (HUMALOG) injection vial 0-4 Units    insulin glargine (LANTUS) injection vial 24 Units    DULoxetine (CYMBALTA) extended release capsule 30 mg    atorvastatin (LIPITOR) tablet 80 mg       I have personally reviewed labs and tests:  Recent Labs:  Recent Results (from the past 24 hour(s))   CBC with Diff    Collection Time: 07/10/23  2:05 PM   Result Value Ref Range    WBC 6.7 4.3 - 11.1 K/uL    RBC 4.82 4.23 - 5.60 M/uL    Hemoglobin 12.2 (L) 13.6 - 17.2 g/dL    Hematocrit 37.7 (L) 41.1 - 50.3 %    MCV 78.2 (L) 82.0 - 102.0 FL    MCH 25.3 (L) 26.1 - 32.9 PG    MCHC 32.4 31.4 - 35.0 g/dL    RDW 14.0 11.9 - 14.6 %    Platelets 307 988 - 671 K/uL    MPV 9.6 9.4 - 12.3 FL    nRBC 0.00 0.0 - 0.2 K/uL    Differential Type AUTOMATED      Neutrophils % 74 43 - 78 %    Lymphocytes % 18 13 - 44 %    Monocytes % 6 4.0 - 12.0 %    Eosinophils % 3 0.5 - 7.8 %    Basophils % 1 0.0 - 2.0 %    Immature Granulocytes 0 0.0 - 5.0 %    Neutrophils Absolute 4.9 1.7 - 8.2 K/UL    Lymphocytes Absolute 1.2 0.5 - 4.6 K/UL    Monocytes Absolute 0.4 0.1 - 1.3 K/UL    Eosinophils Absolute 0.2 0.0 - 0.8 K/UL    Basophils Absolute 0.0 0.0 - 0.2 K/UL    Absolute Immature Granulocyte 0.0 0.0 - 0.5 K/UL   CMP    Collection Time: 07/10/23  2:05 PM   Result Value Ref Range    Sodium 139 133 - 143 mmol/L    Potassium 4.4 3.5 - 5.1 mmol/L    Chloride 104 98 - 107 mmol/L    CO2 26 21 - 32 mmol/L    Anion Gap 9 2 - 11 mmol/L    Glucose 143 (H) 65 - 100 mg/dL    BUN 13 6 - 23 MG/DL    Creatinine 0.93 0.8 - 1.5 MG/DL    Est, Glom Filt Rate >60 >60 ml/min/1.73m2    Calcium 8.9 8.3 - 10.4 MG/DL    Total Bilirubin 0.3 0.2 - 1.1 MG/DL    ALT 39 13.0 - 61.0 U/L    AST 28 15 - 37 U/L    Alk Phosphatase 130 (H) 45.0 - 117.0 U/L    Total Protein 6.8 6.4 - 8.2 g/dL    Albumin 3.9 3.5 - 5.0

## 2023-07-11 NOTE — ANESTHESIA PRE PROCEDURE
Department of Anesthesiology  Preprocedure Note       Name:  Sonia Stone   Age:  42 y.o.  :  1985                                          MRN:  [id number]         Date:  2023      Surgeon: Hope Ramirez):  Grecia Smith MD    Procedure: Procedure(s):  EGD BIOPSY    Medications prior to admission:   Prior to Admission medications    Medication Sig Start Date End Date Taking? Authorizing Provider   ondansetron (ZOFRAN-ODT) 4 MG disintegrating tablet Take 1 tablet by mouth 3 times daily as needed for Nausea or Vomiting 23   Ivet Chavez MD   famotidine (PEPCID) 40 MG tablet Take 1 tablet by mouth 2 times daily 23   Moon Owen MD   aspirin 325 MG tablet Take 1 tablet by mouth daily for 7 days Start after surgery 23  DAPHNE Bailey   senna-docusate (PERICOLACE) 8.6-50 MG per tablet Take 1 tablet by mouth daily Take for constipation prophylaxis 23   DAPHNE Bailey   ondansetron (ZOFRAN-ODT) 8 MG TBDP disintegrating tablet Place 0.5 tablets under the tongue in the morning and 0.5 tablets at noon and 0.5 tablets in the evening and 0.5 tablets before bedtime. Take 20 minutes prior to pain medication for nausea prevention.  23   DAPHNE Bailey   atorvastatin (LIPITOR) 80 MG tablet Take 1 tablet by mouth daily    Historical Provider, MD   insulin lispro, 1 Unit Dial, (HUMALOG KWIKPEN) 100 UNIT/ML SOPN Take 35 U SC 3 times daily before meals 23   Catawba Cheek, APRN - CNP   DULoxetine (CYMBALTA) 30 MG extended release capsule Take 1 capsule by mouth daily 23   Catawba Cheek, APRN - CNP   Testosterone Enanthate (XYOSTED) 100 MG/0.5ML SOAJ Inject 100 mg into the skin every 7 days Max Daily Amount: 100 mg 23   ZULLY Epstein - NP   nitroGLYCERIN (NITROSTAT) 0.4 MG SL tablet Place 1 tablet under the tongue every 5 minutes as needed 21   Historical Provider, MD   tadalafil (CIALIS) 20 MG tablet Take 1 tablet by mouth as needed 2/22/22
105 07/11/2023 09:29 AM    CO2 28 07/11/2023 09:29 AM    BUN 11 07/11/2023 09:29 AM    CREATININE 1.00 07/11/2023 09:29 AM    GFRAA >102 04/21/2022 02:08 PM    AGRATIO 1.1 04/21/2022 02:08 PM    LABGLOM >60 07/11/2023 09:29 AM    GLUCOSE 138 07/11/2023 09:29 AM    PROT 7.0 07/11/2023 09:29 AM    CALCIUM 8.6 07/11/2023 09:29 AM    BILITOT 0.5 07/11/2023 09:29 AM    ALKPHOS 133 07/11/2023 09:29 AM    ALKPHOS 221 04/21/2022 02:08 PM    AST 40 07/11/2023 09:29 AM    ALT 46 07/11/2023 09:29 AM       POC Tests:   Recent Labs     07/11/23  1032   POCGLU 128*       Coags:   Lab Results   Component Value Date/Time    PROTIME 12.8 10/09/2020 12:07 AM    INR 0.9 10/09/2020 12:07 AM       HCG (If Applicable): No results found for: PREGTESTUR, PREGSERUM, HCG, HCGQUANT     ABGs: No results found for: PHART, PO2ART, UAI8VWW, PDR4VOE, BEART, J1DNNZGL     Type & Screen (If Applicable):  No results found for: LABABO, LABRH    Drug/Infectious Status (If Applicable):  No results found for: HIV, HEPCAB    COVID-19 Screening (If Applicable):   Lab Results   Component Value Date/Time    COVID19 Not detected 02/20/2023 09:10 AM           Anesthesia Evaluation  Patient summary reviewed and Nursing notes reviewed no history of anesthetic complications:   Airway: Mallampati: III  TM distance: >3 FB   Neck ROM: full  Mouth opening: > = 3 FB   Dental: normal exam         Pulmonary: breath sounds clear to auscultation  (+) sleep apnea: on noncompliant,                             Cardiovascular:  Exercise tolerance: good (>4 METS),   (+) hypertension: mild, hyperlipidemia        Rhythm: regular  Rate: normal                 ROS comment: Cath 2019 - minimal dz     Neuro/Psych:   (+) TIA, depression/anxiety             GI/Hepatic/Renal:   (+) GERD: well controlled, morbid obesity         ROS comment: diabetic gastroparesis. Endo/Other:    (+) DiabetesType II DM, well controlled, using insulin, .                  Abdominal:             Vascular:

## 2023-07-11 NOTE — ANESTHESIA PROCEDURE NOTES
Airway  Date/Time: 7/11/2023 4:41 PM  Urgency: elective    Airway not difficult    General Information and Staff    Patient location during procedure: OR  Performed: resident/CRNA     Indications and Patient Condition  Indications for airway management: anesthesia  Spontaneous Ventilation: absent  Sedation level: deep  Preoxygenated: yes  Patient position: sniffing  MILS not maintained throughout  Mask difficulty assessment: vent by bag mask + OA or adjuvant +/- NMBA    Final Airway Details  Final airway type: endotracheal airway      Successful airway: ETT  Cuffed: yes   Successful intubation technique: video laryngoscopy  Facilitating devices/methods: intubating stylet  Endotracheal tube insertion site: oral  Blade: Norma  Blade size: #4  ETT size (mm): 8.0  Cormack-Lehane Classification: grade I - full view of glottis  Placement verified by: chest auscultation and capnometry   Measured from: lips  ETT to lips (cm): 24  Number of attempts at approach: 1  Ventilation between attempts: bag mask  Number of other approaches attempted: 0    Additional Comments  rsi  no

## 2023-07-11 NOTE — OP NOTE
Operative Note    Esophagogastroduodenoscopy    DATE of PROCEDURE: 7/11/2023    INDICATION: Right upper quadrant abdominal pain and vomiting we will her to PACU  Call somebody appointment before the number third    POSTPROCEDURE DIAGNOSIS: Normal examination    MEDICATIONS ADMINISTERED: MAC. Further details per anesthesia note. INSTRUMENT: [id number]    PROCEDURE:  After obtaining informed consent, the patient was placed in the left lateral position and sedated. The endoscope was advanced under direct vision without difficulty to the level of the third portion of the duodenum. The esophagus, stomach (including retroflexed views) and duodenum were evaluated. The patient was taken to the recovery area in stable condition. FINDINGS:  ESOPHAGUS: Larynx and vocal cords normal.  Esophagus normal no esophagitis present. No Latisha-Corrigan tear seen. Squamocolumnar junction at 43 cm from the gums. STOMACH: No retained gastric food contents. DUODENUM: Duodenal bulb normal.  Remainder of duodenum to third portion normal    ESTIMATED BLOOD LOSS 0-minimal     COMPLICATIONS: none    Specimens obtained during procedure: None    IMPRESSION:  Normal examination      PLAN:  1. Findings to be discussed with patient's family. 2.  Hepatobiliary scan tonight. If abnormal, anticipate laparoscopic cholecystectomy. 3.  We will sign off. Please call with any questions. Laisha Mayo MD, [address] [place] [address]  Gastroenterology     Part of this note may have been written by using a voice dictation software. The note has been proof read but may still contain some grammatical/other or typographical errors.

## 2023-07-11 NOTE — CARE COORDINATION
Pt chart reviewed for discharge planning. CM met with pt and spouse at bedside, spouse verified demographic information/ health insurance. Pt lives with spouse, independent with ADLs, ambulates with no DME, and drives. PCP was confirmed, last seen a month ago in office. Pt receives no outside services in the home at this time. CM will follow pt plan of care and assist with supportive care referrals pending pt clinical progress. Please consult case management if specific needs arise. 07/11/23 1157   Service Assessment   Patient Orientation Unable to Assess   Cognition Other (see comment)  (Pt sleeping)   History Provided By Spouse  Jabari Campos)   Primary [address] is: Named in [address]   PCP Verified by CM Yes  Jose M Dural)   Last Visit to PCP Within last 3 months   Prior Functional Level Independent in ADLs/IADLs   Current Functional Level Independent in ADLs/IADLs   Can patient return to prior living arrangement Yes   Ability to make needs known: Good   Family able to assist with home care needs: Yes   Would you like for me to discuss the discharge plan with any other family members/significant others, and if so, who? Yes  (Spouse)   Financial Resources Other (Comment)  (BCBS)   Community Resources None   Social/Functional History   Lives With Spouse   Type of [address] [place] [address] Dr Howie keenan   Receives Help From [address] Yes   Occupation Full time employment   Discharge Planning   Type of [facility] Prior To Admission None   Potential Assistance Needed N/A   DME Ordered?  No   Potential Assistance Purchasing Medications No   Type of Home Care Services None   Patient expects to be discharged to: House   One/Two Story Residence One story   Condition of Participation: Discharge Planning   The

## 2023-07-11 NOTE — FLOWSHEET NOTE
07/10/23 2000   Family/Physician Notification   Do you want a physician to be notified of this admission (Other than treatment team) Yes   Physician Name & Phone Number Arcelia Sj, [phone]   Physician Notification Completed No

## 2023-07-11 NOTE — ANESTHESIA POSTPROCEDURE EVALUATION
Department of Anesthesiology  Postprocedure Note    Patient: Sarah Obrien  MRN: [id number]  YOB: 1985  Date of evaluation: 7/11/2023      Procedure Summary     Date: 07/11/23 Room / Location: [facility] ENDO FLOURO 1 / [facility] ENDOSCOPY    Anesthesia Start: 1628 Anesthesia Stop: 0859    Procedure: EGD BIOPSY (Upper GI Region) Diagnosis:       Right upper quadrant pain      (Right upper quadrant pain [R10.11])    Surgeons: Moe Saba MD Responsible Provider: Enmanuel Lee MD    Anesthesia Type: general ASA Status: 3 - Emergent          Anesthesia Type: No value filed.     Tish Phase I: Tish Score: 9    Tish Phase II:        Anesthesia Post Evaluation    Patient location during evaluation: PACU  Patient participation: complete - patient participated  Level of consciousness: awake  Pain score: 0  Airway patency: patent  Nausea & Vomiting: no nausea and no vomiting  Complications: no  Cardiovascular status: blood pressure returned to baseline and hemodynamically stable  Respiratory status: acceptable, spontaneous ventilation and nonlabored ventilation  Hydration status: euvolemic  Multimodal analgesia pain management approach

## 2023-07-12 ENCOUNTER — HOSPITAL ENCOUNTER (OUTPATIENT)
Dept: NUCLEAR MEDICINE | Age: 38
Setting detail: OBSERVATION
Discharge: HOME | End: 2023-07-15
Attending: FAMILY MEDICINE
Payer: COMMERCIAL

## 2023-07-12 ENCOUNTER — PREP FOR PROCEDURE (OUTPATIENT)
Dept: SURGERY | Age: 38
End: 2023-07-12

## 2023-07-12 ENCOUNTER — ANESTHESIA EVENT (OUTPATIENT)
Dept: SURGERY | Age: 38
End: 2023-07-12
Payer: COMMERCIAL

## 2023-07-12 PROBLEM — K81.1 CHRONIC CHOLECYSTITIS: Status: ACTIVE | Noted: 2023-07-10

## 2023-07-12 LAB
GLUCOSE BLD STRIP.AUTO-MCNC: 110 MG/DL (ref 65–100)
GLUCOSE BLD STRIP.AUTO-MCNC: 115 MG/DL (ref 65–100)
GLUCOSE BLD STRIP.AUTO-MCNC: 118 MG/DL (ref 65–100)
GLUCOSE BLD STRIP.AUTO-MCNC: 119 MG/DL (ref 65–100)
SERVICE CMNT-IMP: ABNORMAL

## 2023-07-12 PROCEDURE — 78227 HEPATOBIL SYST IMAGE W/DRUG: CPT

## 2023-07-12 PROCEDURE — 3430000000 HC RX DIAGNOSTIC RADIOPHARMACEUTICAL: Performed by: FAMILY MEDICINE

## 2023-07-12 PROCEDURE — A9537 TC99M MEBROFENIN: HCPCS | Performed by: FAMILY MEDICINE

## 2023-07-12 PROCEDURE — 6360000004 HC RX CONTRAST MEDICATION: Performed by: FAMILY MEDICINE

## 2023-07-12 PROCEDURE — 2580000003 HC RX 258: Performed by: FAMILY MEDICINE

## 2023-07-12 PROCEDURE — 96376 TX/PRO/DX INJ SAME DRUG ADON: CPT

## 2023-07-12 PROCEDURE — 6360000002 HC RX W HCPCS: Performed by: INTERNAL MEDICINE

## 2023-07-12 PROCEDURE — 96361 HYDRATE IV INFUSION ADD-ON: CPT

## 2023-07-12 PROCEDURE — 96372 THER/PROPH/DIAG INJ SC/IM: CPT

## 2023-07-12 PROCEDURE — C9113 INJ PANTOPRAZOLE SODIUM, VIA: HCPCS | Performed by: FAMILY MEDICINE

## 2023-07-12 PROCEDURE — 6370000000 HC RX 637 (ALT 250 FOR IP): Performed by: FAMILY MEDICINE

## 2023-07-12 PROCEDURE — G0378 HOSPITAL OBSERVATION PER HR: HCPCS

## 2023-07-12 PROCEDURE — 99223 1ST HOSP IP/OBS HIGH 75: CPT | Performed by: SURGERY

## 2023-07-12 PROCEDURE — 82962 GLUCOSE BLOOD TEST: CPT

## 2023-07-12 PROCEDURE — 6360000002 HC RX W HCPCS: Performed by: FAMILY MEDICINE

## 2023-07-12 PROCEDURE — 6370000000 HC RX 637 (ALT 250 FOR IP): Performed by: STUDENT IN AN ORGANIZED HEALTH CARE EDUCATION/TRAINING PROGRAM

## 2023-07-12 PROCEDURE — 2580000003 HC RX 258: Performed by: ANESTHESIOLOGY

## 2023-07-12 PROCEDURE — A4216 STERILE WATER/SALINE, 10 ML: HCPCS | Performed by: FAMILY MEDICINE

## 2023-07-12 RX ORDER — ZOLPIDEM TARTRATE 5 MG/1
5 TABLET ORAL ONCE
Status: COMPLETED | OUTPATIENT
Start: 2023-07-12 | End: 2023-07-12

## 2023-07-12 RX ORDER — INSULIN GLARGINE 100 [IU]/ML
12 INJECTION, SOLUTION SUBCUTANEOUS DAILY
Status: DISCONTINUED | OUTPATIENT
Start: 2023-07-13 | End: 2023-07-15

## 2023-07-12 RX ORDER — KIT FOR THE PREPARATION OF TECHNETIUM TC 99M MEBROFENIN 45 MG/10ML
6 INJECTION, POWDER, LYOPHILIZED, FOR SOLUTION INTRAVENOUS
Status: COMPLETED | OUTPATIENT
Start: 2023-07-12 | End: 2023-07-12

## 2023-07-12 RX ADMIN — INSULIN GLARGINE 24 UNITS: 100 INJECTION, SOLUTION SUBCUTANEOUS at 07:44

## 2023-07-12 RX ADMIN — DULOXETINE HYDROCHLORIDE 30 MG: 30 CAPSULE, DELAYED RELEASE ORAL at 07:44

## 2023-07-12 RX ADMIN — MORPHINE SULFATE 4 MG: 2 INJECTION, SOLUTION INTRAMUSCULAR; INTRAVENOUS at 17:32

## 2023-07-12 RX ADMIN — SUCRALFATE 1 G: 1 TABLET ORAL at 05:41

## 2023-07-12 RX ADMIN — SODIUM CHLORIDE, POTASSIUM CHLORIDE, SODIUM LACTATE AND CALCIUM CHLORIDE: 600; 310; 30; 20 INJECTION, SOLUTION INTRAVENOUS at 20:20

## 2023-07-12 RX ADMIN — SINCALIDE 2.88 MCG: 5 INJECTION, POWDER, LYOPHILIZED, FOR SOLUTION INTRAVENOUS at 10:15

## 2023-07-12 RX ADMIN — DICYCLOMINE HYDROCHLORIDE 20 MG: 20 TABLET ORAL at 11:12

## 2023-07-12 RX ADMIN — MORPHINE SULFATE 4 MG: 2 INJECTION, SOLUTION INTRAMUSCULAR; INTRAVENOUS at 21:30

## 2023-07-12 RX ADMIN — MORPHINE SULFATE 4 MG: 2 INJECTION, SOLUTION INTRAMUSCULAR; INTRAVENOUS at 11:11

## 2023-07-12 RX ADMIN — DICYCLOMINE HYDROCHLORIDE 20 MG: 20 TABLET ORAL at 17:31

## 2023-07-12 RX ADMIN — SODIUM CHLORIDE, PRESERVATIVE FREE 40 MG: 5 INJECTION INTRAVENOUS at 20:28

## 2023-07-12 RX ADMIN — ONDANSETRON 4 MG: 2 INJECTION INTRAMUSCULAR; INTRAVENOUS at 21:30

## 2023-07-12 RX ADMIN — HYDRALAZINE HYDROCHLORIDE 25 MG: 10 TABLET, FILM COATED ORAL at 07:44

## 2023-07-12 RX ADMIN — ENOXAPARIN SODIUM 30 MG: 100 INJECTION SUBCUTANEOUS at 07:43

## 2023-07-12 RX ADMIN — SODIUM CHLORIDE, PRESERVATIVE FREE 10 ML: 5 INJECTION INTRAVENOUS at 07:51

## 2023-07-12 RX ADMIN — ATORVASTATIN CALCIUM 80 MG: 40 TABLET, FILM COATED ORAL at 07:44

## 2023-07-12 RX ADMIN — SODIUM CHLORIDE, PRESERVATIVE FREE 10 ML: 5 INJECTION INTRAVENOUS at 20:28

## 2023-07-12 RX ADMIN — SUCRALFATE 1 G: 1 TABLET ORAL at 17:31

## 2023-07-12 RX ADMIN — MEBROFENIN 6 MILLICURIE: 45 INJECTION, POWDER, LYOPHILIZED, FOR SOLUTION INTRAVENOUS at 09:00

## 2023-07-12 RX ADMIN — SODIUM CHLORIDE, POTASSIUM CHLORIDE, SODIUM LACTATE AND CALCIUM CHLORIDE: 600; 310; 30; 20 INJECTION, SOLUTION INTRAVENOUS at 11:24

## 2023-07-12 RX ADMIN — SUCRALFATE 1 G: 1 TABLET ORAL at 22:33

## 2023-07-12 RX ADMIN — ENOXAPARIN SODIUM 30 MG: 100 INJECTION SUBCUTANEOUS at 20:30

## 2023-07-12 RX ADMIN — SUCRALFATE 1 G: 1 TABLET ORAL at 11:12

## 2023-07-12 RX ADMIN — SODIUM CHLORIDE, PRESERVATIVE FREE 40 MG: 5 INJECTION INTRAVENOUS at 07:57

## 2023-07-12 RX ADMIN — ZOLPIDEM TARTRATE 5 MG: 5 TABLET ORAL at 22:33

## 2023-07-12 RX ADMIN — ONDANSETRON 4 MG: 2 INJECTION INTRAMUSCULAR; INTRAVENOUS at 11:11

## 2023-07-12 RX ADMIN — DICYCLOMINE HYDROCHLORIDE 20 MG: 20 TABLET ORAL at 20:29

## 2023-07-12 ASSESSMENT — LIFESTYLE VARIABLES: SMOKING_STATUS: 1

## 2023-07-12 ASSESSMENT — PAIN SCALES - GENERAL
PAINLEVEL_OUTOF10: 1
PAINLEVEL_OUTOF10: 8
PAINLEVEL_OUTOF10: 7
PAINLEVEL_OUTOF10: 7
PAINLEVEL_OUTOF10: 1
PAINLEVEL_OUTOF10: 6

## 2023-07-12 ASSESSMENT — PAIN DESCRIPTION - LOCATION
LOCATION: ABDOMEN
LOCATION: ABDOMEN

## 2023-07-12 ASSESSMENT — PAIN DESCRIPTION - ONSET: ONSET: ON-GOING

## 2023-07-12 ASSESSMENT — PAIN DESCRIPTION - FREQUENCY: FREQUENCY: CONTINUOUS

## 2023-07-12 ASSESSMENT — PAIN DESCRIPTION - DESCRIPTORS: DESCRIPTORS: ACHING;DISCOMFORT

## 2023-07-12 ASSESSMENT — PAIN - FUNCTIONAL ASSESSMENT: PAIN_FUNCTIONAL_ASSESSMENT: PREVENTS OR INTERFERES SOME ACTIVE ACTIVITIES AND ADLS

## 2023-07-12 ASSESSMENT — PAIN DESCRIPTION - ORIENTATION: ORIENTATION: RIGHT;UPPER

## 2023-07-12 ASSESSMENT — PAIN DESCRIPTION - PAIN TYPE: TYPE: ACUTE PAIN

## 2023-07-12 NOTE — CARE COORDINATION
Patient chart reviewed for continued stay. Patient underwent HIDA scan today. Patient tells me they will be removing his gallbladder before discharge. Patient should still discharge home with no needs. Will continue to follow patient's plan of care and assist further with supportive care needs as appropriate.

## 2023-07-12 NOTE — CONSULTS
H&P/Consult Note/Progress Note/Office Note:   Too Dunlap  MRN: [id number]  :1985  Age:37 y.o.    HPI: Too Dunlap is a 42 y.o. male who is seen in consultation for RUQ pain. The patient presented to the emergency department 2 days ago with RUQ pain, vomiting and diarrhea. He states that it has been ongoing for 25 days. He was seen in the ER on 2023 and had a CT of the abdomen/pelvis that was negative for any acute findings. The patient continued to have worsening abdominal pain with associated nausea and emesis. He again presented to the emergency department where he had an ultrasound that revealed no evidence of cholelithiasis or cholecystitis. The patient then underwent an EGD yesterday that was normal. He then had a HIDA scan today that revealed no evidence of acute cholecystitis or biliary obstruction, he did have an ejection fraction of 11% and reported reproducing his symptoms with CCK. At this time the patient states since being NPO his nausea and diarrhea have improved. He states that he has been having worsening symptoms associated with food over the last few weeks. He states he still has some RUQ pain. The patient has a significant history of diabetes, on insulin. He is morbidly obese with a BMI of 43. He has a past surgical history significant for an appendectomy.              Past Medical History:   Diagnosis Date    Adhesive capsulitis of left shoulder 2023    Amputated toe ([address])     right great toe    Arrhythmia     \" artery in heart goes into spams\"  no meds    Coronary artery disease involving native coronary artery with angina pectoris with documented spasm (HCC)     Deep venous thrombosis ([address]) 2023    Depression with anxiety 2017    Essential hypertension 2018    Gastroesophageal reflux disease without esophagitis 2023    History of kidney stones     multiple with surgical intervention X1    History of TIA (transient ischemic attack)
without esophagitis    Microcytic anemia  Resolved Problems:    * No resolved hospital problems. *      Plan:     41-year-old male with a history of type 2 diabetes mellitus presenting now with right upper quadrant abdominal pain and vomiting with certain symptoms consistent with diabetic gastroparesis. Other considerations would include biliary dyskinesia/a acalculous cholecystitis in the context of diabetes and recent weight loss. Above reviewed in detail with patient. Will arrange for upper endoscopy this morning with intubation to protect airway for further evaluation of symptoms. Depending on this examination, would recommend dynamic HIDA as the next step. Destiny Willard MD, [address] [place] [address]  Gastroenterology    7/11/2023  7:35 AM    Part of this note may have been written by using a voice dictation software. The note has been proof read but may still contain some grammatical/other or typographical errors.

## 2023-07-12 NOTE — PERIOP NOTE
6th floor staff informed of patient's procedure on 7/13 with Dr. Latrell Borden. Pre-op arrival of [phone] with procedure time of [phone].

## 2023-07-13 ENCOUNTER — ANESTHESIA (OUTPATIENT)
Dept: SURGERY | Age: 38
End: 2023-07-13
Payer: COMMERCIAL

## 2023-07-13 LAB
ALBUMIN SERPL-MCNC: 2.9 G/DL (ref 3.5–5)
ALBUMIN/GLOB SERPL: 0.7 (ref 0.4–1.6)
ALP SERPL-CCNC: 124 U/L (ref 50–136)
ALT SERPL-CCNC: 39 U/L (ref 12–65)
ANION GAP SERPL CALC-SCNC: 6 MMOL/L (ref 2–11)
AST SERPL-CCNC: 19 U/L (ref 15–37)
BASOPHILS # BLD: 0 K/UL (ref 0–0.2)
BASOPHILS NFR BLD: 0 % (ref 0–2)
BILIRUB SERPL-MCNC: 0.5 MG/DL (ref 0.2–1.1)
BUN SERPL-MCNC: 9 MG/DL (ref 6–23)
CALCIUM SERPL-MCNC: 8.8 MG/DL (ref 8.3–10.4)
CHLORIDE SERPL-SCNC: 105 MMOL/L (ref 101–110)
CO2 SERPL-SCNC: 28 MMOL/L (ref 21–32)
CREAT SERPL-MCNC: 1.1 MG/DL (ref 0.8–1.5)
DIFFERENTIAL METHOD BLD: ABNORMAL
EOSINOPHIL # BLD: 0.2 K/UL (ref 0–0.8)
EOSINOPHIL NFR BLD: 3 % (ref 0.5–7.8)
ERYTHROCYTE [DISTWIDTH] IN BLOOD BY AUTOMATED COUNT: 14 % (ref 11.9–14.6)
GLOBULIN SER CALC-MCNC: 4 G/DL (ref 2.8–4.5)
GLUCOSE BLD STRIP.AUTO-MCNC: 104 MG/DL (ref 65–100)
GLUCOSE BLD STRIP.AUTO-MCNC: 133 MG/DL (ref 65–100)
GLUCOSE BLD STRIP.AUTO-MCNC: 144 MG/DL (ref 65–100)
GLUCOSE BLD STRIP.AUTO-MCNC: 154 MG/DL (ref 65–100)
GLUCOSE BLD STRIP.AUTO-MCNC: 94 MG/DL (ref 65–100)
GLUCOSE SERPL-MCNC: 105 MG/DL (ref 65–100)
HCT VFR BLD AUTO: 38.1 % (ref 41.1–50.3)
HGB BLD-MCNC: 12.2 G/DL (ref 13.6–17.2)
IMM GRANULOCYTES # BLD AUTO: 0 K/UL (ref 0–0.5)
IMM GRANULOCYTES NFR BLD AUTO: 0 % (ref 0–5)
LYMPHOCYTES # BLD: 1.4 K/UL (ref 0.5–4.6)
LYMPHOCYTES NFR BLD: 20 % (ref 13–44)
MAGNESIUM SERPL-MCNC: 1.7 MG/DL (ref 1.8–2.4)
MCH RBC QN AUTO: 24.9 PG (ref 26.1–32.9)
MCHC RBC AUTO-ENTMCNC: 32 G/DL (ref 31.4–35)
MCV RBC AUTO: 77.8 FL (ref 82–102)
MONOCYTES # BLD: 0.5 K/UL (ref 0.1–1.3)
MONOCYTES NFR BLD: 8 % (ref 4–12)
NEUTS SEG # BLD: 4.6 K/UL (ref 1.7–8.2)
NEUTS SEG NFR BLD: 69 % (ref 43–78)
NRBC # BLD: 0 K/UL (ref 0–0.2)
PLATELET # BLD AUTO: 258 K/UL (ref 150–450)
PMV BLD AUTO: 9.4 FL (ref 9.4–12.3)
POTASSIUM SERPL-SCNC: 3.7 MMOL/L (ref 3.5–5.1)
PROT SERPL-MCNC: 6.9 G/DL (ref 6.3–8.2)
RBC # BLD AUTO: 4.9 M/UL (ref 4.23–5.6)
SERVICE CMNT-IMP: ABNORMAL
SERVICE CMNT-IMP: NORMAL
SODIUM SERPL-SCNC: 139 MMOL/L (ref 133–143)
WBC # BLD AUTO: 6.7 K/UL (ref 4.3–11.1)

## 2023-07-13 PROCEDURE — 2580000003 HC RX 258: Performed by: ANESTHESIOLOGY

## 2023-07-13 PROCEDURE — 6360000002 HC RX W HCPCS

## 2023-07-13 PROCEDURE — 6360000002 HC RX W HCPCS: Performed by: HOSPITALIST

## 2023-07-13 PROCEDURE — 6360000002 HC RX W HCPCS: Performed by: FAMILY MEDICINE

## 2023-07-13 PROCEDURE — 0FT44ZZ RESECTION OF GALLBLADDER, PERCUTANEOUS ENDOSCOPIC APPROACH: ICD-10-PCS | Performed by: SURGERY

## 2023-07-13 PROCEDURE — C9113 INJ PANTOPRAZOLE SODIUM, VIA: HCPCS | Performed by: FAMILY MEDICINE

## 2023-07-13 PROCEDURE — 2500000003 HC RX 250 WO HCPCS

## 2023-07-13 PROCEDURE — 85025 COMPLETE CBC W/AUTO DIFF WBC: CPT

## 2023-07-13 PROCEDURE — 3700000000 HC ANESTHESIA ATTENDED CARE: Performed by: SURGERY

## 2023-07-13 PROCEDURE — 96361 HYDRATE IV INFUSION ADD-ON: CPT

## 2023-07-13 PROCEDURE — 47562 LAPAROSCOPIC CHOLECYSTECTOMY: CPT | Performed by: SURGERY

## 2023-07-13 PROCEDURE — 6360000002 HC RX W HCPCS: Performed by: SURGERY

## 2023-07-13 PROCEDURE — 6360000002 HC RX W HCPCS: Performed by: ANESTHESIOLOGY

## 2023-07-13 PROCEDURE — 96366 THER/PROPH/DIAG IV INF ADDON: CPT

## 2023-07-13 PROCEDURE — 2580000003 HC RX 258: Performed by: FAMILY MEDICINE

## 2023-07-13 PROCEDURE — 6370000000 HC RX 637 (ALT 250 FOR IP): Performed by: INTERNAL MEDICINE

## 2023-07-13 PROCEDURE — 6370000000 HC RX 637 (ALT 250 FOR IP): Performed by: ANESTHESIOLOGY

## 2023-07-13 PROCEDURE — 2580000003 HC RX 258: Performed by: INTERNAL MEDICINE

## 2023-07-13 PROCEDURE — 96375 TX/PRO/DX INJ NEW DRUG ADDON: CPT

## 2023-07-13 PROCEDURE — 2720000010 HC SURG SUPPLY STERILE: Performed by: SURGERY

## 2023-07-13 PROCEDURE — 36415 COLL VENOUS BLD VENIPUNCTURE: CPT

## 2023-07-13 PROCEDURE — 6370000000 HC RX 637 (ALT 250 FOR IP): Performed by: FAMILY MEDICINE

## 2023-07-13 PROCEDURE — 80053 COMPREHEN METABOLIC PANEL: CPT

## 2023-07-13 PROCEDURE — 3600000014 HC SURGERY LEVEL 4 ADDTL 15MIN: Performed by: SURGERY

## 2023-07-13 PROCEDURE — 96365 THER/PROPH/DIAG IV INF INIT: CPT

## 2023-07-13 PROCEDURE — 88304 TISSUE EXAM BY PATHOLOGIST: CPT

## 2023-07-13 PROCEDURE — C9113 INJ PANTOPRAZOLE SODIUM, VIA: HCPCS | Performed by: INTERNAL MEDICINE

## 2023-07-13 PROCEDURE — A4216 STERILE WATER/SALINE, 10 ML: HCPCS | Performed by: INTERNAL MEDICINE

## 2023-07-13 PROCEDURE — 0DNU4ZZ RELEASE OMENTUM, PERCUTANEOUS ENDOSCOPIC APPROACH: ICD-10-PCS | Performed by: SURGERY

## 2023-07-13 PROCEDURE — 1100000000 HC RM PRIVATE

## 2023-07-13 PROCEDURE — 2709999900 HC NON-CHARGEABLE SUPPLY: Performed by: SURGERY

## 2023-07-13 PROCEDURE — A4216 STERILE WATER/SALINE, 10 ML: HCPCS | Performed by: FAMILY MEDICINE

## 2023-07-13 PROCEDURE — 7100000000 HC PACU RECOVERY - FIRST 15 MIN: Performed by: SURGERY

## 2023-07-13 PROCEDURE — 3700000001 HC ADD 15 MINUTES (ANESTHESIA): Performed by: SURGERY

## 2023-07-13 PROCEDURE — 6360000002 HC RX W HCPCS: Performed by: INTERNAL MEDICINE

## 2023-07-13 PROCEDURE — 82962 GLUCOSE BLOOD TEST: CPT

## 2023-07-13 PROCEDURE — 83735 ASSAY OF MAGNESIUM: CPT

## 2023-07-13 PROCEDURE — 7100000001 HC PACU RECOVERY - ADDTL 15 MIN: Performed by: SURGERY

## 2023-07-13 PROCEDURE — 3600000004 HC SURGERY LEVEL 4 BASE: Performed by: SURGERY

## 2023-07-13 PROCEDURE — 96376 TX/PRO/DX INJ SAME DRUG ADON: CPT

## 2023-07-13 RX ORDER — SODIUM CHLORIDE, SODIUM LACTATE, POTASSIUM CHLORIDE, CALCIUM CHLORIDE 600; 310; 30; 20 MG/100ML; MG/100ML; MG/100ML; MG/100ML
INJECTION, SOLUTION INTRAVENOUS CONTINUOUS
Status: DISCONTINUED | OUTPATIENT
Start: 2023-07-13 | End: 2023-07-13 | Stop reason: HOSPADM

## 2023-07-13 RX ORDER — MIDAZOLAM HYDROCHLORIDE 2 MG/2ML
2 INJECTION, SOLUTION INTRAMUSCULAR; INTRAVENOUS
Status: COMPLETED | OUTPATIENT
Start: 2023-07-13 | End: 2023-07-13

## 2023-07-13 RX ORDER — MAGNESIUM SULFATE IN WATER 40 MG/ML
2000 INJECTION, SOLUTION INTRAVENOUS ONCE
Status: COMPLETED | OUTPATIENT
Start: 2023-07-13 | End: 2023-07-13

## 2023-07-13 RX ORDER — SODIUM CHLORIDE 0.9 % (FLUSH) 0.9 %
5-40 SYRINGE (ML) INJECTION PRN
Status: DISCONTINUED | OUTPATIENT
Start: 2023-07-13 | End: 2023-07-13 | Stop reason: HOSPADM

## 2023-07-13 RX ORDER — DEXAMETHASONE SODIUM PHOSPHATE 4 MG/ML
INJECTION, SOLUTION INTRA-ARTICULAR; INTRALESIONAL; INTRAMUSCULAR; INTRAVENOUS; SOFT TISSUE PRN
Status: DISCONTINUED | OUTPATIENT
Start: 2023-07-13 | End: 2023-07-13 | Stop reason: SDUPTHER

## 2023-07-13 RX ORDER — SODIUM CHLORIDE 9 MG/ML
INJECTION, SOLUTION INTRAVENOUS CONTINUOUS
Status: DISCONTINUED | OUTPATIENT
Start: 2023-07-13 | End: 2023-07-13 | Stop reason: HOSPADM

## 2023-07-13 RX ORDER — NEOSTIGMINE METHYLSULFATE 1 MG/ML
INJECTION, SOLUTION INTRAVENOUS PRN
Status: DISCONTINUED | OUTPATIENT
Start: 2023-07-13 | End: 2023-07-13 | Stop reason: SDUPTHER

## 2023-07-13 RX ORDER — FENTANYL CITRATE 50 UG/ML
INJECTION, SOLUTION INTRAMUSCULAR; INTRAVENOUS PRN
Status: DISCONTINUED | OUTPATIENT
Start: 2023-07-13 | End: 2023-07-13 | Stop reason: SDUPTHER

## 2023-07-13 RX ORDER — SODIUM CHLORIDE 0.9 % (FLUSH) 0.9 %
5-40 SYRINGE (ML) INJECTION EVERY 12 HOURS SCHEDULED
Status: DISCONTINUED | OUTPATIENT
Start: 2023-07-13 | End: 2023-07-13 | Stop reason: HOSPADM

## 2023-07-13 RX ORDER — SODIUM CHLORIDE 9 MG/ML
INJECTION, SOLUTION INTRAVENOUS PRN
Status: DISCONTINUED | OUTPATIENT
Start: 2023-07-13 | End: 2023-07-13 | Stop reason: HOSPADM

## 2023-07-13 RX ORDER — EPHEDRINE SULFATE/0.9% NACL/PF 50 MG/5 ML
SYRINGE (ML) INTRAVENOUS PRN
Status: DISCONTINUED | OUTPATIENT
Start: 2023-07-13 | End: 2023-07-13 | Stop reason: SDUPTHER

## 2023-07-13 RX ORDER — LIDOCAINE HYDROCHLORIDE 10 MG/ML
1 INJECTION, SOLUTION INFILTRATION; PERINEURAL
Status: DISCONTINUED | OUTPATIENT
Start: 2023-07-13 | End: 2023-07-13 | Stop reason: HOSPADM

## 2023-07-13 RX ORDER — HYDROMORPHONE HYDROCHLORIDE 2 MG/ML
0.25 INJECTION, SOLUTION INTRAMUSCULAR; INTRAVENOUS; SUBCUTANEOUS EVERY 5 MIN PRN
Status: DISCONTINUED | OUTPATIENT
Start: 2023-07-13 | End: 2023-07-13 | Stop reason: HOSPADM

## 2023-07-13 RX ORDER — HYDROMORPHONE HYDROCHLORIDE 2 MG/ML
0.5 INJECTION, SOLUTION INTRAMUSCULAR; INTRAVENOUS; SUBCUTANEOUS EVERY 10 MIN PRN
Status: DISCONTINUED | OUTPATIENT
Start: 2023-07-13 | End: 2023-07-13 | Stop reason: HOSPADM

## 2023-07-13 RX ORDER — SUCCINYLCHOLINE CHLORIDE 20 MG/ML
INJECTION INTRAMUSCULAR; INTRAVENOUS PRN
Status: DISCONTINUED | OUTPATIENT
Start: 2023-07-13 | End: 2023-07-13 | Stop reason: SDUPTHER

## 2023-07-13 RX ORDER — OXYCODONE HYDROCHLORIDE 5 MG/1
5 TABLET ORAL PRN
Status: DISCONTINUED | OUTPATIENT
Start: 2023-07-13 | End: 2023-07-13 | Stop reason: HOSPADM

## 2023-07-13 RX ORDER — SODIUM CHLORIDE, SODIUM LACTATE, POTASSIUM CHLORIDE, CALCIUM CHLORIDE 600; 310; 30; 20 MG/100ML; MG/100ML; MG/100ML; MG/100ML
INJECTION, SOLUTION INTRAVENOUS CONTINUOUS
Status: DISCONTINUED | OUTPATIENT
Start: 2023-07-13 | End: 2023-07-14

## 2023-07-13 RX ORDER — OXYCODONE HYDROCHLORIDE 5 MG/1
10 TABLET ORAL PRN
Status: DISCONTINUED | OUTPATIENT
Start: 2023-07-13 | End: 2023-07-13 | Stop reason: HOSPADM

## 2023-07-13 RX ORDER — FAMOTIDINE 20 MG/1
20 TABLET, FILM COATED ORAL ONCE
Status: COMPLETED | OUTPATIENT
Start: 2023-07-13 | End: 2023-07-13

## 2023-07-13 RX ORDER — DIPHENHYDRAMINE HYDROCHLORIDE 50 MG/ML
12.5 INJECTION INTRAMUSCULAR; INTRAVENOUS
Status: DISCONTINUED | OUTPATIENT
Start: 2023-07-13 | End: 2023-07-13 | Stop reason: HOSPADM

## 2023-07-13 RX ORDER — FENTANYL CITRATE 50 UG/ML
100 INJECTION, SOLUTION INTRAMUSCULAR; INTRAVENOUS
Status: DISCONTINUED | OUTPATIENT
Start: 2023-07-13 | End: 2023-07-13 | Stop reason: HOSPADM

## 2023-07-13 RX ORDER — GLYCOPYRROLATE 0.2 MG/ML
INJECTION INTRAMUSCULAR; INTRAVENOUS PRN
Status: DISCONTINUED | OUTPATIENT
Start: 2023-07-13 | End: 2023-07-13 | Stop reason: SDUPTHER

## 2023-07-13 RX ORDER — METRONIDAZOLE 500 MG/100ML
500 INJECTION, SOLUTION INTRAVENOUS ONCE
Status: COMPLETED | OUTPATIENT
Start: 2023-07-13 | End: 2023-07-13

## 2023-07-13 RX ORDER — ONDANSETRON 2 MG/ML
4 INJECTION INTRAMUSCULAR; INTRAVENOUS
Status: COMPLETED | OUTPATIENT
Start: 2023-07-13 | End: 2023-07-13

## 2023-07-13 RX ORDER — LIDOCAINE HYDROCHLORIDE 20 MG/ML
INJECTION, SOLUTION EPIDURAL; INFILTRATION; INTRACAUDAL; PERINEURAL PRN
Status: DISCONTINUED | OUTPATIENT
Start: 2023-07-13 | End: 2023-07-13 | Stop reason: SDUPTHER

## 2023-07-13 RX ORDER — ROCURONIUM BROMIDE 10 MG/ML
INJECTION, SOLUTION INTRAVENOUS PRN
Status: DISCONTINUED | OUTPATIENT
Start: 2023-07-13 | End: 2023-07-13 | Stop reason: SDUPTHER

## 2023-07-13 RX ORDER — PROPOFOL 10 MG/ML
INJECTION, EMULSION INTRAVENOUS PRN
Status: DISCONTINUED | OUTPATIENT
Start: 2023-07-13 | End: 2023-07-13 | Stop reason: SDUPTHER

## 2023-07-13 RX ORDER — ONDANSETRON 2 MG/ML
INJECTION INTRAMUSCULAR; INTRAVENOUS PRN
Status: DISCONTINUED | OUTPATIENT
Start: 2023-07-13 | End: 2023-07-13 | Stop reason: SDUPTHER

## 2023-07-13 RX ORDER — ACETAMINOPHEN 500 MG
1000 TABLET ORAL ONCE
Status: COMPLETED | OUTPATIENT
Start: 2023-07-13 | End: 2023-07-13

## 2023-07-13 RX ADMIN — SODIUM CHLORIDE, POTASSIUM CHLORIDE, SODIUM LACTATE AND CALCIUM CHLORIDE: 600; 310; 30; 20 INJECTION, SOLUTION INTRAVENOUS at 15:45

## 2023-07-13 RX ADMIN — HYDROMORPHONE HYDROCHLORIDE 0.5 MG: 2 INJECTION, SOLUTION INTRAMUSCULAR; INTRAVENOUS; SUBCUTANEOUS at 18:32

## 2023-07-13 RX ADMIN — DICYCLOMINE HYDROCHLORIDE 20 MG: 20 TABLET ORAL at 21:30

## 2023-07-13 RX ADMIN — SODIUM CHLORIDE, PRESERVATIVE FREE 40 MG: 5 INJECTION INTRAVENOUS at 09:25

## 2023-07-13 RX ADMIN — SODIUM CHLORIDE, PRESERVATIVE FREE 40 MG: 5 INJECTION INTRAVENOUS at 21:31

## 2023-07-13 RX ADMIN — ONDANSETRON 4 MG: 2 INJECTION INTRAMUSCULAR; INTRAVENOUS at 17:16

## 2023-07-13 RX ADMIN — DICYCLOMINE HYDROCHLORIDE 20 MG: 20 TABLET ORAL at 11:39

## 2023-07-13 RX ADMIN — ROCURONIUM BROMIDE 10 MG: 50 INJECTION, SOLUTION INTRAVENOUS at 17:05

## 2023-07-13 RX ADMIN — Medication 160 MG: at 16:27

## 2023-07-13 RX ADMIN — SUCRALFATE 1 G: 1 TABLET ORAL at 11:39

## 2023-07-13 RX ADMIN — GLYCOPYRROLATE 0.6 MG: 0.2 INJECTION INTRAMUSCULAR; INTRAVENOUS at 17:50

## 2023-07-13 RX ADMIN — ACETAMINOPHEN 1000 MG: 500 TABLET, FILM COATED ORAL at 15:38

## 2023-07-13 RX ADMIN — MORPHINE SULFATE 4 MG: 2 INJECTION, SOLUTION INTRAMUSCULAR; INTRAVENOUS at 06:00

## 2023-07-13 RX ADMIN — ONDANSETRON 4 MG: 2 INJECTION INTRAMUSCULAR; INTRAVENOUS at 10:20

## 2023-07-13 RX ADMIN — FENTANYL CITRATE 100 MCG: 50 INJECTION, SOLUTION INTRAMUSCULAR; INTRAVENOUS at 16:26

## 2023-07-13 RX ADMIN — LIDOCAINE HYDROCHLORIDE 100 MG: 20 INJECTION, SOLUTION EPIDURAL; INFILTRATION; INTRACAUDAL; PERINEURAL at 16:26

## 2023-07-13 RX ADMIN — SUCRALFATE 1 G: 1 TABLET ORAL at 05:33

## 2023-07-13 RX ADMIN — FAMOTIDINE 20 MG: 20 TABLET, FILM COATED ORAL at 15:39

## 2023-07-13 RX ADMIN — Medication 10 MG: at 17:04

## 2023-07-13 RX ADMIN — MORPHINE SULFATE 4 MG: 2 INJECTION, SOLUTION INTRAMUSCULAR; INTRAVENOUS at 09:26

## 2023-07-13 RX ADMIN — METRONIDAZOLE 500 MG: 500 INJECTION, SOLUTION INTRAVENOUS at 15:37

## 2023-07-13 RX ADMIN — ONDANSETRON 4 MG: 2 INJECTION INTRAMUSCULAR; INTRAVENOUS at 06:00

## 2023-07-13 RX ADMIN — SODIUM CHLORIDE, PRESERVATIVE FREE 10 ML: 5 INJECTION INTRAVENOUS at 22:04

## 2023-07-13 RX ADMIN — PROPOFOL 300 MG: 10 INJECTION, EMULSION INTRAVENOUS at 16:27

## 2023-07-13 RX ADMIN — SODIUM CHLORIDE, POTASSIUM CHLORIDE, SODIUM LACTATE AND CALCIUM CHLORIDE: 600; 310; 30; 20 INJECTION, SOLUTION INTRAVENOUS at 10:13

## 2023-07-13 RX ADMIN — SODIUM CHLORIDE, PRESERVATIVE FREE 10 ML: 5 INJECTION INTRAVENOUS at 09:26

## 2023-07-13 RX ADMIN — HYDROMORPHONE HYDROCHLORIDE 0.5 MG: 2 INJECTION, SOLUTION INTRAMUSCULAR; INTRAVENOUS; SUBCUTANEOUS at 18:08

## 2023-07-13 RX ADMIN — MAGNESIUM SULFATE HEPTAHYDRATE 2000 MG: 40 INJECTION, SOLUTION INTRAVENOUS at 12:20

## 2023-07-13 RX ADMIN — Medication 10 MG: at 17:27

## 2023-07-13 RX ADMIN — ONDANSETRON 4 MG: 2 INJECTION INTRAMUSCULAR; INTRAVENOUS at 01:48

## 2023-07-13 RX ADMIN — DEXAMETHASONE SODIUM PHOSPHATE 4 MG: 4 INJECTION, SOLUTION INTRAMUSCULAR; INTRAVENOUS at 16:42

## 2023-07-13 RX ADMIN — TRAMADOL HYDROCHLORIDE 50 MG: 50 TABLET ORAL at 22:03

## 2023-07-13 RX ADMIN — ENOXAPARIN SODIUM 30 MG: 100 INJECTION SUBCUTANEOUS at 21:29

## 2023-07-13 RX ADMIN — Medication 4 MG: at 17:50

## 2023-07-13 RX ADMIN — MIDAZOLAM 2 MG: 1 INJECTION INTRAMUSCULAR; INTRAVENOUS at 15:41

## 2023-07-13 RX ADMIN — MORPHINE SULFATE 4 MG: 2 INJECTION, SOLUTION INTRAMUSCULAR; INTRAVENOUS at 01:48

## 2023-07-13 RX ADMIN — ROCURONIUM BROMIDE 40 MG: 50 INJECTION, SOLUTION INTRAVENOUS at 16:39

## 2023-07-13 RX ADMIN — DICYCLOMINE HYDROCHLORIDE 20 MG: 20 TABLET ORAL at 05:33

## 2023-07-13 RX ADMIN — HYDROMORPHONE HYDROCHLORIDE 0.5 MG: 2 INJECTION, SOLUTION INTRAMUSCULAR; INTRAVENOUS; SUBCUTANEOUS at 18:40

## 2023-07-13 RX ADMIN — ONDANSETRON 4 MG: 2 INJECTION INTRAMUSCULAR; INTRAVENOUS at 18:51

## 2023-07-13 RX ADMIN — ATORVASTATIN CALCIUM 80 MG: 40 TABLET, FILM COATED ORAL at 09:25

## 2023-07-13 RX ADMIN — Medication 3000 MG: at 16:31

## 2023-07-13 RX ADMIN — DULOXETINE HYDROCHLORIDE 30 MG: 30 CAPSULE, DELAYED RELEASE ORAL at 09:25

## 2023-07-13 RX ADMIN — HYDROMORPHONE HYDROCHLORIDE 0.5 MG: 2 INJECTION, SOLUTION INTRAMUSCULAR; INTRAVENOUS; SUBCUTANEOUS at 18:20

## 2023-07-13 ASSESSMENT — PAIN DESCRIPTION - FREQUENCY
FREQUENCY: CONTINUOUS
FREQUENCY: CONTINUOUS

## 2023-07-13 ASSESSMENT — PAIN SCALES - GENERAL
PAINLEVEL_OUTOF10: 9
PAINLEVEL_OUTOF10: 10
PAINLEVEL_OUTOF10: 1
PAINLEVEL_OUTOF10: 1
PAINLEVEL_OUTOF10: 5
PAINLEVEL_OUTOF10: 9
PAINLEVEL_OUTOF10: 8
PAINLEVEL_OUTOF10: 10
PAINLEVEL_OUTOF10: 0

## 2023-07-13 ASSESSMENT — PAIN DESCRIPTION - LOCATION
LOCATION: ABDOMEN

## 2023-07-13 ASSESSMENT — PAIN DESCRIPTION - ORIENTATION
ORIENTATION: RIGHT;UPPER
ORIENTATION: RIGHT;UPPER
ORIENTATION: ANTERIOR
ORIENTATION: MID;LOWER
ORIENTATION: RIGHT;INNER
ORIENTATION: RIGHT;UPPER

## 2023-07-13 ASSESSMENT — PAIN DESCRIPTION - ONSET
ONSET: ON-GOING
ONSET: ON-GOING

## 2023-07-13 ASSESSMENT — PAIN - FUNCTIONAL ASSESSMENT
PAIN_FUNCTIONAL_ASSESSMENT: 0-10
PAIN_FUNCTIONAL_ASSESSMENT: PREVENTS OR INTERFERES SOME ACTIVE ACTIVITIES AND ADLS
PAIN_FUNCTIONAL_ASSESSMENT: PREVENTS OR INTERFERES SOME ACTIVE ACTIVITIES AND ADLS

## 2023-07-13 ASSESSMENT — PAIN DESCRIPTION - DESCRIPTORS
DESCRIPTORS: ACHING
DESCRIPTORS: ACHING;DISCOMFORT
DESCRIPTORS: ACHING;DISCOMFORT

## 2023-07-13 ASSESSMENT — PAIN DESCRIPTION - PAIN TYPE
TYPE: ACUTE PAIN
TYPE: ACUTE PAIN

## 2023-07-13 ASSESSMENT — PAIN SCALES - WONG BAKER: WONGBAKER_NUMERICALRESPONSE: 0

## 2023-07-13 NOTE — ANESTHESIA POSTPROCEDURE EVALUATION
Department of Anesthesiology  Postprocedure Note    Patient: Drake Acosta  MRN: [id number]  YOB: 1985  Date of evaluation: 7/13/2023      Procedure Summary     Date: 07/13/23 Room / Location: [facility] MAIN OR 02 / [facility] MAIN OR    Anesthesia Start: 1618 Anesthesia Stop: 1807    Procedure: CHOLECYSTECTOMY LAPAROSCOPIC (Abdomen) Diagnosis:       Chronic cholecystitis      (Chronic cholecystitis [K81.1])    Providers: Aureliano Hogan MD Responsible Provider: Stefan Schulz MD    Anesthesia Type: general ASA Status: 3          Anesthesia Type: No value filed.     Tish Phase I: Tish Score: 8    Tish Phase II:        Anesthesia Post Evaluation    Patient location during evaluation: bedside  Patient participation: complete - patient participated  Level of consciousness: awake and alert  Airway patency: patent  Nausea & Vomiting: no vomiting  Complications: no  Cardiovascular status: hemodynamically stable  Respiratory status: acceptable  Hydration status: euvolemic

## 2023-07-13 NOTE — ANESTHESIA PROCEDURE NOTES
Airway  Date/Time: 7/13/2023 4:27 PM  Urgency: elective    Airway not difficult    General Information and Staff    Patient location during procedure: OR  Performed: resident/CRNA     Indications and Patient Condition  Indications for airway management: anesthesia  Spontaneous Ventilation: absent  Sedation level: deep  Preoxygenated: yes  Patient position: sniffing  MILS not maintained throughout  Mask difficulty assessment: not attempted    Final Airway Details  Final airway type: endotracheal airway      Successful airway: ETT  Cuffed: yes   Successful intubation technique: direct laryngoscopy  Facilitating devices/methods: intubating stylet  Endotracheal tube insertion site: oral  Blade: Norma  Blade size: #4  ETT size (mm): 8.0  Cormack-Lehane Classification: grade I - full view of glottis  Placement verified by: chest auscultation and capnometry   Measured from: lips  ETT to lips (cm): 24  Number of attempts at approach: 1  Ventilation between attempts: bag mask  Number of other approaches attempted: 0    Additional Comments  rsi  no

## 2023-07-13 NOTE — PERIOP NOTE
Patient has spoken with ADRIEL WOOD. Consent has been verified, signed and witnessed by this RN. 2 mg of Versed given SIVP per orders. Pulse ox monitor in place & tracing appropriately. Bed in low, locked position with side rails up x 2 and call light in reach. Instructed pt to call with any needs and to remain in bed. Verbalizes understanding. Wife at bedside.

## 2023-07-13 NOTE — BRIEF OP NOTE
Brief Postoperative Note      Patient: Jayna Dunlap  YOB: 1985  MRN: [id number]    Date of Procedure: 7/13/2023    Pre-Op Diagnosis Codes:     * Chronic cholecystitis [K81.1]    Post-Op Diagnosis: Same       Procedure(s):  CHOLECYSTECTOMY LAPAROSCOPIC with MORIS added 30 minutues    Surgeon(s):  Maggie Kan MD    Assistant:  Surgical Assistant: Sharon Goss    Anesthesia: General    Estimated Blood Loss (mL): Minimal    Complications: None    Specimens:   ID Type Source Tests Collected by Time Destination   A : Gallbladder Tissue Gallbladder SURGICAL PATHOLOGY Maggie Kan MD 7/13/2023 1704        Implants:  * No implants in log *      Drains: * No LDAs found *    Findings: morbid obesity;  adhesion in right abdomen and around gallblader      Electronically signed by Maggie Kan MD on 7/13/2023 at 5:40 PM

## 2023-07-13 NOTE — CARE COORDINATION
Patient chart reviewed for continued stay. Patient going for cholecystectomy today, open vs laparoscopic. Patient should still discharge home once medically ready. Will continue to follow patient's plan of care and assist further with supportive care needs as appropriate.

## 2023-07-14 LAB
ANION GAP SERPL CALC-SCNC: 4 MMOL/L (ref 2–11)
BASOPHILS # BLD: 0 K/UL (ref 0–0.2)
BASOPHILS NFR BLD: 0 % (ref 0–2)
BUN SERPL-MCNC: 10 MG/DL (ref 6–23)
CALCIUM SERPL-MCNC: 9.3 MG/DL (ref 8.3–10.4)
CHLORIDE SERPL-SCNC: 103 MMOL/L (ref 101–110)
CO2 SERPL-SCNC: 29 MMOL/L (ref 21–32)
CREAT SERPL-MCNC: 1.1 MG/DL (ref 0.8–1.5)
DIFFERENTIAL METHOD BLD: ABNORMAL
EOSINOPHIL # BLD: 0 K/UL (ref 0–0.8)
EOSINOPHIL NFR BLD: 0 % (ref 0.5–7.8)
ERYTHROCYTE [DISTWIDTH] IN BLOOD BY AUTOMATED COUNT: 14 % (ref 11.9–14.6)
GLUCOSE BLD STRIP.AUTO-MCNC: 121 MG/DL (ref 65–100)
GLUCOSE BLD STRIP.AUTO-MCNC: 132 MG/DL (ref 65–100)
GLUCOSE BLD STRIP.AUTO-MCNC: 141 MG/DL (ref 65–100)
GLUCOSE BLD STRIP.AUTO-MCNC: 146 MG/DL (ref 65–100)
GLUCOSE SERPL-MCNC: 127 MG/DL (ref 65–100)
HCT VFR BLD AUTO: 43.2 % (ref 41.1–50.3)
HGB BLD-MCNC: 13.9 G/DL (ref 13.6–17.2)
IMM GRANULOCYTES # BLD AUTO: 0.1 K/UL (ref 0–0.5)
IMM GRANULOCYTES NFR BLD AUTO: 1 % (ref 0–5)
LYMPHOCYTES # BLD: 0.7 K/UL (ref 0.5–4.6)
LYMPHOCYTES NFR BLD: 7 % (ref 13–44)
MCH RBC QN AUTO: 24.8 PG (ref 26.1–32.9)
MCHC RBC AUTO-ENTMCNC: 32.2 G/DL (ref 31.4–35)
MCV RBC AUTO: 77.1 FL (ref 82–102)
MONOCYTES # BLD: 0.4 K/UL (ref 0.1–1.3)
MONOCYTES NFR BLD: 4 % (ref 4–12)
NEUTS SEG # BLD: 7.9 K/UL (ref 1.7–8.2)
NEUTS SEG NFR BLD: 88 % (ref 43–78)
NRBC # BLD: 0 K/UL (ref 0–0.2)
PLATELET # BLD AUTO: 300 K/UL (ref 150–450)
PMV BLD AUTO: 9.7 FL (ref 9.4–12.3)
POTASSIUM SERPL-SCNC: 4.3 MMOL/L (ref 3.5–5.1)
RBC # BLD AUTO: 5.6 M/UL (ref 4.23–5.6)
SERVICE CMNT-IMP: ABNORMAL
SODIUM SERPL-SCNC: 136 MMOL/L (ref 133–143)
WBC # BLD AUTO: 9 K/UL (ref 4.3–11.1)

## 2023-07-14 PROCEDURE — 6370000000 HC RX 637 (ALT 250 FOR IP): Performed by: FAMILY MEDICINE

## 2023-07-14 PROCEDURE — 82962 GLUCOSE BLOOD TEST: CPT

## 2023-07-14 PROCEDURE — 1100000000 HC RM PRIVATE

## 2023-07-14 PROCEDURE — 6370000000 HC RX 637 (ALT 250 FOR IP): Performed by: NURSE PRACTITIONER

## 2023-07-14 PROCEDURE — 6370000000 HC RX 637 (ALT 250 FOR IP): Performed by: INTERNAL MEDICINE

## 2023-07-14 PROCEDURE — 36415 COLL VENOUS BLD VENIPUNCTURE: CPT

## 2023-07-14 PROCEDURE — A4216 STERILE WATER/SALINE, 10 ML: HCPCS | Performed by: INTERNAL MEDICINE

## 2023-07-14 PROCEDURE — 6360000002 HC RX W HCPCS: Performed by: INTERNAL MEDICINE

## 2023-07-14 PROCEDURE — 80048 BASIC METABOLIC PNL TOTAL CA: CPT

## 2023-07-14 PROCEDURE — C9113 INJ PANTOPRAZOLE SODIUM, VIA: HCPCS | Performed by: INTERNAL MEDICINE

## 2023-07-14 PROCEDURE — 2580000003 HC RX 258: Performed by: INTERNAL MEDICINE

## 2023-07-14 PROCEDURE — 6370000000 HC RX 637 (ALT 250 FOR IP): Performed by: HOSPITALIST

## 2023-07-14 PROCEDURE — 85025 COMPLETE CBC W/AUTO DIFF WBC: CPT

## 2023-07-14 RX ORDER — BISACODYL 10 MG
10 SUPPOSITORY, RECTAL RECTAL DAILY
Status: DISCONTINUED | OUTPATIENT
Start: 2023-07-14 | End: 2023-07-15 | Stop reason: HOSPADM

## 2023-07-14 RX ORDER — SIMETHICONE 80 MG
80 TABLET,CHEWABLE ORAL EVERY 6 HOURS PRN
Status: DISCONTINUED | OUTPATIENT
Start: 2023-07-14 | End: 2023-07-15 | Stop reason: HOSPADM

## 2023-07-14 RX ORDER — ZOLPIDEM TARTRATE 5 MG/1
5 TABLET ORAL ONCE
Status: COMPLETED | OUTPATIENT
Start: 2023-07-14 | End: 2023-07-14

## 2023-07-14 RX ADMIN — SODIUM CHLORIDE, PRESERVATIVE FREE 10 ML: 5 INJECTION INTRAVENOUS at 20:49

## 2023-07-14 RX ADMIN — DICYCLOMINE HYDROCHLORIDE 20 MG: 20 TABLET ORAL at 11:21

## 2023-07-14 RX ADMIN — ATORVASTATIN CALCIUM 80 MG: 40 TABLET, FILM COATED ORAL at 08:30

## 2023-07-14 RX ADMIN — ENOXAPARIN SODIUM 30 MG: 100 INJECTION SUBCUTANEOUS at 08:29

## 2023-07-14 RX ADMIN — ZOLPIDEM TARTRATE 5 MG: 5 TABLET ORAL at 21:36

## 2023-07-14 RX ADMIN — DICYCLOMINE HYDROCHLORIDE 20 MG: 20 TABLET ORAL at 20:49

## 2023-07-14 RX ADMIN — SODIUM CHLORIDE, PRESERVATIVE FREE 40 MG: 5 INJECTION INTRAVENOUS at 21:36

## 2023-07-14 RX ADMIN — DICYCLOMINE HYDROCHLORIDE 20 MG: 20 TABLET ORAL at 05:05

## 2023-07-14 RX ADMIN — ENOXAPARIN SODIUM 30 MG: 100 INJECTION SUBCUTANEOUS at 20:49

## 2023-07-14 RX ADMIN — TRAMADOL HYDROCHLORIDE 50 MG: 50 TABLET ORAL at 17:57

## 2023-07-14 RX ADMIN — SODIUM CHLORIDE, PRESERVATIVE FREE 40 MG: 5 INJECTION INTRAVENOUS at 08:29

## 2023-07-14 RX ADMIN — SUCRALFATE 1 G: 1 TABLET ORAL at 17:56

## 2023-07-14 RX ADMIN — SUCRALFATE 1 G: 1 TABLET ORAL at 11:19

## 2023-07-14 RX ADMIN — DICYCLOMINE HYDROCHLORIDE 20 MG: 20 TABLET ORAL at 17:59

## 2023-07-14 RX ADMIN — SUCRALFATE 1 G: 1 TABLET ORAL at 05:05

## 2023-07-14 RX ADMIN — MORPHINE SULFATE 4 MG: 2 INJECTION, SOLUTION INTRAMUSCULAR; INTRAVENOUS at 00:25

## 2023-07-14 RX ADMIN — DULOXETINE HYDROCHLORIDE 30 MG: 30 CAPSULE, DELAYED RELEASE ORAL at 08:30

## 2023-07-14 RX ADMIN — SIMETHICONE 80 MG: 80 TABLET, CHEWABLE ORAL at 11:19

## 2023-07-14 RX ADMIN — TRAMADOL HYDROCHLORIDE 50 MG: 50 TABLET ORAL at 08:30

## 2023-07-14 RX ADMIN — SUCRALFATE 1 G: 1 TABLET ORAL at 00:16

## 2023-07-14 RX ADMIN — SODIUM CHLORIDE, PRESERVATIVE FREE 10 ML: 5 INJECTION INTRAVENOUS at 08:29

## 2023-07-14 RX ADMIN — BISACODYL 10 MG: 10 SUPPOSITORY RECTAL at 09:18

## 2023-07-14 RX ADMIN — MORPHINE SULFATE 4 MG: 2 INJECTION, SOLUTION INTRAMUSCULAR; INTRAVENOUS at 04:03

## 2023-07-14 RX ADMIN — INSULIN GLARGINE 12 UNITS: 100 INJECTION, SOLUTION SUBCUTANEOUS at 20:49

## 2023-07-14 RX ADMIN — ONDANSETRON 4 MG: 4 TABLET, ORALLY DISINTEGRATING ORAL at 21:36

## 2023-07-14 ASSESSMENT — PAIN DESCRIPTION - DESCRIPTORS
DESCRIPTORS: ACHING;BURNING
DESCRIPTORS: ACHING
DESCRIPTORS: ACHING;BURNING
DESCRIPTORS: ACHING

## 2023-07-14 ASSESSMENT — PAIN SCALES - WONG BAKER
WONGBAKER_NUMERICALRESPONSE: 0
WONGBAKER_NUMERICALRESPONSE: 0

## 2023-07-14 ASSESSMENT — PAIN DESCRIPTION - ORIENTATION
ORIENTATION: MID
ORIENTATION: ANTERIOR

## 2023-07-14 ASSESSMENT — PAIN DESCRIPTION - LOCATION
LOCATION: ABDOMEN

## 2023-07-14 ASSESSMENT — PAIN SCALES - GENERAL
PAINLEVEL_OUTOF10: 0
PAINLEVEL_OUTOF10: 6
PAINLEVEL_OUTOF10: 9
PAINLEVEL_OUTOF10: 6
PAINLEVEL_OUTOF10: 9

## 2023-07-14 NOTE — PLAN OF CARE
Problem: Discharge Planning  Goal: Discharge to home or other facility with appropriate resources  Outcome: Progressing  Flowsheets (Taken 7/13/2023 2000)  Discharge to home or other facility with appropriate resources: Identify barriers to discharge with patient and caregiver     Problem: Pain  Goal: Verbalizes/displays adequate comfort level or baseline comfort level  Outcome: Progressing     Problem: Chronic Conditions and Co-morbidities  Goal: Patient's chronic conditions and co-morbidity symptoms are monitored and maintained or improved  Outcome: Progressing     Problem: Safety - Adult  Goal: Free from fall injury  Outcome: Progressing

## 2023-07-14 NOTE — PLAN OF CARE
Problem: Discharge Planning  Goal: Discharge to home or other facility with appropriate resources  7/14/2023 0734 by Mariel Hollins RN  Outcome: Progressing  7/14/2023 0304 by Kerry Viera RN  Outcome: Progressing  Flowsheets (Taken 7/13/2023 2000)  Discharge to home or other facility with appropriate resources: Identify barriers to discharge with patient and caregiver     Problem: Pain  Goal: Verbalizes/displays adequate comfort level or baseline comfort level  7/14/2023 0734 by Mariel Hollins RN  Outcome: Progressing  7/14/2023 0304 by Kerry Viera RN  Outcome: Progressing

## 2023-07-14 NOTE — CARE COORDINATION
Patient chart reviewed for continued stay. Patient seen up walking the hallways today. Patient remains with poor pain control. Patient will likely discharge home tomorrow with no needs. Will continue to follow patient's plan of care and assist further with supportive care needs as appropriate.

## 2023-07-14 NOTE — OP NOTE
[facility]  OPERATIVE REPORT    Name:  Matty Pineda  MR#:  [id number]  :  1985  ACCOUNT #:  [de-identified]  DATE OF SERVICE:  2023    PREOPERATIVE DIAGNOSIS:  Chronic cholecystitis. POSTOPERATIVE DIAGNOSIS:  Chronic cholecystitis plus morbid obesity. PROCEDURE PERFORMED:  Laparoscopic cholecystectomy with lysis of adhesions added 30 minutes to the case. SURGEON:  MD Morris Garcia    ANESTHESIA:  general .    COMPLICATIONS:  none. SPECIMENS REMOVED:  as above. IMPLANTS:  none. ESTIMATED BLOOD LOSS:  Minimal.    INDICATION:  This is a 47-year-old male who presented with right upper quadrant pain, nausea, vomiting, and diarrhea for quite some time. His symptom was worse and he was admitted to hospital for more work up. Although his CT and ultrasound were essentially normal, HIDA scan showed decreased ejection fraction and his symptoms were mostly biliary in origin, although other etiology could be in play and Surgery Service was consulted and we felt gallbladder removal would be beneficial, although it may not be curative of all his medical issues. He fully understands the risks and benefits and agreed to proceed. FINDINGS:  He is morbidly obese, which posed quite a bit of challenge to the case and he does have adhesions in the right abdomen, also around the gallbladder which are pretty dense fatty adhesions indicating previous gallbladder attack. PROCEDURE:  After informed consent was obtained, the patient was brought into the operating room, left in supine position. General anesthesia was administered. The patient's abdomen was prepped and draped in routine fashion. He had a previous supraumbilical incision. I took the same incision, dissection carried down to the fascia and then the fascia was opened and a Shirin cannula inserted. Pneumoperitoneum created.   Then the patient was placed in reverse Trendelenburg position and I first placed an

## 2023-07-15 VITALS
DIASTOLIC BLOOD PRESSURE: 84 MMHG | RESPIRATION RATE: 14 BRPM | SYSTOLIC BLOOD PRESSURE: 121 MMHG | TEMPERATURE: 97.5 F | WEIGHT: 315 LBS | OXYGEN SATURATION: 92 % | BODY MASS INDEX: 42.66 KG/M2 | HEART RATE: 64 BPM | HEIGHT: 72 IN

## 2023-07-15 LAB
GLUCOSE BLD STRIP.AUTO-MCNC: 119 MG/DL (ref 65–100)
SERVICE CMNT-IMP: ABNORMAL

## 2023-07-15 PROCEDURE — 6370000000 HC RX 637 (ALT 250 FOR IP): Performed by: INTERNAL MEDICINE

## 2023-07-15 PROCEDURE — 6370000000 HC RX 637 (ALT 250 FOR IP): Performed by: FAMILY MEDICINE

## 2023-07-15 PROCEDURE — 6360000002 HC RX W HCPCS: Performed by: INTERNAL MEDICINE

## 2023-07-15 PROCEDURE — C9113 INJ PANTOPRAZOLE SODIUM, VIA: HCPCS | Performed by: INTERNAL MEDICINE

## 2023-07-15 PROCEDURE — A4216 STERILE WATER/SALINE, 10 ML: HCPCS | Performed by: INTERNAL MEDICINE

## 2023-07-15 PROCEDURE — 82962 GLUCOSE BLOOD TEST: CPT

## 2023-07-15 PROCEDURE — 99024 POSTOP FOLLOW-UP VISIT: CPT | Performed by: SURGERY

## 2023-07-15 PROCEDURE — 2580000003 HC RX 258: Performed by: INTERNAL MEDICINE

## 2023-07-15 RX ORDER — INSULIN GLARGINE 100 [IU]/ML
10 INJECTION, SOLUTION SUBCUTANEOUS DAILY
Status: DISCONTINUED | OUTPATIENT
Start: 2023-07-15 | End: 2023-07-15 | Stop reason: HOSPADM

## 2023-07-15 RX ORDER — LANOLIN ALCOHOL/MO/W.PET/CERES
3 CREAM (GRAM) TOPICAL ONCE
Status: COMPLETED | OUTPATIENT
Start: 2023-07-15 | End: 2023-07-15

## 2023-07-15 RX ORDER — POLYETHYLENE GLYCOL 3350 17 G/17G
17 POWDER, FOR SOLUTION ORAL DAILY PRN
Qty: 527 G | Refills: 0 | Status: SHIPPED | OUTPATIENT
Start: 2023-07-15 | End: 2023-08-14

## 2023-07-15 RX ORDER — BISACODYL 10 MG
10 SUPPOSITORY, RECTAL RECTAL DAILY
Qty: 30 SUPPOSITORY | Refills: 0 | Status: SHIPPED | OUTPATIENT
Start: 2023-07-16 | End: 2023-08-15

## 2023-07-15 RX ADMIN — SUCRALFATE 1 G: 1 TABLET ORAL at 00:08

## 2023-07-15 RX ADMIN — SUCRALFATE 1 G: 1 TABLET ORAL at 05:07

## 2023-07-15 RX ADMIN — ATORVASTATIN CALCIUM 80 MG: 40 TABLET, FILM COATED ORAL at 07:58

## 2023-07-15 RX ADMIN — DICYCLOMINE HYDROCHLORIDE 20 MG: 20 TABLET ORAL at 05:08

## 2023-07-15 RX ADMIN — SODIUM CHLORIDE, PRESERVATIVE FREE 10 ML: 5 INJECTION INTRAVENOUS at 07:59

## 2023-07-15 RX ADMIN — DULOXETINE HYDROCHLORIDE 30 MG: 30 CAPSULE, DELAYED RELEASE ORAL at 07:58

## 2023-07-15 RX ADMIN — Medication 3 MG: at 00:26

## 2023-07-15 RX ADMIN — SODIUM CHLORIDE, PRESERVATIVE FREE 40 MG: 5 INJECTION INTRAVENOUS at 05:07

## 2023-07-15 RX ADMIN — TRAMADOL HYDROCHLORIDE 50 MG: 50 TABLET ORAL at 00:08

## 2023-07-15 ASSESSMENT — PAIN - FUNCTIONAL ASSESSMENT: PAIN_FUNCTIONAL_ASSESSMENT: ACTIVITIES ARE NOT PREVENTED

## 2023-07-15 ASSESSMENT — PAIN DESCRIPTION - DESCRIPTORS: DESCRIPTORS: ACHING;DISCOMFORT

## 2023-07-15 ASSESSMENT — PAIN DESCRIPTION - LOCATION: LOCATION: ABDOMEN

## 2023-07-15 ASSESSMENT — PAIN DESCRIPTION - ORIENTATION: ORIENTATION: RIGHT

## 2023-07-15 ASSESSMENT — PAIN SCALES - GENERAL
PAINLEVEL_OUTOF10: 4
PAINLEVEL_OUTOF10: 1
PAINLEVEL_OUTOF10: 0

## 2023-07-15 NOTE — CARE COORDINATION
Pt is for discharge home today with family and no needs/supportive care orders recieved for CM at this time. 07/15/23 1312   Service Assessment   Patient's Healthcare Decision Maker is: Named in [address] History   Lives With Spouse   Type of [facility]  One level   Receives Help From [address] [employer] [facility] Yes   Occupation Full time employment   Services At/After Discharge   Transition of [address] [place] [address]  ([facility]) [address] Discharge None   [employer] Information Provided? No   Mode of Transport at Discharge Other (see comment)  (Spouse.)   Confirm Follow Up Transport Family   Condition of Participation: Discharge Planning   The Plan for Transition of Care is related to the following treatment goals: Pt will return home at discharge. The Patient and/Or Patient Representative agree with the Discharge Plan?  Yes

## 2023-07-15 NOTE — DISCHARGE INSTRUCTIONS
Discharge Instructions/Follow-up Plans:   MD Instructions: Follow-up with Dr. Akil Rutherford in 7-10 days  Keep incisions clean and dry, may remain uncovered. Do not apply lotions, creams or ointments to incisions. Diet - as tolerated - Soft foods diet. Activity - ambulate - as tolerated - no heavy lifting >10lb. May shower - no tub baths or soaking/submerging. No driving while taking narcotics. Do not drink alcohol while taking narcotics. Resume other home medications. If problems or questions arise, please call our office at [phone].

## 2023-07-15 NOTE — DISCHARGE SUMMARY
Hospitalist Discharge Summary   Admit Date:  7/10/2023  7:54 PM   DC Note date: 7/15/2023  Name:  Navneet Gauthier   Age:  42 y.o. Sex:  male  :  1985   MRN:  [id number]   Room:  [facility]  PCP:  ZULLY Juarez CNP    Presenting Complaint: No chief complaint on file. Initial Admission Diagnosis: RUQ pain [R10.11]  Chronic cholecystitis [K81.1]     Problem List for this Hospitalization (present on admission):    Principal Problem:    RUQ pain  Active Problems:    Diabetic polyneuropathy associated with type 2 diabetes mellitus ([address])    Hypercholesterolemia    Severe obesity (HCC)    Gastroesophageal reflux disease without esophagitis    Microcytic anemia    Chronic cholecystitis  Resolved Problems:    * No resolved hospital problems. *      Hospital Course:  Navneet Gauthier is a 42 y.o. male with medical history of obesity, T2DM, HLD, GERD who presented with RUQ pain, heart burn, epigastric pain, belching, vomiting and diarrhea x 24 days. Seen at PCP office on  with symptoms, referred to GI and pepcid 40 BID added to omeprazole 40 BID. Seen in ED on  - CT a/p no acute findings   Seen in ED again- left prior to ABD us could be performed. Returned to [facility] ED with continued symptoms. Pt smokes marijuana daily. RUQ US showed no evidence of cholelithiasis or acute cholecystitis. No biliary ductal dilatation. EGD unremarkable, no gastritis or retained food. He was treated with PPI and Carafate.  - HIDA scan positive. Surgery consulted. Patient underwent laparoscopic cholecystectomy and lysis of adhesions. His abdominal pain improved. He was constipated and last bowel movement was on 7/10. He refused to take medications and wants to go home. He wants to take medications at home. Patient is hemodynamically stable for discharge. Diabetic polyneuropathy associated with type 2 diabetes mellitus ([address])  -Continue home medications       Severe obesity - adds to complexity.

## 2023-07-20 ENCOUNTER — OFFICE VISIT (OUTPATIENT)
Dept: GASTROENTEROLOGY | Age: 38
End: 2023-07-20

## 2023-07-20 VITALS
HEART RATE: 73 BPM | DIASTOLIC BLOOD PRESSURE: 105 MMHG | BODY MASS INDEX: 42.31 KG/M2 | RESPIRATION RATE: 18 BRPM | OXYGEN SATURATION: 95 % | WEIGHT: 312 LBS | SYSTOLIC BLOOD PRESSURE: 158 MMHG

## 2023-07-20 DIAGNOSIS — K21.9 GASTROESOPHAGEAL REFLUX DISEASE WITHOUT ESOPHAGITIS: ICD-10-CM

## 2023-07-20 DIAGNOSIS — R11.2 NAUSEA AND VOMITING, UNSPECIFIED VOMITING TYPE: Primary | ICD-10-CM

## 2023-07-20 RX ORDER — SUCRALFATE 1 G/1
1 TABLET ORAL 4 TIMES DAILY
Qty: 120 TABLET | Refills: 0 | Status: SHIPPED | OUTPATIENT
Start: 2023-07-20

## 2023-07-20 NOTE — PATIENT INSTRUCTIONS
For reflux:   Eat smaller and more frequent meals. Avoid lying down for 3 hours after eating. Elevate the head of the bed by 6 inches. Avoid wearing tight-fitting clothing. Stop smoking and avoid alcohol. Consider weight loss to get to a healthy weight, which is often critical to eliminating symptoms of reflux. Avoid foods and acid-containing beverages that can exacerbate the symptoms of reflux.  This includes:     -Caffeine  -Chocolate  -Peppermint  -Alcohol (especially red wine)  -Carbonated beverages  -Citrus fruits  -Tomato-based products  -Vinegar  -Spicy and greasy foods 5

## 2023-07-20 NOTE — ASSESSMENT & PLAN NOTE
Describes regurgitation of undigested food. Improved while in the hospital while he'd been off Ozempic. Recurrent symptoms following resumption of Ozempic on Monday. Hold for now and monitor. Abdominal exam is reassuring Patient to reach out to PCP to discuss alternatives if symptoms improve while off the medication. A/w hx uncontrolled diabetes. Check GES to r/o gastroparesis. Counseled to eat more frequent smaller meals which he says he cannot do. Discussed indications for return to the ED: severe intractable abdominal pain, nausea, vomiting, inability to tolerate any PO. He says he will probably go to the ED tonight because he can't continue with these symptoms.

## 2023-07-20 NOTE — PROGRESS NOTES
Cedric Choudhury (:  1985) is a 42 y.o. male new patient referred to our office for evaluation of the following chief complaint(s):  Abdominal Pain, Nausea & Vomiting, and Diarrhea (Post cholecystectomy)             ASSESSMENT/PLAN:  1. Nausea and vomiting, unspecified vomiting type  Assessment & Plan:  Describes regurgitation of undigested food. Improved while in the hospital while he'd been off Ozempic. Recurrent symptoms following resumption of Ozempic on Monday. Hold for now and monitor. Abdominal exam is reassuring Patient to reach out to PCP to discuss alternatives if symptoms improve while off the medication. A/w hx uncontrolled diabetes. Check GES to r/o gastroparesis. Counseled to eat more frequent smaller meals which he says he cannot do. Discussed indications for return to the ED: severe intractable abdominal pain, nausea, vomiting, inability to tolerate any PO. He says he will probably go to the ED tonight because he can't continue with these symptoms. Orders:  -     NM GASTRIC EMPTYING; Future  2. Gastroesophageal reflux disease without esophagitis  Assessment & Plan:  Counseled patient and provided written recommendations for diet and lifestyle modifications for GERD. Avoid tobacco, alcohol, NSAIDs. Continue BID PPI, H2 blocker. Add Carafate. Subjective   SUBJECTIVE/OBJECTIVE  Cedric Choudhury is a 42y.o. year old male with PMH notable for HTN, HPL, IDDM, MO with BMI 43, DYLLAN, RLE DVT, TIA, GERD, depression, anxiety, prior suicide attempt, daily marijuana use. Surgical history is pertinent for remote appendectomy. Patient was recently admitted 7/10 to 7/15 for right upper quadrant pain and epigastric pain with nausea, vomiting, and diarrhea that was not responsive to BID PPI and BID H2 blocker. He had multiple recent ED visits with CT abdomen/pelvis  unrevealing.  Right upper quadrant ultrasound 7/10 showed distention without sludge, stones, pericholecystic fluid, wall

## 2023-07-20 NOTE — ASSESSMENT & PLAN NOTE
Counseled patient and provided written recommendations for diet and lifestyle modifications for GERD. Avoid tobacco, alcohol, NSAIDs. Continue BID PPI, H2 blocker. Add Carafate.

## 2023-07-21 NOTE — PROGRESS NOTES
tolerate increasing insulin due to possible hypoglycemic episodes. Will hold of at this time. Refilled cymbalta. Adderall- he stated his pain was so bad he had to take a gummy so he wanted to hold off on drug testing at this time- told him he has to abstain from all illegal substances to get controlled substance prescribed. Anticipatory Guidance/Education:  Anticipatory guidance for age-seatbelts, avoid cell phone use in car (may use hands free), no texting while driving, avoid social drugs and tobacco, limit alcohol, fall safety, personal safety. Encourage healthy diet and exercise daily. Follow up 3 months. Lorne Luong, ZULLY - CNP   I have spent 35 minutes reviewing previous notes, test results with the patient, discussing the diagnosis and importance of compliance with the treatment plan as well as documenting on the day of the visit.

## 2023-07-24 ENCOUNTER — OFFICE VISIT (OUTPATIENT)
Dept: FAMILY MEDICINE CLINIC | Facility: CLINIC | Age: 38
End: 2023-07-24
Payer: COMMERCIAL

## 2023-07-24 VITALS
DIASTOLIC BLOOD PRESSURE: 84 MMHG | BODY MASS INDEX: 41.72 KG/M2 | WEIGHT: 308 LBS | TEMPERATURE: 96.8 F | SYSTOLIC BLOOD PRESSURE: 138 MMHG | RESPIRATION RATE: 16 BRPM | HEART RATE: 77 BPM | HEIGHT: 72 IN | OXYGEN SATURATION: 96 %

## 2023-07-24 DIAGNOSIS — E11.65 UNCONTROLLED TYPE 2 DIABETES MELLITUS WITH HYPERGLYCEMIA (HCC): Primary | ICD-10-CM

## 2023-07-24 DIAGNOSIS — E66.01 SEVERE OBESITY (HCC): ICD-10-CM

## 2023-07-24 DIAGNOSIS — F32.A ANXIETY AND DEPRESSION: ICD-10-CM

## 2023-07-24 DIAGNOSIS — F41.9 ANXIETY AND DEPRESSION: ICD-10-CM

## 2023-07-24 PROCEDURE — 99214 OFFICE O/P EST MOD 30 MIN: CPT | Performed by: NURSE PRACTITIONER

## 2023-07-24 PROCEDURE — 3075F SYST BP GE 130 - 139MM HG: CPT | Performed by: NURSE PRACTITIONER

## 2023-07-24 PROCEDURE — 3079F DIAST BP 80-89 MM HG: CPT | Performed by: NURSE PRACTITIONER

## 2023-07-24 PROCEDURE — 3046F HEMOGLOBIN A1C LEVEL >9.0%: CPT | Performed by: NURSE PRACTITIONER

## 2023-07-24 RX ORDER — PROCHLORPERAZINE 25 MG/1
SUPPOSITORY RECTAL
Qty: 1 EACH | Refills: 3 | Status: SHIPPED | OUTPATIENT
Start: 2023-07-24

## 2023-07-24 RX ORDER — PROCHLORPERAZINE 25 MG/1
SUPPOSITORY RECTAL
COMMUNITY
Start: 2023-07-02

## 2023-07-24 RX ORDER — DULOXETIN HYDROCHLORIDE 30 MG/1
30 CAPSULE, DELAYED RELEASE ORAL DAILY
Qty: 90 CAPSULE | Refills: 1 | Status: SHIPPED | OUTPATIENT
Start: 2023-07-24

## 2023-07-24 SDOH — ECONOMIC STABILITY: FOOD INSECURITY: WITHIN THE PAST 12 MONTHS, YOU WORRIED THAT YOUR FOOD WOULD RUN OUT BEFORE YOU GOT MONEY TO BUY MORE.: NEVER TRUE

## 2023-07-24 SDOH — ECONOMIC STABILITY: FOOD INSECURITY: WITHIN THE PAST 12 MONTHS, THE FOOD YOU BOUGHT JUST DIDN'T LAST AND YOU DIDN'T HAVE MONEY TO GET MORE.: NEVER TRUE

## 2023-07-24 SDOH — ECONOMIC STABILITY: INCOME INSECURITY: HOW HARD IS IT FOR YOU TO PAY FOR THE VERY BASICS LIKE FOOD, HOUSING, MEDICAL CARE, AND HEATING?: NOT HARD AT ALL

## 2023-07-24 ASSESSMENT — PATIENT HEALTH QUESTIONNAIRE - PHQ9
2. FEELING DOWN, DEPRESSED OR HOPELESS: 0
SUM OF ALL RESPONSES TO PHQ QUESTIONS 1-9: 0
1. LITTLE INTEREST OR PLEASURE IN DOING THINGS: 0
SUM OF ALL RESPONSES TO PHQ QUESTIONS 1-9: 0
SUM OF ALL RESPONSES TO PHQ9 QUESTIONS 1 & 2: 0

## 2023-07-25 LAB
EST. AVERAGE GLUCOSE BLD GHB EST-MCNC: 148 MG/DL
HBA1C MFR BLD: 6.8 % (ref 4.8–5.6)

## 2023-07-31 ENCOUNTER — TELEPHONE (OUTPATIENT)
Dept: GASTROENTEROLOGY | Age: 38
End: 2023-07-31

## 2023-07-31 NOTE — TELEPHONE ENCOUNTER
[facility] can [facility] call patient to give him the scheduling information for his gastric emptying study please? He does not have it scheduled yet.    I called pt and pt was in a meeting and asked me to send info through [address] [place] [address] , I sent [employer] message with radiology number as requested by pt

## 2023-08-02 ASSESSMENT — ENCOUNTER SYMPTOMS
EYES NEGATIVE: 1
ABDOMINAL PAIN: 1
NAUSEA: 1
ALLERGIC/IMMUNOLOGIC NEGATIVE: 1
RESPIRATORY NEGATIVE: 1

## 2023-08-18 ENCOUNTER — TELEPHONE (OUTPATIENT)
Dept: FAMILY MEDICINE CLINIC | Facility: CLINIC | Age: 38
End: 2023-08-18

## 2023-08-18 DIAGNOSIS — E11.42 DIABETIC POLYNEUROPATHY ASSOCIATED WITH TYPE 2 DIABETES MELLITUS (HCC): Primary | ICD-10-CM

## 2023-08-18 RX ORDER — INSULIN LISPRO 100 [IU]/ML
INJECTION, SOLUTION INTRAVENOUS; SUBCUTANEOUS
Qty: 20 ADJUSTABLE DOSE PRE-FILLED PEN SYRINGE | Refills: 2 | Status: SHIPPED | OUTPATIENT
Start: 2023-08-18

## 2023-08-21 ENCOUNTER — TELEPHONE (OUTPATIENT)
Dept: FAMILY MEDICINE CLINIC | Facility: CLINIC | Age: 38
End: 2023-08-21

## 2023-08-21 NOTE — TELEPHONE ENCOUNTER
Regarding: FW: Med  Contact: [phone]    ----- Message -----  From: Willie Gregorio  Sent: 8/21/2023   1:54 PM EDT  To: [facility] Clinical Staff  Subject: Med                                              I thought she was calling me in a long lasting insulin on Friday when I got to pick it up it was the fast acting right now my sugar is 366 and I can never get it below 200

## 2023-09-18 ENCOUNTER — OFFICE VISIT (OUTPATIENT)
Dept: FAMILY MEDICINE CLINIC | Facility: CLINIC | Age: 38
End: 2023-09-18
Payer: COMMERCIAL

## 2023-09-18 VITALS
HEIGHT: 72 IN | WEIGHT: 315 LBS | BODY MASS INDEX: 42.66 KG/M2 | HEART RATE: 81 BPM | SYSTOLIC BLOOD PRESSURE: 128 MMHG | DIASTOLIC BLOOD PRESSURE: 88 MMHG | RESPIRATION RATE: 16 BRPM | OXYGEN SATURATION: 98 % | TEMPERATURE: 97.3 F

## 2023-09-18 DIAGNOSIS — R05.1 ACUTE COUGH: Primary | ICD-10-CM

## 2023-09-18 LAB
EXP DATE SOLUTION: NORMAL
EXP DATE SWAB: NORMAL
EXPIRATION DATE: NORMAL
GROUP A STREP ANTIGEN, POC: NEGATIVE
INFLUENZA A ANTIGEN, POC: NEGATIVE
INFLUENZA B ANTIGEN, POC: NEGATIVE
LOT NUMBER POC: NORMAL
LOT NUMBER SOLUTION: NORMAL
LOT NUMBER SWAB: NORMAL
SARS-COV-2 RNA, POC: NEGATIVE
VALID INTERNAL CONTROL, POC: YES
VALID INTERNAL CONTROL, POC: YES

## 2023-09-18 PROCEDURE — 3079F DIAST BP 80-89 MM HG: CPT | Performed by: NURSE PRACTITIONER

## 2023-09-18 PROCEDURE — 87804 INFLUENZA ASSAY W/OPTIC: CPT | Performed by: NURSE PRACTITIONER

## 2023-09-18 PROCEDURE — 3074F SYST BP LT 130 MM HG: CPT | Performed by: NURSE PRACTITIONER

## 2023-09-18 PROCEDURE — 99214 OFFICE O/P EST MOD 30 MIN: CPT | Performed by: NURSE PRACTITIONER

## 2023-09-18 PROCEDURE — 87880 STREP A ASSAY W/OPTIC: CPT | Performed by: NURSE PRACTITIONER

## 2023-09-18 PROCEDURE — 87635 SARS-COV-2 COVID-19 AMP PRB: CPT | Performed by: NURSE PRACTITIONER

## 2023-09-18 RX ORDER — AZITHROMYCIN 250 MG/1
250 TABLET, FILM COATED ORAL SEE ADMIN INSTRUCTIONS
Qty: 6 TABLET | Refills: 0 | Status: SHIPPED | OUTPATIENT
Start: 2023-09-18 | End: 2023-09-23

## 2023-09-18 SDOH — ECONOMIC STABILITY: FOOD INSECURITY: WITHIN THE PAST 12 MONTHS, YOU WORRIED THAT YOUR FOOD WOULD RUN OUT BEFORE YOU GOT MONEY TO BUY MORE.: NEVER TRUE

## 2023-09-18 SDOH — ECONOMIC STABILITY: FOOD INSECURITY: WITHIN THE PAST 12 MONTHS, THE FOOD YOU BOUGHT JUST DIDN'T LAST AND YOU DIDN'T HAVE MONEY TO GET MORE.: NEVER TRUE

## 2023-09-18 SDOH — ECONOMIC STABILITY: INCOME INSECURITY: HOW HARD IS IT FOR YOU TO PAY FOR THE VERY BASICS LIKE FOOD, HOUSING, MEDICAL CARE, AND HEATING?: NOT HARD AT ALL

## 2023-10-09 ENCOUNTER — OFFICE VISIT (OUTPATIENT)
Dept: FAMILY MEDICINE CLINIC | Facility: CLINIC | Age: 38
End: 2023-10-09
Payer: COMMERCIAL

## 2023-10-09 VITALS
WEIGHT: 308 LBS | TEMPERATURE: 97.5 F | BODY MASS INDEX: 41.77 KG/M2 | OXYGEN SATURATION: 98 % | HEART RATE: 83 BPM | DIASTOLIC BLOOD PRESSURE: 120 MMHG | SYSTOLIC BLOOD PRESSURE: 170 MMHG

## 2023-10-09 DIAGNOSIS — M79.661 PAIN IN RIGHT LOWER LEG: Primary | ICD-10-CM

## 2023-10-09 DIAGNOSIS — Z86.718 HISTORY OF DVT (DEEP VEIN THROMBOSIS): ICD-10-CM

## 2023-10-09 DIAGNOSIS — I82.4Y9 ACUTE DEEP VEIN THROMBOSIS (DVT) OF PROXIMAL VEIN OF LOWER EXTREMITY, UNSPECIFIED LATERALITY (HCC): ICD-10-CM

## 2023-10-09 PROCEDURE — 3075F SYST BP GE 130 - 139MM HG: CPT | Performed by: NURSE PRACTITIONER

## 2023-10-09 PROCEDURE — 99213 OFFICE O/P EST LOW 20 MIN: CPT | Performed by: NURSE PRACTITIONER

## 2023-10-09 PROCEDURE — 3080F DIAST BP >= 90 MM HG: CPT | Performed by: NURSE PRACTITIONER

## 2023-10-09 ASSESSMENT — PATIENT HEALTH QUESTIONNAIRE - PHQ9
2. FEELING DOWN, DEPRESSED OR HOPELESS: 0
9. THOUGHTS THAT YOU WOULD BE BETTER OFF DEAD, OR OF HURTING YOURSELF: 0
SUM OF ALL RESPONSES TO PHQ QUESTIONS 1-9: 0
3. TROUBLE FALLING OR STAYING ASLEEP: 0
SUM OF ALL RESPONSES TO PHQ QUESTIONS 1-9: 0
4. FEELING TIRED OR HAVING LITTLE ENERGY: 0
SUM OF ALL RESPONSES TO PHQ QUESTIONS 1-9: 0
SUM OF ALL RESPONSES TO PHQ QUESTIONS 1-9: 0
6. FEELING BAD ABOUT YOURSELF - OR THAT YOU ARE A FAILURE OR HAVE LET YOURSELF OR YOUR FAMILY DOWN: 0
8. MOVING OR SPEAKING SO SLOWLY THAT OTHER PEOPLE COULD HAVE NOTICED. OR THE OPPOSITE, BEING SO FIGETY OR RESTLESS THAT YOU HAVE BEEN MOVING AROUND A LOT MORE THAN USUAL: 0
SUM OF ALL RESPONSES TO PHQ9 QUESTIONS 1 & 2: 0
1. LITTLE INTEREST OR PLEASURE IN DOING THINGS: 0
10. IF YOU CHECKED OFF ANY PROBLEMS, HOW DIFFICULT HAVE THESE PROBLEMS MADE IT FOR YOU TO DO YOUR WORK, TAKE CARE OF THINGS AT HOME, OR GET ALONG WITH OTHER PEOPLE: 0
7. TROUBLE CONCENTRATING ON THINGS, SUCH AS READING THE NEWSPAPER OR WATCHING TELEVISION: 0
5. POOR APPETITE OR OVEREATING: 0

## 2023-10-09 NOTE — PROGRESS NOTES
Lupe Booker (: 1985) presents today c/o RLE extremity pain. Has history of uncontrolled diabetes, hypercholesterolemia, GERD, and CAD. HPI (RLE)-  Noticed it this morning as soon as he got out of bed. No swelling or redness. No traveling recently. Has periods of prolonged sitting at work since he works at [employer]. Was on a blood thinner after having a DVT after surgery in the past, but only took for 6 months and then got off of it. Explains it being a tight pain today. At times feels sharp and sometimes it's not. Walking/putting weight on it makes the pain worse. Nothing makes it better.             Chief Complaint   Patient presents with    Leg Pain       Right lower leg denies injury started this morning          Patient Active Problem List   Diagnosis    Coronary artery disease involving native coronary artery with angina pectoris with documented spasm ([address])    Diabetic polyneuropathy associated with type 2 diabetes mellitus ([address])    Hypercholesterolemia    Depression with anxiety    Severe obesity (HCC)    DYLLAN (obstructive sleep apnea)    Essential hypertension    Erectile dysfunction due to diseases classified elsewhere    Hypoxia    Uncontrolled diabetes mellitus with hyperglycemia (HCC)    Gastroesophageal reflux disease without esophagitis    History of transient ischemic attack (TIA)    Adhesive capsulitis of left shoulder    RUQ pain    Microcytic anemia    Chronic cholecystitis    Nausea and vomiting      Past Medical History        Past Medical History:   Diagnosis Date    Adhesive capsulitis of left shoulder 2023    Amputated toe (HCC)       right great toe    Arrhythmia       \" artery in heart goes into spams\"  no meds    Coronary artery disease involving native coronary artery with angina pectoris with documented spasm ([address])      Deep venous thrombosis ([address]) 2023    Depression with anxiety 2017    Essential hypertension 2018    Gastroesophageal reflux disease without

## 2023-10-09 NOTE — PROGRESS NOTES
Juliannta Germán (: 1985) presents today c/o    Chief Complaint   Patient presents with    Leg Pain     Right lower leg denies injury started this morning     Patient Active Problem List   Diagnosis    Coronary artery disease involving native coronary artery with angina pectoris with documented spasm ([address])    Diabetic polyneuropathy associated with type 2 diabetes mellitus ([address])    Hypercholesterolemia    Depression with anxiety    Severe obesity (HCC)    DYLLAN (obstructive sleep apnea)    Essential hypertension    Erectile dysfunction due to diseases classified elsewhere    Hypoxia    Uncontrolled diabetes mellitus with hyperglycemia (HCC)    Gastroesophageal reflux disease without esophagitis    History of transient ischemic attack (TIA)    Adhesive capsulitis of left shoulder    RUQ pain    Microcytic anemia    Chronic cholecystitis    Nausea and vomiting     Past Medical History:   Diagnosis Date    Adhesive capsulitis of left shoulder 2023    Amputated toe (HCC)     right great toe    Arrhythmia     \" artery in heart goes into spams\"  no meds    Coronary artery disease involving native coronary artery with angina pectoris with documented spasm (HCC)     Deep venous thrombosis ([address]) 2023    Depression with anxiety 2017    Essential hypertension 2018    Gastroesophageal reflux disease without esophagitis 2023    History of kidney stones     multiple with surgical intervention X1    History of TIA (transient ischemic attack)     no deficits    Hx of blood clots     history of Right DVT    Hypercholesterolemia     Left shoulder pain     Morbid obesity (HCC)     BMI 39    Nausea & vomiting     Sleep apnea     does not wear CPAP    Stroke ([address])     TIA per pt    Suicide attempt by benzodiazepine overdose ([address]) 10/26/2020    Type 2 diabetes mellitus (HCC)     insulin reliant/AVG FBS: 170-180/s.s of

## 2023-10-10 ENCOUNTER — HOSPITAL ENCOUNTER (OUTPATIENT)
Dept: ULTRASOUND IMAGING | Age: 38
Discharge: HOME | End: 2023-10-13
Payer: COMMERCIAL

## 2023-10-10 ENCOUNTER — TELEPHONE (OUTPATIENT)
Dept: FAMILY MEDICINE CLINIC | Facility: CLINIC | Age: 38
End: 2023-10-10

## 2023-10-10 DIAGNOSIS — M79.661 PAIN IN RIGHT LOWER LEG: ICD-10-CM

## 2023-10-10 DIAGNOSIS — Z86.718 HISTORY OF DVT (DEEP VEIN THROMBOSIS): ICD-10-CM

## 2023-10-10 PROCEDURE — 93971 EXTREMITY STUDY: CPT

## 2023-10-30 ENCOUNTER — HOSPITAL ENCOUNTER (EMERGENCY)
Age: 38
Discharge: HOME | End: 2023-10-30
Attending: EMERGENCY MEDICINE
Payer: COMMERCIAL

## 2023-10-30 VITALS
OXYGEN SATURATION: 98 % | HEART RATE: 86 BPM | SYSTOLIC BLOOD PRESSURE: 149 MMHG | HEIGHT: 72 IN | BODY MASS INDEX: 41.58 KG/M2 | RESPIRATION RATE: 16 BRPM | DIASTOLIC BLOOD PRESSURE: 87 MMHG | TEMPERATURE: 98 F | WEIGHT: 307 LBS

## 2023-10-30 DIAGNOSIS — R73.9 HYPERGLYCEMIA: Primary | ICD-10-CM

## 2023-10-30 DIAGNOSIS — E86.0 DEHYDRATION: ICD-10-CM

## 2023-10-30 LAB
ALBUMIN SERPL-MCNC: 3.7 G/DL (ref 3.5–5)
ALBUMIN/GLOB SERPL: 1.2 (ref 0.4–1.6)
ALP SERPL-CCNC: 150 U/L (ref 45–117)
ALT SERPL-CCNC: 41 U/L (ref 13–61)
ANION GAP SERPL CALC-SCNC: 10 MMOL/L (ref 2–11)
APPEARANCE UR: CLEAR
AST SERPL-CCNC: 23 U/L (ref 15–37)
BASOPHILS # BLD: 0.1 K/UL (ref 0–0.2)
BASOPHILS NFR BLD: 1 % (ref 0–2)
BILIRUB SERPL-MCNC: 0.2 MG/DL (ref 0.2–1.1)
BILIRUB UR QL: NEGATIVE
BUN SERPL-MCNC: 21 MG/DL (ref 6–23)
CALCIUM SERPL-MCNC: 9.1 MG/DL (ref 8.3–10.4)
CHLORIDE SERPL-SCNC: 99 MMOL/L (ref 98–107)
CHP ED QC CHECK: YES
CO2 SERPL-SCNC: 26 MMOL/L (ref 21–32)
COLOR UR: YELLOW
CREAT SERPL-MCNC: 1.07 MG/DL (ref 0.8–1.5)
DIFFERENTIAL METHOD BLD: ABNORMAL
EOSINOPHIL # BLD: 0.2 K/UL (ref 0–0.8)
EOSINOPHIL NFR BLD: 3 % (ref 0.5–7.8)
ERYTHROCYTE [DISTWIDTH] IN BLOOD BY AUTOMATED COUNT: 14.3 % (ref 11.9–14.6)
GLOBULIN SER CALC-MCNC: 3 G/DL (ref 2.8–4.5)
GLUCOSE BLD STRIP.AUTO-MCNC: 303 MG/DL (ref 65–100)
GLUCOSE BLD STRIP.AUTO-MCNC: 380 MG/DL (ref 65–100)
GLUCOSE BLD-MCNC: 380 MG/DL
GLUCOSE SERPL-MCNC: 427 MG/DL (ref 65–100)
GLUCOSE UR STRIP.AUTO-MCNC: >1000 MG/DL
HCT VFR BLD AUTO: 41.1 % (ref 41.1–50.3)
HGB BLD-MCNC: 13.4 G/DL (ref 13.6–17.2)
HGB UR QL STRIP: NEGATIVE
IMM GRANULOCYTES # BLD AUTO: 0 K/UL (ref 0–0.5)
IMM GRANULOCYTES NFR BLD AUTO: 0 % (ref 0–5)
KETONES UR QL STRIP.AUTO: NEGATIVE MG/DL
LEUKOCYTE ESTERASE UR QL STRIP.AUTO: NEGATIVE
LYMPHOCYTES # BLD: 1.3 K/UL (ref 0.5–4.6)
LYMPHOCYTES NFR BLD: 18 % (ref 13–44)
MAGNESIUM SERPL-MCNC: 1.6 MG/DL (ref 1.2–2.6)
MCH RBC QN AUTO: 25.3 PG (ref 26.1–32.9)
MCHC RBC AUTO-ENTMCNC: 32.6 G/DL (ref 31.4–35)
MCV RBC AUTO: 77.7 FL (ref 82–102)
MONOCYTES # BLD: 0.4 K/UL (ref 0.1–1.3)
MONOCYTES NFR BLD: 6 % (ref 4–12)
NEUTS SEG # BLD: 5.4 K/UL (ref 1.7–8.2)
NEUTS SEG NFR BLD: 72 % (ref 43–78)
NITRITE UR QL STRIP.AUTO: NEGATIVE
NRBC # BLD: 0 K/UL (ref 0–0.2)
PH UR STRIP: 5.5 (ref 5–9)
PLATELET # BLD AUTO: 236 K/UL (ref 150–450)
PMV BLD AUTO: 9.8 FL (ref 9.4–12.3)
POTASSIUM SERPL-SCNC: 4.5 MMOL/L (ref 3.5–5.1)
PROT SERPL-MCNC: 6.7 G/DL (ref 6.4–8.2)
PROT UR STRIP-MCNC: NEGATIVE MG/DL
RBC # BLD AUTO: 5.29 M/UL (ref 4.23–5.6)
SERVICE CMNT-IMP: ABNORMAL
SERVICE CMNT-IMP: ABNORMAL
SODIUM SERPL-SCNC: 135 MMOL/L (ref 133–143)
SP GR UR REFRACTOMETRY: 1.01 (ref 1–1.02)
UROBILINOGEN UR QL STRIP.AUTO: 0.2 EU/DL (ref 0.2–1)
WBC # BLD AUTO: 7.5 K/UL (ref 4.3–11.1)

## 2023-10-30 PROCEDURE — 83735 ASSAY OF MAGNESIUM: CPT

## 2023-10-30 PROCEDURE — 82962 GLUCOSE BLOOD TEST: CPT

## 2023-10-30 PROCEDURE — 6370000000 HC RX 637 (ALT 250 FOR IP): Performed by: EMERGENCY MEDICINE

## 2023-10-30 PROCEDURE — 99284 EMERGENCY DEPT VISIT MOD MDM: CPT

## 2023-10-30 PROCEDURE — 81003 URINALYSIS AUTO W/O SCOPE: CPT

## 2023-10-30 PROCEDURE — 2580000003 HC RX 258: Performed by: EMERGENCY MEDICINE

## 2023-10-30 PROCEDURE — 96360 HYDRATION IV INFUSION INIT: CPT

## 2023-10-30 PROCEDURE — 80053 COMPREHEN METABOLIC PANEL: CPT

## 2023-10-30 PROCEDURE — 85025 COMPLETE CBC W/AUTO DIFF WBC: CPT

## 2023-10-30 RX ORDER — 0.9 % SODIUM CHLORIDE 0.9 %
1000 INTRAVENOUS SOLUTION INTRAVENOUS
Status: COMPLETED | OUTPATIENT
Start: 2023-10-30 | End: 2023-10-30

## 2023-10-30 RX ADMIN — INSULIN HUMAN 20 UNITS: 100 INJECTION, SOLUTION PARENTERAL at 09:57

## 2023-10-30 RX ADMIN — SODIUM CHLORIDE 1000 ML: 9 INJECTION, SOLUTION INTRAVENOUS at 08:02

## 2023-10-30 ASSESSMENT — ENCOUNTER SYMPTOMS
CONSTIPATION: 0
VOMITING: 1
ABDOMINAL PAIN: 0
NAUSEA: 1
BLOOD IN STOOL: 0

## 2023-10-30 ASSESSMENT — PAIN - FUNCTIONAL ASSESSMENT: PAIN_FUNCTIONAL_ASSESSMENT: NONE - DENIES PAIN

## 2023-10-30 NOTE — ED TRIAGE NOTES
Pt states he has had blurred vision, nasuea vomiting, some dizziness this moring, states feels like blood sugar is elevated. Checked blood sugar last night at around 8 pm was 440, took insulin and went to bed, was unable to check sugar this am due to battery dead in meter. Was started on jardiance last week but took himself off of it due to making him use the bathroom too often.

## 2023-10-30 NOTE — ED PROVIDER NOTES
Ratio 1.2 0.4 - 1.6     Magnesium    Collection Time: 10/30/23  7:53 AM   Result Value Ref Range    Magnesium 1.6 1.2 - 2.6 mg/dL   POCT Glucose    Collection Time: 10/30/23  7:58 AM   Result Value Ref Range    Glucose 380 mg/dL    QC OK? yes    Urinalysis w rflx microscopic    Collection Time: 10/30/23  9:25 AM   Result Value Ref Range    Color, UA YELLOW      Appearance CLEAR      Specific Gravity, UA 1.010 1.001 - 1.023      pH, Urine 5.5 5.0 - 9.0      Protein, UA Negative NEG mg/dL    Glucose, UA >1000 mg/dL    Ketones, Urine Negative NEG mg/dL    Bilirubin Urine Negative NEG      Blood, Urine Negative NEG      Urobilinogen, Urine 0.2 0.2 - 1.0 EU/dL    Nitrite, Urine Negative NEG      Leukocyte Esterase, Urine Negative NEG     POCT Glucose    Collection Time: 10/30/23 11:11 AM   Result Value Ref Range    POC Glucose 303 (H) 65 - 100 mg/dL    Performed by: Antonio        I discussed the results of all labs, procedures, radiographs, and treatments with the patient and available family. Treatment plan is agreed upon and the patient is ready for discharge. All voiced understanding of the discharge plan and medication instructions or changes as appropriate. Questions about treatment in the ED were answered. All were encouraged to return should symptoms worsen or new problems develop. Voice dictation software was used during the making of this note. This software is not perfect and grammatical and other typographical errors may be present. This note has not been completely proofread for errors.      Yasmine Giron MD  10/31/23 4036

## 2023-11-06 NOTE — PROGRESS NOTES
NEW PATIENT INTAKE    Referral Diagnosis: Pain in right lower leg; History of DVT (deep vein thrombosis); Acute deep vein thrombosis (DVT) of proximal vein of lower extremity, unspecified laterality    Referring Provider: ZULLY Berger CNP    Primary Care Provider: ZULLY Berger CNP    Family History of Cancer/ Hematology Disorders: None reported     Presenting Symptoms: RLE extremity pain    Chronological History of Pertinent Events: Mr. Lily Paige is a 49-year-old white male referred to [facility] for LE DVT.     7/5/22: Presented to the [facility] ED with complaints of swelling/pain to his right lower leg for the past several days. He was approximately 5 weeks postop from metatarsal resection and debridement of the right foot for chronic osteomyelitis  -Vascular ultrasound venous right leg identified an acute DVT within the right femoral vein, popliteal vein, tibial veins and peroneal vein (CE/Other Results)  -Started on Xarelto    7/6/22: Seen at [facility]   -DVT likely be provoked in the setting of recent foot surgery   -Plan for likely 3 to 6-month course of anticoagulation    10/13/22: F/u at [facility]   -Pt reports persistent pain and swelling in his right foot. He stated he had been compliant with Xarelto up unit 10 days prior when he ran out of the medication  -Vascular ultrasound venous right leg showed chronic DVT within the right femoral vein and popliteal vein.  Limited evaluation of the left common femoral vein, profunda femoral vein, and proximal femoral vein was negative for DVT (CE/Other Results)  -Given that there did not appear to be substantial improvement in thrombus burden, continuation of anticoagulation beyond the 3-month point with hope of improved recanalization was recommended.   -Pt did not wish to continue anticoagulation, compression stockings, or venotonic supplement, as he did not feel like medical treatment thus far had helped him     10/17/22:

## 2023-12-19 ENCOUNTER — TELEPHONE (OUTPATIENT)
Dept: FAMILY MEDICINE CLINIC | Facility: CLINIC | Age: 38
End: 2023-12-19

## 2023-12-19 NOTE — TELEPHONE ENCOUNTER
Patient is requesting medication for covid, said went to [facility] and was tested positive for covid would like medication for covid.

## 2023-12-21 NOTE — TELEPHONE ENCOUNTER
I contacted patient and informed him that we have noted that he was going to transfer care from this practice and that is why we were unable to fill this prescription for this at this time. The patient states that he did not say he was going to transfer care at all. He states that he meant for seeing another doctor for his illness at the time since he was unable to get in to see Alexandria that day/week.     Patient states he does not want to transfer care. How would you like to proceed?

## 2024-01-13 ENCOUNTER — HOSPITAL ENCOUNTER (EMERGENCY)
Age: 39
Discharge: HOME | End: 2024-01-13
Attending: EMERGENCY MEDICINE
Payer: COMMERCIAL

## 2024-01-13 VITALS
OXYGEN SATURATION: 94 % | WEIGHT: 300 LBS | HEART RATE: 80 BPM | DIASTOLIC BLOOD PRESSURE: 91 MMHG | BODY MASS INDEX: 40.63 KG/M2 | HEIGHT: 72 IN | RESPIRATION RATE: 16 BRPM | TEMPERATURE: 98.8 F | SYSTOLIC BLOOD PRESSURE: 149 MMHG

## 2024-01-13 DIAGNOSIS — S90.422A BLISTER OF LEFT GREAT TOE, INITIAL ENCOUNTER: Primary | ICD-10-CM

## 2024-01-13 PROCEDURE — 99283 EMERGENCY DEPT VISIT LOW MDM: CPT

## 2024-01-13 PROCEDURE — 6370000000 HC RX 637 (ALT 250 FOR IP): Performed by: EMERGENCY MEDICINE

## 2024-01-13 RX ORDER — CEPHALEXIN 500 MG/1
500 CAPSULE ORAL 3 TIMES DAILY
Qty: 21 CAPSULE | Refills: 0 | Status: SHIPPED | OUTPATIENT
Start: 2024-01-13 | End: 2024-01-20

## 2024-01-13 RX ORDER — CEPHALEXIN 500 MG/1
500 CAPSULE ORAL
Status: COMPLETED | OUTPATIENT
Start: 2024-01-13 | End: 2024-01-13

## 2024-01-13 RX ADMIN — CEPHALEXIN 500 MG: 500 CAPSULE ORAL at 05:04

## 2024-01-13 ASSESSMENT — PAIN DESCRIPTION - LOCATION: LOCATION: TOE (COMMENT WHICH ONE)

## 2024-01-13 ASSESSMENT — PAIN SCALES - GENERAL: PAINLEVEL_OUTOF10: 6

## 2024-01-13 ASSESSMENT — PAIN DESCRIPTION - ORIENTATION: ORIENTATION: LEFT

## 2024-01-13 ASSESSMENT — PAIN - FUNCTIONAL ASSESSMENT: PAIN_FUNCTIONAL_ASSESSMENT: 0-10

## 2024-01-13 ASSESSMENT — PAIN DESCRIPTION - DESCRIPTORS: DESCRIPTORS: ACHING

## 2024-01-13 NOTE — ED TRIAGE NOTES
Pt arrives ambulatory in triage with complaint of left toe wound check . Pt has known diabetic foot ulcer on left great toe. Pt states he has an appointment on Monday for known wound. Pt states today noted skin flap around wound. Denies drainage. Wound edges approximated. No sign of infection at this time.

## 2024-01-13 NOTE — ED PROVIDER NOTES
Emergency Department Provider Note       PCP: Alexandria Gastelum, APRN - JESSICA   Age: 38 y.o.   Sex: male     DISPOSITION Decision To Discharge 01/13/2024 05:00:01 AM       ICD-10-CM    1. Blister of left great toe, initial encounter  S90.422A           Medical Decision Making     Complexity of Problems Addressed:  Complexity of Problem: 1 acute, uncomplicated illness or injury.    Data Reviewed and Analyzed:  I independently ordered and reviewed each unique test.     Discussion of management or test interpretation.  38-year-old  male with history of diabetes and diabetic neuropathy presents with complaint of large blister to the bottom of the left great toe.  Patient is currently being followed by dermatology for a ulcer to the left great toe but this week he was at a trade show and walked over 5 miles in his tennis shoes, noted earlier today that there was peeled back skin off the bottom of the great toe and discussed with his doctor who recommended he go to the ER the second he gets back from the trade show.  He denies any fever or chills.  On exam, the patient had a blister that was three quarters of the way torn off, which was subsequently cleaned with small amount of wound cleanser and the free piece of skin was debrided by myself.  Nursing applied some antibiotic ointment and a sterile dressing.  Patient be discharged home on a short course of Keflex instructed to follow-up with his dermatologist on Monday.       Risk of Complications and/or Morbidity of Patient Management:  Prescription drug management performed    History      38-year-old  male with history of diabetes and diabetic neuropathy presents with complaint of large blister to the bottom of the left great toe.  Patient is currently being followed by dermatology for a ulcer to the left great toe but this week he was at a trade show and walked over 5 miles in his tennis shoes, noted earlier today that there was peeled back skin off

## 2024-01-17 ENCOUNTER — TELEPHONE (OUTPATIENT)
Dept: FAMILY MEDICINE CLINIC | Facility: CLINIC | Age: 39
End: 2024-01-17

## 2024-01-17 RX ORDER — PROCHLORPERAZINE 25 MG/1
SUPPOSITORY RECTAL
Qty: 9 EACH | Refills: 2 | Status: SHIPPED | OUTPATIENT
Start: 2024-01-17

## 2024-01-17 NOTE — TELEPHONE ENCOUNTER
Patients Dexcom has . Please send in script as patient is concerned about going without it. [facility]  @ [facility].

## 2024-01-23 ENCOUNTER — OFFICE VISIT (OUTPATIENT)
Dept: FAMILY MEDICINE CLINIC | Facility: CLINIC | Age: 39
End: 2024-01-23
Payer: COMMERCIAL

## 2024-01-23 VITALS
SYSTOLIC BLOOD PRESSURE: 126 MMHG | DIASTOLIC BLOOD PRESSURE: 80 MMHG | TEMPERATURE: 97.5 F | RESPIRATION RATE: 16 BRPM | HEART RATE: 91 BPM | HEIGHT: 72 IN | OXYGEN SATURATION: 96 % | WEIGHT: 304.9 LBS | BODY MASS INDEX: 41.3 KG/M2

## 2024-01-23 DIAGNOSIS — E11.42 DIABETIC POLYNEUROPATHY ASSOCIATED WITH TYPE 2 DIABETES MELLITUS (HCC): ICD-10-CM

## 2024-01-23 DIAGNOSIS — F32.A ANXIETY AND DEPRESSION: ICD-10-CM

## 2024-01-23 DIAGNOSIS — F41.9 ANXIETY AND DEPRESSION: ICD-10-CM

## 2024-01-23 PROCEDURE — 99214 OFFICE O/P EST MOD 30 MIN: CPT | Performed by: NURSE PRACTITIONER

## 2024-01-23 PROCEDURE — 3074F SYST BP LT 130 MM HG: CPT | Performed by: NURSE PRACTITIONER

## 2024-01-23 PROCEDURE — 3079F DIAST BP 80-89 MM HG: CPT | Performed by: NURSE PRACTITIONER

## 2024-01-23 RX ORDER — ATORVASTATIN CALCIUM 80 MG/1
80 TABLET, FILM COATED ORAL DAILY
Qty: 90 TABLET | Refills: 1 | Status: SHIPPED | OUTPATIENT
Start: 2024-01-23

## 2024-01-23 RX ORDER — INSULIN GLARGINE-YFGN 100 [IU]/ML
20 INJECTION, SOLUTION SUBCUTANEOUS NIGHTLY
Qty: 10 ML | Refills: 1 | Status: SHIPPED | OUTPATIENT
Start: 2024-01-23

## 2024-01-23 RX ORDER — INSULIN LISPRO 100 [IU]/ML
INJECTION, SOLUTION INTRAVENOUS; SUBCUTANEOUS
Qty: 20 ADJUSTABLE DOSE PRE-FILLED PEN SYRINGE | Refills: 2 | Status: SHIPPED | OUTPATIENT
Start: 2024-01-23

## 2024-01-23 RX ORDER — DULOXETIN HYDROCHLORIDE 30 MG/1
30 CAPSULE, DELAYED RELEASE ORAL DAILY
Qty: 90 CAPSULE | Refills: 1 | Status: CANCELLED | OUTPATIENT
Start: 2024-01-23

## 2024-01-23 SDOH — ECONOMIC STABILITY: INCOME INSECURITY: HOW HARD IS IT FOR YOU TO PAY FOR THE VERY BASICS LIKE FOOD, HOUSING, MEDICAL CARE, AND HEATING?: NOT HARD AT ALL

## 2024-01-23 SDOH — ECONOMIC STABILITY: FOOD INSECURITY: WITHIN THE PAST 12 MONTHS, YOU WORRIED THAT YOUR FOOD WOULD RUN OUT BEFORE YOU GOT MONEY TO BUY MORE.: NEVER TRUE

## 2024-01-23 SDOH — ECONOMIC STABILITY: FOOD INSECURITY: WITHIN THE PAST 12 MONTHS, THE FOOD YOU BOUGHT JUST DIDN'T LAST AND YOU DIDN'T HAVE MONEY TO GET MORE.: NEVER TRUE

## 2024-01-23 ASSESSMENT — PATIENT HEALTH QUESTIONNAIRE - PHQ9
SUM OF ALL RESPONSES TO PHQ QUESTIONS 1-9: 0
2. FEELING DOWN, DEPRESSED OR HOPELESS: 0
SUM OF ALL RESPONSES TO PHQ9 QUESTIONS 1 & 2: 0
SUM OF ALL RESPONSES TO PHQ QUESTIONS 1-9: 0
1. LITTLE INTEREST OR PLEASURE IN DOING THINGS: 0

## 2024-01-23 NOTE — PROGRESS NOTES
regular rhythm, normal S1, S2, no murmurs, rubs, clicks or gallops  Neurological - alert, oriented, normal speech, no focal findings or movement disorder noted  Extremities - peripheral pulses normal, no pedal edema, no clubbing or cyanosis    Assessment/Plan:   Diagnosis Orders   1. BMI 40.0-44.9, adult (HCC)  [facility] - Idalmis Macedo MD, Bariatric Surgery, [facility]      2. Diabetic polyneuropathy associated with type 2 diabetes mellitus (HCC)  insulin lispro, 1 Unit Dial, (HUMALOG KWIKPEN) 100 UNIT/ML SOPN    insulin glargine-yfgn (SEMGLEE, YFGN,) 100 UNIT/ML injection vial    atorvastatin (LIPITOR) 80 MG tablet    CBC with Auto Differential    Comprehensive Metabolic Panel    Hemoglobin A1C    Lipid Panel      3. Anxiety and depression          Checking blood work. Getting back on treatment for diabetes ASAP. Start using dexcom. Follow up with podiatry.     Patient instructed on diabetes treatment-keep your blood sugar levels within your goal range and treat other medical conditions that go along with diabetes (like high blood pressure). Patient instructed on importance of blood sugar control to prevent long-term complications that can result from poorly controlled blood sugar (including problems affecting the eyes, kidney, nervous system, and cardiovascular system). Encouraged to do home blood sugar testing. Encouraged to keep A1C below 7. Encouraged to work on diet and exercise. Limit consumption of sugary beverages such as soft drinks or juice (even natural juice) or stop drinking them completely.   High cholesterol can significantly increase your risk of developing chest pain, heart attack, and stroke. Cholesterol can be lowered with lifestyle changes and certain medications. Recommend to reduce total and saturated fat in diet, lose weight, participate in aerobic exercise, and eat plenty of fruits and vegetables.       Referral placed to Dr. Smith for possible surgery.       Anticipatory

## 2024-01-30 ENCOUNTER — NURSE ONLY (OUTPATIENT)
Dept: FAMILY MEDICINE CLINIC | Facility: CLINIC | Age: 39
End: 2024-01-30

## 2024-01-30 DIAGNOSIS — E11.42 DIABETIC POLYNEUROPATHY ASSOCIATED WITH TYPE 2 DIABETES MELLITUS (HCC): ICD-10-CM

## 2024-01-30 LAB
ALBUMIN SERPL-MCNC: 3 G/DL (ref 3.5–5)
ALBUMIN/GLOB SERPL: 0.7 (ref 0.4–1.6)
ALP SERPL-CCNC: 135 U/L (ref 50–136)
ALT SERPL-CCNC: 40 U/L (ref 12–65)
ANION GAP SERPL CALC-SCNC: 5 MMOL/L (ref 2–11)
AST SERPL-CCNC: 23 U/L (ref 15–37)
BASOPHILS # BLD: 0 K/UL (ref 0–0.2)
BASOPHILS NFR BLD: 1 % (ref 0–2)
BILIRUB SERPL-MCNC: 0.4 MG/DL (ref 0.2–1.1)
BUN SERPL-MCNC: 14 MG/DL (ref 6–23)
CALCIUM SERPL-MCNC: 9.3 MG/DL (ref 8.3–10.4)
CHLORIDE SERPL-SCNC: 106 MMOL/L (ref 103–113)
CHOLEST SERPL-MCNC: 165 MG/DL
CO2 SERPL-SCNC: 25 MMOL/L (ref 21–32)
CREAT SERPL-MCNC: 1 MG/DL (ref 0.8–1.5)
DIFFERENTIAL METHOD BLD: ABNORMAL
EOSINOPHIL # BLD: 0.1 K/UL (ref 0–0.8)
EOSINOPHIL NFR BLD: 2 % (ref 0.5–7.8)
ERYTHROCYTE [DISTWIDTH] IN BLOOD BY AUTOMATED COUNT: 14.6 % (ref 11.9–14.6)
GLOBULIN SER CALC-MCNC: 4.2 G/DL (ref 2.8–4.5)
GLUCOSE SERPL-MCNC: 246 MG/DL (ref 65–100)
HCT VFR BLD AUTO: 40.1 % (ref 41.1–50.3)
HDLC SERPL-MCNC: 35 MG/DL (ref 40–60)
HDLC SERPL: 4.7
HGB BLD-MCNC: 12.8 G/DL (ref 13.6–17.2)
IMM GRANULOCYTES # BLD AUTO: 0 K/UL (ref 0–0.5)
IMM GRANULOCYTES NFR BLD AUTO: 0 % (ref 0–5)
LDLC SERPL CALC-MCNC: 105 MG/DL
LYMPHOCYTES # BLD: 1.1 K/UL (ref 0.5–4.6)
LYMPHOCYTES NFR BLD: 16 % (ref 13–44)
MCH RBC QN AUTO: 25.9 PG (ref 26.1–32.9)
MCHC RBC AUTO-ENTMCNC: 31.9 G/DL (ref 31.4–35)
MCV RBC AUTO: 81.2 FL (ref 82–102)
MONOCYTES # BLD: 0.4 K/UL (ref 0.1–1.3)
MONOCYTES NFR BLD: 6 % (ref 4–12)
NEUTS SEG # BLD: 5.2 K/UL (ref 1.7–8.2)
NEUTS SEG NFR BLD: 75 % (ref 43–78)
NRBC # BLD: 0 K/UL (ref 0–0.2)
PLATELET # BLD AUTO: 268 K/UL (ref 150–450)
PMV BLD AUTO: 10.7 FL (ref 9.4–12.3)
POTASSIUM SERPL-SCNC: 4.3 MMOL/L (ref 3.5–5.1)
PROT SERPL-MCNC: 7.2 G/DL (ref 6.3–8.2)
RBC # BLD AUTO: 4.94 M/UL (ref 4.23–5.6)
SODIUM SERPL-SCNC: 136 MMOL/L (ref 136–146)
TRIGL SERPL-MCNC: 125 MG/DL (ref 35–150)
VLDLC SERPL CALC-MCNC: 25 MG/DL (ref 6–23)
WBC # BLD AUTO: 6.9 K/UL (ref 4.3–11.1)

## 2024-01-31 LAB
EST. AVERAGE GLUCOSE BLD GHB EST-MCNC: 278 MG/DL
HBA1C MFR BLD: 11.3 % (ref 4.8–5.6)

## 2024-02-07 ENCOUNTER — TELEPHONE (OUTPATIENT)
Dept: FAMILY MEDICINE CLINIC | Facility: CLINIC | Age: 39
End: 2024-02-07

## 2024-02-07 DIAGNOSIS — E11.42 DIABETIC POLYNEUROPATHY ASSOCIATED WITH TYPE 2 DIABETES MELLITUS (HCC): ICD-10-CM

## 2024-02-07 RX ORDER — INSULIN GLARGINE-YFGN 100 [IU]/ML
40 INJECTION, SOLUTION SUBCUTANEOUS NIGHTLY
Qty: 10 ML | Refills: 1 | Status: SHIPPED | OUTPATIENT
Start: 2024-02-07

## 2024-02-07 NOTE — TELEPHONE ENCOUNTER
Patient stated he has ran out of his insulin glargine-yfgn (SEMGLEE, YFGN,) 100 UNIT/ML injection vial medication and needs a refill due to him having to take a 40 unit dosage rather than his 20 because if not his blood sugar isn't good in the morning. The pharmacy advised him to get a new 40 unit dosage prescribed so his insurance can cover it and for him to get his insulin by tomorrow due to him not having anymore. Stated he will be leaving out of town tomorrow and needs his insulin before than. Patient stated as well that he was supposed to go over some lab results with ida but I let him know ida was gone for the day.

## 2024-03-18 ENCOUNTER — TELEMEDICINE (OUTPATIENT)
Dept: FAMILY MEDICINE CLINIC | Facility: CLINIC | Age: 39
End: 2024-03-18
Payer: COMMERCIAL

## 2024-03-18 DIAGNOSIS — E78.00 HYPERCHOLESTEROLEMIA: Primary | ICD-10-CM

## 2024-03-18 DIAGNOSIS — B96.89 ACUTE BACTERIAL SINUSITIS: ICD-10-CM

## 2024-03-18 DIAGNOSIS — F32.9 REACTIVE DEPRESSION: ICD-10-CM

## 2024-03-18 DIAGNOSIS — J01.90 ACUTE BACTERIAL SINUSITIS: ICD-10-CM

## 2024-03-18 DIAGNOSIS — E11.42 DIABETIC POLYNEUROPATHY ASSOCIATED WITH TYPE 2 DIABETES MELLITUS (HCC): ICD-10-CM

## 2024-03-18 PROCEDURE — 3046F HEMOGLOBIN A1C LEVEL >9.0%: CPT | Performed by: NURSE PRACTITIONER

## 2024-03-18 PROCEDURE — 99214 OFFICE O/P EST MOD 30 MIN: CPT | Performed by: NURSE PRACTITIONER

## 2024-03-18 RX ORDER — TIRZEPATIDE 2.5 MG/.5ML
2.5 INJECTION, SOLUTION SUBCUTANEOUS WEEKLY
Qty: 6 ML | Refills: 3 | Status: SHIPPED | OUTPATIENT
Start: 2024-03-18

## 2024-03-18 RX ORDER — INSULIN GLARGINE-YFGN 100 [IU]/ML
40 INJECTION, SOLUTION SUBCUTANEOUS NIGHTLY
Qty: 3 EACH | Refills: 3 | Status: SHIPPED | OUTPATIENT
Start: 2024-03-18 | End: 2024-09-14

## 2024-03-18 RX ORDER — AZITHROMYCIN 250 MG/1
TABLET, FILM COATED ORAL
Qty: 6 TABLET | Refills: 0 | Status: SHIPPED | OUTPATIENT
Start: 2024-03-18

## 2024-03-18 RX ORDER — BUPROPION HYDROCHLORIDE 150 MG/1
150 TABLET ORAL EVERY MORNING
Qty: 30 TABLET | Refills: 11 | Status: SHIPPED | OUTPATIENT
Start: 2024-03-18

## 2024-03-18 ASSESSMENT — PATIENT HEALTH QUESTIONNAIRE - PHQ9
SUM OF ALL RESPONSES TO PHQ9 QUESTIONS 1 & 2: 3
9. THOUGHTS THAT YOU WOULD BE BETTER OFF DEAD, OR OF HURTING YOURSELF: NOT AT ALL
10. IF YOU CHECKED OFF ANY PROBLEMS, HOW DIFFICULT HAVE THESE PROBLEMS MADE IT FOR YOU TO DO YOUR WORK, TAKE CARE OF THINGS AT HOME, OR GET ALONG WITH OTHER PEOPLE: VERY DIFFICULT
SUM OF ALL RESPONSES TO PHQ QUESTIONS 1-9: 15
4. FEELING TIRED OR HAVING LITTLE ENERGY: NEARLY EVERY DAY
SUM OF ALL RESPONSES TO PHQ QUESTIONS 1-9: 15
SUM OF ALL RESPONSES TO PHQ QUESTIONS 1-9: 15
2. FEELING DOWN, DEPRESSED OR HOPELESS: NEARLY EVERY DAY
6. FEELING BAD ABOUT YOURSELF - OR THAT YOU ARE A FAILURE OR HAVE LET YOURSELF OR YOUR FAMILY DOWN: NEARLY EVERY DAY
8. MOVING OR SPEAKING SO SLOWLY THAT OTHER PEOPLE COULD HAVE NOTICED. OR THE OPPOSITE, BEING SO FIGETY OR RESTLESS THAT YOU HAVE BEEN MOVING AROUND A LOT MORE THAN USUAL: NOT AT ALL
3. TROUBLE FALLING OR STAYING ASLEEP: SEVERAL DAYS
SUM OF ALL RESPONSES TO PHQ QUESTIONS 1-9: 15
7. TROUBLE CONCENTRATING ON THINGS, SUCH AS READING THE NEWSPAPER OR WATCHING TELEVISION: NEARLY EVERY DAY
5. POOR APPETITE OR OVEREATING: MORE THAN HALF THE DAYS
1. LITTLE INTEREST OR PLEASURE IN DOING THINGS: NOT AT ALL

## 2024-03-18 NOTE — PROGRESS NOTES
Efrain Kumar, was evaluated through a synchronous (real-time) audio-video encounter. The patient (or guardian if applicable) is aware that this is a billable service, which includes applicable co-pays. This Virtual Visit was conducted with patient's (and/or legal guardian's) consent. Patient identification was verified, and a caregiver was present when appropriate.   The patient was located at Home: [address] [place] [id number]  Provider was located at Facility (Appt Dept): [address] [place] [phone]      Efrain Kumar (:  1985) is a Established patient, presenting virtually for evaluation of the following:    Assessment & Plan   Below is the assessment and plan developed based on review of pertinent history, physical exam, labs, studies, and medications.  1. Hypercholesterolemia  -     Lipid Panel; Future  2. Diabetic polyneuropathy associated with type 2 diabetes mellitus (HCC)  -     insulin glargine-yfgn (SEMGLEE, YFGN,) 100 UNIT/ML injection vial; Inject 40 Units into the skin at bedtime, Disp-3 each, R-3Normal  -     CBC with Auto Differential; Future  -     Comprehensive Metabolic Panel; Future  -     Hemoglobin A1C; Future  -     MOUNJARO 2.5 MG/0.5ML SOPN SC injection; Inject 0.5 mLs into the skin once a week E11.65, Disp-6 mL, R-3, DAWNormal  3. Acute bacterial sinusitis  -     azithromycin (ZITHROMAX) 250 MG tablet; Take 2 Tablets with food by mouth first day, then 1 tab with food by mouth daily for days 2 through 5., Disp-6 tablet, R-0Normal  4. Reactive depression  -     buPROPion (WELLBUTRIN XL) 150 MG extended release tablet; Take 1 tablet by mouth every morning, Disp-30 tablet, R-11Normal    Ordering blood work. Continue same medications except adding mounjaro to see if he will tolerate to help with diabetes. Added wellbutrin to help with depression. Start antibiotic for sinus symptoms- continue zyrtec and flonase- if symptoms do not improve to come

## 2024-05-20 ENCOUNTER — TELEPHONE (OUTPATIENT)
Dept: FAMILY MEDICINE CLINIC | Facility: CLINIC | Age: 39
End: 2024-05-20

## 2024-05-20 NOTE — TELEPHONE ENCOUNTER
Karishma dOen called patient and left voicemail letting patient know he had to have in person visit and blood work if he would still like to be a patient at [facility]

## 2024-05-20 NOTE — TELEPHONE ENCOUNTER
Spoke to pt trying to schedule and appointment He says his A1C is going to be the same because he can't afford the insulin you put him on. He says he can do  virtual appt. But not in person he can't afford to take off work. If this ok or does he need in person

## 2024-05-29 ENCOUNTER — HOSPITAL ENCOUNTER (EMERGENCY)
Age: 39
Discharge: HOME | End: 2024-05-29
Attending: GENERAL PRACTICE
Payer: COMMERCIAL

## 2024-05-29 ENCOUNTER — APPOINTMENT (OUTPATIENT)
Dept: GENERAL RADIOLOGY | Age: 39
End: 2024-05-29
Payer: COMMERCIAL

## 2024-05-29 VITALS
SYSTOLIC BLOOD PRESSURE: 147 MMHG | HEIGHT: 72 IN | DIASTOLIC BLOOD PRESSURE: 80 MMHG | RESPIRATION RATE: 10 BRPM | OXYGEN SATURATION: 97 % | BODY MASS INDEX: 41.31 KG/M2 | WEIGHT: 305 LBS | HEART RATE: 78 BPM | TEMPERATURE: 98.3 F

## 2024-05-29 DIAGNOSIS — R11.2 NAUSEA AND VOMITING, UNSPECIFIED VOMITING TYPE: ICD-10-CM

## 2024-05-29 DIAGNOSIS — R07.89 CHEST WALL PAIN: Primary | ICD-10-CM

## 2024-05-29 LAB
ALBUMIN SERPL-MCNC: 3 G/DL (ref 3.5–5)
ALBUMIN/GLOB SERPL: 0.8 (ref 1–1.9)
ALP SERPL-CCNC: 124 U/L (ref 40–129)
ALT SERPL-CCNC: 29 U/L (ref 12–65)
ANION GAP SERPL CALC-SCNC: 12 MMOL/L (ref 9–18)
AST SERPL-CCNC: 24 U/L (ref 15–37)
BASOPHILS # BLD: 0 K/UL (ref 0–0.2)
BASOPHILS NFR BLD: 0 % (ref 0–2)
BILIRUB SERPL-MCNC: 0.2 MG/DL (ref 0–1.2)
BUN SERPL-MCNC: 19 MG/DL (ref 6–23)
CALCIUM SERPL-MCNC: 8.9 MG/DL (ref 8.8–10.2)
CHLORIDE SERPL-SCNC: 102 MMOL/L (ref 98–107)
CO2 SERPL-SCNC: 23 MMOL/L (ref 20–28)
CREAT SERPL-MCNC: 0.94 MG/DL (ref 0.8–1.3)
DIFFERENTIAL METHOD BLD: ABNORMAL
EKG ATRIAL RATE: 80 BPM
EKG DIAGNOSIS: NORMAL
EKG P AXIS: 54 DEGREES
EKG P-R INTERVAL: 162 MS
EKG Q-T INTERVAL: 349 MS
EKG QRS DURATION: 99 MS
EKG QTC CALCULATION (BAZETT): 403 MS
EKG R AXIS: 21 DEGREES
EKG T AXIS: 29 DEGREES
EKG VENTRICULAR RATE: 80 BPM
EOSINOPHIL # BLD: 0.2 K/UL (ref 0–0.8)
EOSINOPHIL NFR BLD: 3 % (ref 0.5–7.8)
ERYTHROCYTE [DISTWIDTH] IN BLOOD BY AUTOMATED COUNT: 14.5 % (ref 11.9–14.6)
GLOBULIN SER CALC-MCNC: 3.6 G/DL (ref 2.3–3.5)
GLUCOSE SERPL-MCNC: 171 MG/DL (ref 70–99)
HCT VFR BLD AUTO: 38.2 % (ref 41.1–50.3)
HGB BLD-MCNC: 12.5 G/DL (ref 13.6–17.2)
IMM GRANULOCYTES # BLD AUTO: 0.1 K/UL (ref 0–0.5)
IMM GRANULOCYTES NFR BLD AUTO: 1 % (ref 0–5)
LYMPHOCYTES # BLD: 1.5 K/UL (ref 0.5–4.6)
LYMPHOCYTES NFR BLD: 21 % (ref 13–44)
MCH RBC QN AUTO: 26 PG (ref 26.1–32.9)
MCHC RBC AUTO-ENTMCNC: 32.7 G/DL (ref 31.4–35)
MCV RBC AUTO: 79.4 FL (ref 82–102)
MONOCYTES # BLD: 0.4 K/UL (ref 0.1–1.3)
MONOCYTES NFR BLD: 6 % (ref 4–12)
NEUTS SEG # BLD: 5 K/UL (ref 1.7–8.2)
NEUTS SEG NFR BLD: 69 % (ref 43–78)
NRBC # BLD: 0 K/UL (ref 0–0.2)
PLATELET # BLD AUTO: 234 K/UL (ref 150–450)
PMV BLD AUTO: 9.6 FL (ref 9.4–12.3)
POTASSIUM SERPL-SCNC: 4.3 MMOL/L (ref 3.5–5.1)
PROT SERPL-MCNC: 6.6 G/DL (ref 6.3–8.2)
RBC # BLD AUTO: 4.81 M/UL (ref 4.23–5.6)
SODIUM SERPL-SCNC: 137 MMOL/L (ref 136–145)
TROPONIN T SERPL HS-MCNC: 11 NG/L (ref 0–22)
TROPONIN T SERPL HS-MCNC: 13 NG/L (ref 0–22)
WBC # BLD AUTO: 7.2 K/UL (ref 4.3–11.1)

## 2024-05-29 PROCEDURE — 96374 THER/PROPH/DIAG INJ IV PUSH: CPT

## 2024-05-29 PROCEDURE — 6360000002 HC RX W HCPCS: Performed by: GENERAL PRACTICE

## 2024-05-29 PROCEDURE — 85025 COMPLETE CBC W/AUTO DIFF WBC: CPT

## 2024-05-29 PROCEDURE — 99285 EMERGENCY DEPT VISIT HI MDM: CPT

## 2024-05-29 PROCEDURE — 96375 TX/PRO/DX INJ NEW DRUG ADDON: CPT

## 2024-05-29 PROCEDURE — 93010 ELECTROCARDIOGRAM REPORT: CPT | Performed by: INTERNAL MEDICINE

## 2024-05-29 PROCEDURE — 71045 X-RAY EXAM CHEST 1 VIEW: CPT

## 2024-05-29 PROCEDURE — 80053 COMPREHEN METABOLIC PANEL: CPT

## 2024-05-29 PROCEDURE — 84484 ASSAY OF TROPONIN QUANT: CPT

## 2024-05-29 PROCEDURE — 93005 ELECTROCARDIOGRAM TRACING: CPT | Performed by: GENERAL PRACTICE

## 2024-05-29 RX ORDER — MORPHINE SULFATE 4 MG/ML
4 INJECTION, SOLUTION INTRAMUSCULAR; INTRAVENOUS
Status: COMPLETED | OUTPATIENT
Start: 2024-05-29 | End: 2024-05-29

## 2024-05-29 RX ORDER — ONDANSETRON 2 MG/ML
4 INJECTION INTRAMUSCULAR; INTRAVENOUS ONCE
Status: COMPLETED | OUTPATIENT
Start: 2024-05-29 | End: 2024-05-29

## 2024-05-29 RX ADMIN — MORPHINE SULFATE 4 MG: 4 INJECTION INTRAVENOUS at 13:20

## 2024-05-29 RX ADMIN — ONDANSETRON 4 MG: 2 INJECTION INTRAMUSCULAR; INTRAVENOUS at 13:20

## 2024-05-29 ASSESSMENT — PAIN - FUNCTIONAL ASSESSMENT: PAIN_FUNCTIONAL_ASSESSMENT: 0-10

## 2024-05-29 ASSESSMENT — PAIN SCALES - GENERAL
PAINLEVEL_OUTOF10: 4
PAINLEVEL_OUTOF10: 4

## 2024-05-29 ASSESSMENT — PAIN DESCRIPTION - DESCRIPTORS
DESCRIPTORS: ACHING
DESCRIPTORS: SQUEEZING;TIGHTNESS

## 2024-05-29 ASSESSMENT — PAIN DESCRIPTION - LOCATION
LOCATION: CHEST;ARM
LOCATION: CHEST;ARM

## 2024-05-29 ASSESSMENT — PAIN DESCRIPTION - ORIENTATION
ORIENTATION: LEFT
ORIENTATION: LEFT

## 2024-05-29 NOTE — ED NOTES
Patient mobility status  with no difficulty. Provider aware     I have reviewed discharge instructions with the patient.  The patient verbalized understanding.    Patient left ED via Discharge Method: ambulatory to Home with  self .    Opportunity for questions and clarification provided.     Patient given 0 scripts.            Coleman Berg, RN  05/29/24 [id number]

## 2024-05-29 NOTE — ED PROVIDER NOTES
Food in the Last Year: Never true   Transportation Needs: Unknown (1/23/2024)    PRAPARE - Transportation     Lack of Transportation (Non-Medical): No   Housing Stability: Unknown (1/23/2024)    Housing Stability Vital Sign     Unstable Housing in the Last Year: No        Previous Medications    ATORVASTATIN (LIPITOR) 80 MG TABLET    Take 1 tablet by mouth daily    AZITHROMYCIN (ZITHROMAX) 250 MG TABLET    Take 2 Tablets with food by mouth first day, then 1 tab with food by mouth daily for days 2 through 5.    BUPROPION (WELLBUTRIN XL) 150 MG EXTENDED RELEASE TABLET    Take 1 tablet by mouth every morning    CONTINUOUS BLOOD GLUC SENSOR (DEXCOM G6 SENSOR) MISC    Change sensor every 10-14 days.k blood glucose readings thru out the day.  E11.65.    CONTINUOUS BLOOD GLUC TRANSMIT (DEXCOM G6 TRANSMITTER) MISC    Use to monitor blood glucose, E11.65    INSULIN GLARGINE-YFGN (SEMGLEE, YFGN,) 100 UNIT/ML INJECTION VIAL    Inject 40 Units into the skin at bedtime    INSULIN LISPRO, 1 UNIT DIAL, (HUMALOG KWIKPEN) 100 UNIT/ML SOPN    Take 35 U SC 3 times daily before meals    MOUNJARO 2.5 MG/0.5ML SOPN SC INJECTION    Inject 0.5 mLs into the skin once a week E11.65    SILVER SULFADIAZINE (SILVADENE) 1 % CREAM    APPLY TOPICALLY EVERY DAY FOR 28 DAYS        Results for orders placed or performed during the hospital encounter of 05/29/24   XR CHEST PORTABLE    Narrative    Chest X-ray    INDICATION: Chest pain.    COMPARISON:  None    TECHNIQUE: AP/PA view of the chest was obtained.    FINDINGS: The lungs are hypoaerated accentuating vascular markings. Right  midlung linear atelectasis versus scar. No consolidation..  The heart size is  normal.  The bony thorax is intact.        Impression    No acute findings in the chest     CBC with Auto Differential   Result Value Ref Range    WBC 7.2 4.3 - 11.1 K/uL    RBC 4.81 4.23 - 5.6 M/uL    Hemoglobin 12.5 (L) 13.6 - 17.2 g/dL    Hematocrit 38.2 (L) 41.1 - 50.3 %    MCV 79.4 (L) 82

## 2024-05-29 NOTE — ED TRIAGE NOTES
Pt arrives from home via ems w/ cc of chest tightness, nausea, vomiting,  pain and tingling running down left arm that started at 11 AM this morning while in a video meeting. Hx stroke 6 years ago, DM2. 324 aspirin and 1 nitro given en route which improved symptoms. VSS. Pt A&O x 4

## 2024-05-30 ENCOUNTER — INITIAL CONSULT (OUTPATIENT)
Age: 39
End: 2024-05-30
Payer: COMMERCIAL

## 2024-05-30 VITALS
HEIGHT: 72 IN | HEART RATE: 79 BPM | BODY MASS INDEX: 42.66 KG/M2 | DIASTOLIC BLOOD PRESSURE: 86 MMHG | WEIGHT: 315 LBS | SYSTOLIC BLOOD PRESSURE: 128 MMHG

## 2024-05-30 DIAGNOSIS — R06.09 DYSPNEA ON EXERTION: ICD-10-CM

## 2024-05-30 DIAGNOSIS — Z09 HOSPITAL DISCHARGE FOLLOW-UP: ICD-10-CM

## 2024-05-30 DIAGNOSIS — R07.89 OTHER CHEST PAIN: Primary | ICD-10-CM

## 2024-05-30 DIAGNOSIS — I20.1 PRINZMETAL ANGINA (HCC): Chronic | ICD-10-CM

## 2024-05-30 PROCEDURE — 1111F DSCHRG MED/CURRENT MED MERGE: CPT | Performed by: INTERNAL MEDICINE

## 2024-05-30 PROCEDURE — 3079F DIAST BP 80-89 MM HG: CPT | Performed by: INTERNAL MEDICINE

## 2024-05-30 PROCEDURE — 99244 OFF/OP CNSLTJ NEW/EST MOD 40: CPT | Performed by: INTERNAL MEDICINE

## 2024-05-30 PROCEDURE — 3074F SYST BP LT 130 MM HG: CPT | Performed by: INTERNAL MEDICINE

## 2024-05-30 RX ORDER — ASPIRIN 81 MG/1
81 TABLET, CHEWABLE ORAL DAILY
COMMUNITY
Start: 2024-05-28 | End: 2025-05-28

## 2024-05-30 RX ORDER — NITROGLYCERIN 0.4 MG/1
0.4 TABLET SUBLINGUAL EVERY 5 MIN PRN
Qty: 25 TABLET | Refills: 3 | Status: SHIPPED | OUTPATIENT
Start: 2024-05-30

## 2024-05-30 RX ORDER — AMLODIPINE BESYLATE 5 MG/1
5 TABLET ORAL DAILY
Qty: 90 TABLET | Refills: 3 | Status: SHIPPED | OUTPATIENT
Start: 2024-05-30

## 2024-05-30 RX ORDER — GLIPIZIDE 10 MG/1
10 TABLET ORAL DAILY
COMMUNITY
Start: 2024-05-28 | End: 2024-11-24

## 2024-05-30 NOTE — PROGRESS NOTES
[address] [facility], [place] [address]  PHONE: [phone]    SUBJECTIVE:   Efrain Kumar is a 38 y.o. male 1985   seen for a consultation visit regarding the following:     Chief Complaint   Patient presents with    Consultation     Chest wall pain    Follow-Up from Hospital              Consultation is requested for evaluation of Consultation (Chest wall pain) and Follow-Up from Hospital   .    History of present illness: 38 y.o. male with PMH HTN, HLD, T2DM, prinzmetal angina presenting for evaluation of chest pain. Patient went to the ED for this on 5/29/24 (yesterday) with an unremarkable workup at the time. He reports pain on the left side of his chest described as a pressure that radiates around to the left side of his back with associated numbness in his left arm. This pain has been occurring randomly unrelated to exertion. Also reports MORENO.    Reviewed ED note    Past Medical History, Past Surgical History, Family history, Social History, and Medications were all reviewed with the patient today and updated as necessary.       Allergies   Allergen Reactions    Promethazine Anaphylaxis     Makes me stop breathing\"    Ozempic (0.25 Or 0.5 Mg-Dose) [Semaglutide(0.25 Or 0.5mg-Dos)]     Metformin Diarrhea     Past Medical History:   Diagnosis Date    Adhesive capsulitis of left shoulder 06/01/2023    Amputated toe (HCC)     right great toe    Arrhythmia     \" artery in heart goes into spams\"  no meds    Coronary artery disease involving native coronary artery with angina pectoris with documented spasm (HCC)     Deep venous thrombosis (HCC) 02/22/2023    Depression with anxiety 04/11/2017    Essential hypertension 09/02/2018    Gastroesophageal reflux disease without esophagitis 04/26/2023    History of kidney stones     multiple with surgical intervention X1    History of TIA (transient ischemic attack)     no deficits    Hx of blood clots     history of Right DVT    Hypercholesterolemia

## 2024-07-31 NOTE — ED NOTES
TRANSFER - OUT REPORT:    Verbal report given to [facility], RN on Estella Verdin  being transferred to [facility] [phone] for routine progression of patient care       Report consisted of patient's Situation, Background, Assessment and   Recommendations(SBAR). Information from the following report(s) Nurse Handoff Report, ED Encounter Summary, ED SBAR, Adult Overview, MAR, Recent Results, Med Rec Status, Cardiac Rhythm NSR, and Neuro Assessment was reviewed with the receiving nurse. Lines:   Peripheral IV 07/10/23 Left Antecubital (Active)   Site Assessment Clean, dry & intact 07/10/23 0730        Opportunity for questions and clarification was provided.       Patient transported with:  Monitor      Troy Way RN  07/10/23 3803 Detail Level: Zone

## 2024-08-26 ENCOUNTER — HOSPITAL ENCOUNTER (EMERGENCY)
Age: 39
Discharge: HOME | End: 2024-08-26
Payer: COMMERCIAL

## 2024-08-26 ENCOUNTER — APPOINTMENT (OUTPATIENT)
Dept: CT IMAGING | Age: 39
End: 2024-08-26
Payer: COMMERCIAL

## 2024-08-26 ENCOUNTER — HOSPITAL ENCOUNTER (EMERGENCY)
Age: 39
Discharge: ANOTHER HOSPITAL | End: 2024-08-26
Attending: EMERGENCY MEDICINE
Payer: COMMERCIAL

## 2024-08-26 ENCOUNTER — ANESTHESIA EVENT (OUTPATIENT)
Dept: ENDOSCOPY | Age: 39
End: 2024-08-26
Payer: COMMERCIAL

## 2024-08-26 ENCOUNTER — ANESTHESIA (OUTPATIENT)
Dept: ENDOSCOPY | Age: 39
End: 2024-08-26
Payer: COMMERCIAL

## 2024-08-26 ENCOUNTER — HOSPITAL ENCOUNTER (OUTPATIENT)
Age: 39
Setting detail: SURGERY ADMIT
End: 2024-08-26
Attending: INTERNAL MEDICINE | Admitting: INTERNAL MEDICINE
Payer: COMMERCIAL

## 2024-08-26 VITALS
TEMPERATURE: 98.7 F | DIASTOLIC BLOOD PRESSURE: 102 MMHG | HEART RATE: 82 BPM | HEIGHT: 72 IN | BODY MASS INDEX: 42.66 KG/M2 | SYSTOLIC BLOOD PRESSURE: 144 MMHG | OXYGEN SATURATION: 94 % | RESPIRATION RATE: 18 BRPM | WEIGHT: 315 LBS

## 2024-08-26 VITALS
TEMPERATURE: 97.9 F | HEART RATE: 82 BPM | BODY MASS INDEX: 42.66 KG/M2 | DIASTOLIC BLOOD PRESSURE: 69 MMHG | SYSTOLIC BLOOD PRESSURE: 144 MMHG | RESPIRATION RATE: 16 BRPM | HEIGHT: 72 IN | WEIGHT: 315 LBS | OXYGEN SATURATION: 92 %
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
## 2024-08-26 DIAGNOSIS — T18.128A ESOPHAGEAL OBSTRUCTION DUE TO FOOD IMPACTION: Primary | ICD-10-CM

## 2024-08-26 LAB
GLUCOSE BLD STRIP.AUTO-MCNC: 146 MG/DL (ref 65–100)
SERVICE CMNT-IMP: ABNORMAL

## 2024-08-26 PROCEDURE — 88305 TISSUE EXAM BY PATHOLOGIST: CPT

## 2024-08-26 PROCEDURE — 99285 EMERGENCY DEPT VISIT HI MDM: CPT

## 2024-08-26 PROCEDURE — 3609012400 HC EGD TRANSORAL BIOPSY SINGLE/MULTIPLE: Performed by: INTERNAL MEDICINE

## 2024-08-26 PROCEDURE — 3700000001 HC ADD 15 MINUTES (ANESTHESIA): Performed by: INTERNAL MEDICINE

## 2024-08-26 PROCEDURE — 82962 GLUCOSE BLOOD TEST: CPT

## 2024-08-26 PROCEDURE — 6360000002 HC RX W HCPCS: Performed by: EMERGENCY MEDICINE

## 2024-08-26 PROCEDURE — 3700000000 HC ANESTHESIA ATTENDED CARE: Performed by: INTERNAL MEDICINE

## 2024-08-26 PROCEDURE — 71250 CT THORAX DX C-: CPT

## 2024-08-26 PROCEDURE — 6360000002 HC RX W HCPCS: Performed by: ANESTHESIOLOGY

## 2024-08-26 PROCEDURE — 96374 THER/PROPH/DIAG INJ IV PUSH: CPT

## 2024-08-26 PROCEDURE — 7100000010 HC PHASE II RECOVERY - FIRST 15 MIN: Performed by: INTERNAL MEDICINE

## 2024-08-26 PROCEDURE — 7100000000 HC PACU RECOVERY - FIRST 15 MIN: Performed by: INTERNAL MEDICINE

## 2024-08-26 PROCEDURE — 7100000011 HC PHASE II RECOVERY - ADDTL 15 MIN: Performed by: INTERNAL MEDICINE

## 2024-08-26 PROCEDURE — 2709999900 HC NON-CHARGEABLE SUPPLY: Performed by: INTERNAL MEDICINE

## 2024-08-26 PROCEDURE — 96375 TX/PRO/DX INJ NEW DRUG ADDON: CPT

## 2024-08-26 RX ORDER — FENTANYL CITRATE 50 UG/ML
100 INJECTION, SOLUTION INTRAMUSCULAR; INTRAVENOUS
Status: CANCELLED | OUTPATIENT
Start: 2024-08-26 | End: 2024-08-27

## 2024-08-26 RX ORDER — SODIUM CHLORIDE 0.9 % (FLUSH) 0.9 %
5-40 SYRINGE (ML) INJECTION EVERY 12 HOURS SCHEDULED
Status: CANCELLED | OUTPATIENT
Start: 2024-08-26

## 2024-08-26 RX ORDER — MIDAZOLAM HYDROCHLORIDE 2 MG/2ML
2 INJECTION, SOLUTION INTRAMUSCULAR; INTRAVENOUS
Status: CANCELLED | OUTPATIENT
Start: 2024-08-26 | End: 2024-08-27

## 2024-08-26 RX ORDER — FENTANYL CITRATE 50 UG/ML
25 INJECTION, SOLUTION INTRAMUSCULAR; INTRAVENOUS EVERY 5 MIN PRN
Status: DISCONTINUED | OUTPATIENT
Start: 2024-08-26 | End: 2024-08-27 | Stop reason: HOSPADM

## 2024-08-26 RX ORDER — OXYCODONE HYDROCHLORIDE 5 MG/1
5 TABLET ORAL
Status: CANCELLED | OUTPATIENT
Start: 2024-08-26 | End: 2024-08-27

## 2024-08-26 RX ORDER — HYDROMORPHONE HYDROCHLORIDE 2 MG/ML
0.5 INJECTION, SOLUTION INTRAMUSCULAR; INTRAVENOUS; SUBCUTANEOUS EVERY 10 MIN PRN
Status: DISCONTINUED | OUTPATIENT
Start: 2024-08-26 | End: 2024-08-27 | Stop reason: HOSPADM

## 2024-08-26 RX ORDER — DIPHENHYDRAMINE HYDROCHLORIDE 50 MG/ML
12.5 INJECTION INTRAMUSCULAR; INTRAVENOUS
Status: CANCELLED | OUTPATIENT
Start: 2024-08-26 | End: 2024-08-27

## 2024-08-26 RX ORDER — ONDANSETRON 2 MG/ML
4 INJECTION INTRAMUSCULAR; INTRAVENOUS ONCE
Status: COMPLETED | OUTPATIENT
Start: 2024-08-26 | End: 2024-08-26

## 2024-08-26 RX ORDER — METOCLOPRAMIDE HYDROCHLORIDE 5 MG/ML
10 INJECTION INTRAMUSCULAR; INTRAVENOUS
Status: DISCONTINUED | OUTPATIENT
Start: 2024-08-26 | End: 2024-08-27 | Stop reason: HOSPADM

## 2024-08-26 RX ORDER — DIPHENHYDRAMINE HYDROCHLORIDE 50 MG/ML
12.5 INJECTION INTRAMUSCULAR; INTRAVENOUS
Status: DISCONTINUED | OUTPATIENT
Start: 2024-08-26 | End: 2024-08-27 | Stop reason: HOSPADM

## 2024-08-26 RX ORDER — SODIUM CHLORIDE, SODIUM LACTATE, POTASSIUM CHLORIDE, CALCIUM CHLORIDE 600; 310; 30; 20 MG/100ML; MG/100ML; MG/100ML; MG/100ML
INJECTION, SOLUTION INTRAVENOUS CONTINUOUS
Status: CANCELLED | OUTPATIENT
Start: 2024-08-26

## 2024-08-26 RX ORDER — SUCCINYLCHOLINE CHLORIDE 20 MG/ML
INJECTION INTRAMUSCULAR; INTRAVENOUS PRN
Status: DISCONTINUED | OUTPATIENT
Start: 2024-08-26 | End: 2024-08-26 | Stop reason: SDUPTHER

## 2024-08-26 RX ORDER — OXYCODONE HYDROCHLORIDE 5 MG/1
5 TABLET ORAL
Status: DISCONTINUED | OUTPATIENT
Start: 2024-08-26 | End: 2024-08-27 | Stop reason: HOSPADM

## 2024-08-26 RX ORDER — MORPHINE SULFATE 4 MG/ML
4 INJECTION, SOLUTION INTRAMUSCULAR; INTRAVENOUS
Status: COMPLETED | OUTPATIENT
Start: 2024-08-26 | End: 2024-08-26

## 2024-08-26 RX ORDER — METOCLOPRAMIDE HYDROCHLORIDE 5 MG/ML
10 INJECTION INTRAMUSCULAR; INTRAVENOUS
Status: CANCELLED | OUTPATIENT
Start: 2024-08-26 | End: 2024-08-27

## 2024-08-26 RX ORDER — NALOXONE HYDROCHLORIDE 0.4 MG/ML
INJECTION, SOLUTION INTRAMUSCULAR; INTRAVENOUS; SUBCUTANEOUS PRN
Status: CANCELLED | OUTPATIENT
Start: 2024-08-26

## 2024-08-26 RX ORDER — IBUPROFEN 600 MG/1
1 TABLET ORAL ONCE
Status: COMPLETED | OUTPATIENT
Start: 2024-08-26 | End: 2024-08-26

## 2024-08-26 RX ORDER — PROPOFOL 10 MG/ML
INJECTION, EMULSION INTRAVENOUS PRN
Status: DISCONTINUED | OUTPATIENT
Start: 2024-08-26 | End: 2024-08-26 | Stop reason: SDUPTHER

## 2024-08-26 RX ORDER — NALOXONE HYDROCHLORIDE 0.4 MG/ML
INJECTION, SOLUTION INTRAMUSCULAR; INTRAVENOUS; SUBCUTANEOUS PRN
Status: DISCONTINUED | OUTPATIENT
Start: 2024-08-26 | End: 2024-08-27 | Stop reason: HOSPADM

## 2024-08-26 RX ORDER — SODIUM CHLORIDE, SODIUM LACTATE, POTASSIUM CHLORIDE, CALCIUM CHLORIDE 600; 310; 30; 20 MG/100ML; MG/100ML; MG/100ML; MG/100ML
INJECTION, SOLUTION INTRAVENOUS CONTINUOUS
Status: DISCONTINUED | OUTPATIENT
Start: 2024-08-26 | End: 2024-08-27 | Stop reason: HOSPADM

## 2024-08-26 RX ORDER — ONDANSETRON 2 MG/ML
4 INJECTION INTRAMUSCULAR; INTRAVENOUS
Status: DISCONTINUED | OUTPATIENT
Start: 2024-08-26 | End: 2024-08-27 | Stop reason: HOSPADM

## 2024-08-26 RX ORDER — SCOLOPAMINE TRANSDERMAL SYSTEM 1 MG/1
1 PATCH, EXTENDED RELEASE TRANSDERMAL
Status: CANCELLED | OUTPATIENT
Start: 2024-08-26 | End: 2024-08-29

## 2024-08-26 RX ORDER — FENTANYL CITRATE 50 UG/ML
25 INJECTION, SOLUTION INTRAMUSCULAR; INTRAVENOUS EVERY 5 MIN PRN
Status: CANCELLED | OUTPATIENT
Start: 2024-08-26

## 2024-08-26 RX ORDER — ONDANSETRON 2 MG/ML
4 INJECTION INTRAMUSCULAR; INTRAVENOUS
Status: CANCELLED | OUTPATIENT
Start: 2024-08-26 | End: 2024-08-27

## 2024-08-26 RX ORDER — PANTOPRAZOLE SODIUM 40 MG/1
40 TABLET, DELAYED RELEASE ORAL
Qty: 180 TABLET | Refills: 1 | Status: SHIPPED | OUTPATIENT
Start: 2024-08-26

## 2024-08-26 RX ORDER — HYDROMORPHONE HYDROCHLORIDE 2 MG/ML
0.5 INJECTION, SOLUTION INTRAMUSCULAR; INTRAVENOUS; SUBCUTANEOUS EVERY 10 MIN PRN
Status: CANCELLED | OUTPATIENT
Start: 2024-08-26

## 2024-08-26 RX ADMIN — GLUCAGON 1 MG: KIT at 16:16

## 2024-08-26 RX ADMIN — MORPHINE SULFATE 4 MG: 4 INJECTION, SOLUTION INTRAMUSCULAR; INTRAVENOUS at 20:27

## 2024-08-26 RX ADMIN — ONDANSETRON 4 MG: 2 INJECTION INTRAMUSCULAR; INTRAVENOUS at 16:17

## 2024-08-26 RX ADMIN — Medication 200 MG: at 21:53

## 2024-08-26 RX ADMIN — PROPOFOL 200 MG: 10 INJECTION, EMULSION INTRAVENOUS at 21:53

## 2024-08-26 ASSESSMENT — PAIN SCALES - GENERAL
PAINLEVEL_OUTOF10: 6
PAINLEVEL_OUTOF10: 7

## 2024-08-26 ASSESSMENT — PAIN - FUNCTIONAL ASSESSMENT
PAIN_FUNCTIONAL_ASSESSMENT: NONE - DENIES PAIN
PAIN_FUNCTIONAL_ASSESSMENT: NONE - DENIES PAIN
PAIN_FUNCTIONAL_ASSESSMENT: 0-10

## 2024-08-26 ASSESSMENT — PAIN DESCRIPTION - LOCATION: LOCATION: CHEST

## 2024-08-26 ASSESSMENT — LIFESTYLE VARIABLES
SMOKING_STATUS: 1
HOW OFTEN DO YOU HAVE A DRINK CONTAINING ALCOHOL: NEVER
HOW MANY STANDARD DRINKS CONTAINING ALCOHOL DO YOU HAVE ON A TYPICAL DAY: PATIENT DOES NOT DRINK

## 2024-08-26 ASSESSMENT — ENCOUNTER SYMPTOMS: VOMITING: 1

## 2024-08-26 ASSESSMENT — PAIN DESCRIPTION - ORIENTATION: ORIENTATION: UPPER

## 2024-08-26 NOTE — ED PROVIDER NOTES
Emergency Department Provider Note       PCP: Alexandria Gastelum, APRN - CNP   Age: 38 y.o.   Sex: male     DISPOSITION Decision To Transfer 08/26/2024 08:06:46 PM  Condition at Disposition: Good       ICD-10-CM    1. Esophageal obstruction due to food impaction  T18.128A     W44.F3XA           Medical Decision Making     Patient is being evaluated for possible food bolus impaction.  Patient will be given IV glucagon and Zofran.  Presentation is slightly abnormal and the fact that this seems to have started 3 days ago.  Will perform CT chest to look for any signs of obstruction.  ED Course as of 08/26/24 2006   Mon Aug 26, 2024   1859 CT imaging was performed did not show any esophageal dilation.  Questionable food particles seen distally but poorly visualized.  Patient was given glucagon and Zofran.  We attempted p.o. challenge but patient failed.  I will contact surgery. [MAHNAZ]   2004 I discussed the case with GI.  Patient will transfer via POV to [place] [facility]. [MAHNAZ]      ED Course User Index  [MAHNAZ] Portia Romero, DO     1 or more acute illnesses that pose a threat to life or bodily function.   Parental controlled substances given in the ED.  Discussion with external consultants.    I independently ordered and reviewed each unique test.                       History     Patient is a 38-year-old male with past medical history of diabetes.  He presents with complaint of abdominal pain with nausea and vomiting.  Symptoms started Friday after eating a breakfast burrito.  Patient endorses that he felt like the breeder became lodged in the lower portion of his chest.  He has continued had that discomfort since that time.  He has been having little appetite since then.  He has tried to eat some but has vomited each time he tries to eat.  He has attempted to use Zofran and Pepcid to relieve his symptoms with no change.    The history is provided by the patient.     Physical Exam     Vitals signs and

## 2024-08-26 NOTE — ED TRIAGE NOTES
Pt to ed with c/o epigastric \"fullness\" since Friday morning. Pt reports he ate breakfast and felt like his food never went completely down and sts that feeling is still present. Pt reports vomiting this morning several episodes. Pt also reports no BM in 2 days. Pt denies abd pain at this time.

## 2024-08-27 NOTE — ANESTHESIA POSTPROCEDURE EVALUATION
Department of Anesthesiology  Postprocedure Note    Patient: Efrain Kumar  MRN: [id number]  YOB: 1985  Date of evaluation: 8/26/2024    Procedure Summary       Date: 08/26/24 Room / Location: [facility] ENDO FLOURO 1 / [facility] ENDOSCOPY    Anesthesia Start: 2146 Anesthesia Stop: 2219    Procedure: ESOPHAGOGASTRODUODENOSCOPY BIOPSY (Upper GI Region) Diagnosis:       Food bolus obstruction of intestine (HCC)      (Food bolus obstruction of intestine (HCC) [K56.699, W44.F3XA])    Surgeons: Abraham Draper MD Responsible Provider: Rohan Rasheed MD    Anesthesia Type: General ASA Status: 3            Anesthesia Type: General    Tish Phase I: Tish Score: 9    Tish Phase II:      Anesthesia Post Evaluation    Patient location during evaluation: PACU  Patient participation: complete - patient participated  Level of consciousness: awake and awake and alert  Airway patency: patent  Nausea & Vomiting: no nausea  Cardiovascular status: hemodynamically stable  Respiratory status: acceptable  Hydration status: euvolemic  Multimodal analgesia pain management approach  Pain management: adequate    No notable events documented.

## 2024-08-27 NOTE — CONSULTS
GASTROENTEROLOGY & INTERVENTIONAL ENDOSCOPY        Consult Requested By: ER  Reason for Consult: Food impaction  Primary GI MD: n/AAdmission date: 2024        Date of service: 2024      Patient name: Efrain Kumar  : 1985  MRN: [id number]    Subjective     History of Present Illness  Efrain Kumar is a 38 y.o. male who presents to [facility] on 2024 for concerns of food impaction    He presented to the ER at [place] with c/o epigastric fullness. Received a PO trial in ER and failed. He then had glucagon but this did nto help either. No abdominal pain. No fevers/chills/nt sweats. No coughs/colds. No other complains        Patient denied any fever, chills, no chest pain, no SOB, no changes in urine color, no easy bruising, no neurological deficits.    Endoscopic studies:  Previous Colonoscopy: No  Previous EGD: No    GI Imaging studies:   -    Aspirin use: No  NSAID use: No  Plavix/Brillinta: No  Anticoagulation (warfarin or Eliquis or Xarelto or Pradaxa or any other): No      GI ROS:  Nausea:No  Vomiting:No  Heartburn/acid reflux:No  Abdominal pain: No  Chest pain: No  Dysphagia: No  Odynophagia: No  Diarrhea:No  Constipation:No  Incontinence of stool :No  Changes in bowel habit patterns: No  Vomiting blood or coffee ground :No  Black tarry stools/Melena: No,  Hematochezia/blood in stool:No  Jaundice: No  Ascites: No  Pruritis: No  Change in appetite: No  Unintentional weight loss: No  Early satiety: No      Review of Systems: A complete ROS was conducted - all pertinent positives are per the HPI, all others are negative.    Past Medical History:   Diagnosis Date    Adhesive capsulitis of left shoulder 2023    Amputated toe (HCC)     right great toe    Arrhythmia     \" artery in heart goes into spams\"  no meds    Coronary artery disease involving native coronary artery with angina pectoris with documented spasm (HCC)     Deep venous thrombosis (HCC) 2023     infusion, , IntraVENous, PRN, Rohan Rasheed MD, 200 mg at 08/26/24 2153    succinylcholine (ANECTINE) injection, , IntraVENous, PRN, Rohan Rasheed MD, 200 mg at 08/26/24 2153    Allergies   Allergen Reactions    Promethazine Anaphylaxis     Makes me stop breathing\"    Ozempic (0.25 Or 0.5 Mg-Dose) [Semaglutide(0.25 Or 0.5mg-Dos)]     Metformin Diarrhea       Family History   Problem Relation Age of Onset    Kidney Disease Mother     Diabetes Father     Kidney Disease Father         Objective     Objective  VITALS:    BP (!) 154/98   Pulse 78   Temp 98.3 °F (36.8 °C)   Resp 16   Ht 1.829 m (6')   Wt (!) 144.2 kg (318 lb)   SpO2 95%   BMI 43.13 kg/m²     No intake or output data in the 24 hours ending 08/26/24 2201      Physical Exam:   GeneralWell developed, well nourished, no acute distress  HEENTNormocephalic, sclera anicteric  HeartRegular rate, strong pulse  LungsNormal effort, equal rise  Abdomen Soft, NT, ND  SkinNonjaundiced, no rash  NeuroA/O x 4., no gross deficit  PsychNormal mood, affect         LABS:  @LABRCNTIP(NA,K,CL,co2,bun,creatinine,labglom,glucose,calcium,magnesium,phosphorus,aniongap)@  Lab Results   Component Value Date    WBC 7.2 05/29/2024    HGB 12.5 (L) 05/29/2024    HCT 38.2 (L) 05/29/2024    MCV 79.4 (L) 05/29/2024     05/29/2024     Lab Results   Component Value Date    GLUCOSE 171 (H) 05/29/2024    CALCIUM 8.9 05/29/2024     05/29/2024    K 4.3 05/29/2024    CO2 23 05/29/2024     05/29/2024    BUN 19 05/29/2024    CREATININE 0.94 05/29/2024     Lab Results   Component Value Date/Time    BILITOT 0.2 05/29/2024 12:54 PM    ALBUMIN 3.0 05/29/2024 12:54 PM    AST 24 05/29/2024 12:54 PM    ALT 29 05/29/2024 12:54 PM     Lab Results   Component Value Date    INR 0.9 10/09/2020    PROTIME 12.8 10/09/2020            Assessment & Plan     Preliminary assessment and Recommendations  Efrain Kumar is a 38 y.o. male being treated for:    Assessment:  1.Food

## 2024-08-27 NOTE — DISCHARGE INSTRUCTIONS
Upper GI Endoscopy  Your Recovery  You may have difficulty or pain with swallowing and/or nausea these symptoms should improve with each day.  This care sheet gives you a general idea about what to expect after the test.  How can you care for yourself at home?  Activity  Rest as much as you need to after you go home.  You should be able to go back to your usual activities the day after the test.  Diet  Follow any directions for diet.  Drink plenty of fluids (unless your doctor has told you not to).  Medications  If you have a sore throat the day after the test, use an over-the-counter spray to numb your throat.      Follow-up care is a key part of your treatment and safety. Be sure to make and go to all appointments, and call your doctor if you are having problems. It's also a good idea to know your test results and keep a list of the medicines you take.    When should you call for help?  Call 911 anytime you think you may need emergency care. For example, call if:  You passed out (lost consciousness).  You cough up blood.  You vomit blood or what looks like coffee grounds.  You pass maroon or very bloody stools.  Call your doctor now or seek immediate medical care if:  You have trouble swallowing, fever, bleeding.  Difficulty breathing.  Chest pain.  You have belly pain.  Your stools are black and tarlike or have streaks of blood.  You are sick to your stomach or cannot keep fluids down.  Watch closely for changes in your health, and be sure to contact your doctor if:  Your throat still hurts after a day or two.  You do not get better as expected.    After general anesthesia or intravenous sedation, for 24 hours or while taking prescription Narcotics:  Limit your activities  A responsible adult needs to be with you for the next 24 hours  Do not drive and operate hazardous machinery  Do not make important personal or business decisions  Do not drink alcoholic beverages  If you have not urinated within 8 hours after

## 2024-08-27 NOTE — ANESTHESIA PRE PROCEDURE
Department of Anesthesiology  Preprocedure Note       Name:  Efrain Kumar   Age:  38 y.o.  :  1985                                          MRN:  [id number]         Date:  2024      Surgeon: Surgeon(s):  Abraham Draper MD    Procedure: Procedure(s):  ESOPHAGOGASTRODUODENOSCOPY    Medications prior to admission:   Prior to Admission medications    Medication Sig Start Date End Date Taking? Authorizing Provider   glipiZIDE (GLUCOTROL) 10 MG tablet Take 1 tablet by mouth daily 24  ProviderLefty MD   aspirin 81 MG chewable tablet Take 1 tablet by mouth daily 24  Lefty Norton MD   amLODIPine (NORVASC) 5 MG tablet Take 1 tablet by mouth daily 24   Kentrell Schwartz DO   nitroGLYCERIN (NITROSTAT) 0.4 MG SL tablet Place 1 tablet under the tongue every 5 minutes as needed for Chest pain up to max of 3 total doses. If no relief after 1 dose, call 911. 24   Kentrell Schwartz DO   insulin glargine-yfgn (SEMGLEE, YFGN,) 100 UNIT/ML injection vial Inject 40 Units into the skin at bedtime 3/18/24 9/14/24  Alexandria Gastelum APRN - CNP   buPROPion (WELLBUTRIN XL) 150 MG extended release tablet Take 1 tablet by mouth every morning  Patient not taking: Reported on 2024 3/18/24   Alexandria Gastelum APRN - CNP   MOUNJARO 2.5 MG/0.5ML SOPN SC injection Inject 0.5 mLs into the skin once a week E11.65  Patient not taking: Reported on 2024 3/18/24   Alexandria Gastelum APRN - CNP   insulin lispro, 1 Unit Dial, (HUMALOG KWIKPEN) 100 UNIT/ML SOPN Take 35 U SC 3 times daily before meals 24   Alexandria Gastelum APRN - CNP   atorvastatin (LIPITOR) 80 MG tablet Take 1 tablet by mouth daily  Patient taking differently: Take 0.5 tablets by mouth daily 24   Alexandria Gastelum APRN - CNP   Continuous Blood Gluc Sensor (DEXCOM G6 SENSOR) MISC Change sensor every 10-14 days.k blood glucose readings thru out the day.  E11.65. 24   Melina Zuleta    Procedure Laterality Date    ABSCESS DRAINAGE Right     foot    ADENOIDECTOMY  age13    APPENDECTOMY  1996    CHOLECYSTECTOMY, LAPAROSCOPIC N/A 7/13/2023    CHOLECYSTECTOMY LAPAROSCOPIC performed by Ysabel Moore MD at [facility] MAIN OR    INCISION AND DRAINAGE OF WOUND Right     staph infection to Right knee area    KNEE ARTHROSCOPY Left     X3    ORTHOPEDIC SURGERY  2015    right shoulder repair    SHOULDER ARTHROSCOPY Left 6/8/2023    LEFT SHOULDER ARTHROSCOPY MANIPULATION UNDER ANESTHESIA/LYSIS OF ADHESIONS performed by CRIS Toribio MD at [facility] OPC    TOE AMPUTATION Right     Right great toe    TONSILLECTOMY  age 14    UPPER GASTROINTESTINAL ENDOSCOPY N/A 7/11/2023    EGD ESOPHAGOGASTRODUODENOSCOPY performed by Raissa Ellis MD at [facility] ENDOSCOPY       Social History:    Social History     Tobacco Use    Smoking status: Never    Smokeless tobacco: Never   Substance Use Topics    Alcohol use: Yes     Comment: rarely                                Counseling given: Not Answered      Vital Signs (Current):   There were no vitals filed for this visit.                                             BP Readings from Last 3 Encounters:   08/26/24 (!) 144/102   05/30/24 128/86   05/29/24 (!) 147/80       NPO Status:                                                                                 BMI:   Wt Readings from Last 3 Encounters:   08/26/24 (!) 144.2 kg (318 lb)   05/30/24 (!) 145.2 kg (320 lb 3.2 oz)   05/29/24 (!) 138.3 kg (305 lb)     There is no height or weight on file to calculate BMI.    CBC:   Lab Results   Component Value Date/Time    WBC 7.2 05/29/2024 12:54 PM    RBC 4.81 05/29/2024 12:54 PM    HGB 12.5 05/29/2024 12:54 PM    HCT 38.2 05/29/2024 12:54 PM    MCV 79.4 05/29/2024 12:54 PM    MCV 79.9 01/31/2020 03:23 PM    RDW 14.5 05/29/2024 12:54 PM     05/29/2024 12:54 PM       CMP:   Lab Results   Component Value Date/Time     05/29/2024 12:54 PM    K 4.3 05/29/2024 12:54 PM     05/29/2024 12:54 PM

## 2024-08-27 NOTE — ED PROVIDER NOTES
Emergency Department Provider Note       PCP: Alexandria Gastelum, APRN - CNP   Age: 38 y.o.   Sex: male     DISPOSITION Decision To Admit 08/26/2024 09:23:29 PM  Condition at Disposition: Stable       ICD-10-CM    1. Esophageal obstruction due to food impaction  T18.128A     W44.F3XA           Medical Decision Making     38-year-old male presents as transfer from [facility] to see gastroenterology.  Suspected esophageal obstruction with food impaction.  Dr. Draper is aware of patient.  GI nurses accompanied patient to GI lab.  He is in no distress on transfer.     1 or more acute illnesses that pose a threat to life or bodily function.   Shared medical decision making was utilized in creating the patients health plan today.    I independently ordered and reviewed each unique test.  I reviewed external records: ED visit note from an outside group.                     History     38-year-old male presents as a transfer from [facility] ED for GI consult.  Workup revealed questionable food bolus on CT scan.  Patient unable to eat or drink without vomiting.  States he has not been able to eat anything today.  [facility]    The history is provided by the patient.       ROS     Review of Systems   Constitutional:  Negative for chills, fatigue and fever.   Gastrointestinal:  Positive for vomiting.   All other systems reviewed and are negative.       Physical Exam     Vitals signs and nursing note reviewed:  Vitals:    08/26/24 2121   BP: (!) 154/98   Pulse: 78   Resp: 16   Temp: 98.3 °F (36.8 °C)   SpO2: 95%   Weight: (!) 144.2 kg (318 lb)   Height: 1.829 m (6')      Physical Exam  Vitals and nursing note reviewed.   Constitutional:       Appearance: Normal appearance.   Cardiovascular:      Rate and Rhythm: Normal rate and regular rhythm.      Pulses: Normal pulses.      Heart sounds: Normal heart sounds.   Pulmonary:      Effort: Pulmonary effort is normal.      Breath  sounds: Normal breath sounds.   Musculoskeletal:         General: Normal range of motion.   Skin:     General: Skin is warm.      Capillary Refill: Capillary refill takes less than 2 seconds.   Neurological:      General: No focal deficit present.      Mental Status: He is alert and oriented to person, place, and time.        Procedures     Procedures    No orders of the defined types were placed in this encounter.       Medications given during this emergency department visit:  Medications - No data to display    New Prescriptions    No medications on file        Past Medical History:   Diagnosis Date    Adhesive capsulitis of left shoulder 06/01/2023    Amputated toe (HCC)     right great toe    Arrhythmia     \" artery in heart goes into spams\"  no meds    Coronary artery disease involving native coronary artery with angina pectoris with documented spasm (HCC)     Deep venous thrombosis (HCC) 02/22/2023    Depression with anxiety 04/11/2017    Essential hypertension 09/02/2018    Gastroesophageal reflux disease without esophagitis 04/26/2023    History of kidney stones     multiple with surgical intervention X1    History of TIA (transient ischemic attack)     no deficits    Hx of blood clots     history of Right DVT    Hypercholesterolemia     Left shoulder pain     Morbid obesity (HCC)     BMI 39    Nausea & vomiting     DYLLAN (obstructive sleep apnea) 03/09/2016    Sleep apnea     does not wear CPAP    Stroke (HCC)     TIA per pt    Suicide attempt by benzodiazepine overdose (HCC) 10/26/2020    Type 2 diabetes mellitus (HCC)     insulin reliant/AVG FBS: 170-180/s.s of hypoglycemia at 90-95- /Last A1c: 12        Past Surgical History:   Procedure Laterality Date    ABSCESS DRAINAGE Right     foot    ADENOIDECTOMY  age15    APPENDECTOMY  1996    CHOLECYSTECTOMY, LAPAROSCOPIC N/A 7/13/2023    CHOLECYSTECTOMY LAPAROSCOPIC performed by Ysabel Moore MD at [facility] MAIN OR    INCISION AND DRAINAGE OF WOUND Right     staph

## 2024-08-27 NOTE — OP NOTE
Procedure: EGD with biopsy    Indication: Concern for food impaction    Date of Procedure: 8/26/2024    Patient profile: Refer to patient note in chart for documentation of history and physical    Providers: Abraham Draper MD    Referring MD: No ref. provider found    PCP: Alexandria Gastelum APRN - CNP    Medicines: General Anesthesia    Complication: No immediate complications.     Estimated blood loss: Minimal    Procedure: After the risks including, but not limited to medication reaction, infection, bleeding, perforation, missed lesions, benefits and alternatives of the procedure were discussed with the patient and all questions were answered, informed consent was obtained. The gastroscope was passed under direct vision throughout the procedure, the patient's blood pressure pulse and oxygen saturation were monitored continuously. The scope was introduced through the mouth and advanced to the 2nd portion of the duodenum. The endoscopy was performed without difficulty. The patient tolerated the procedure well.       Findings:   --The adult gastroscope was introduced from the mouth and advanced through the esophagus to the GE junction.   --The esophagus was largely normal. There was a ringed appearance to the esophagus. Proximal and distal esophageal biopsies obtained to rule out EOE. No food bolus was noted  --The scope was advanced to the stomach which was  normal in forward and retroflexed views. A hiatal hernia was noted  --Duodenum was normal to 2nd portion.    The scope was pulled back, and the procedure was subsequently ended.    Impression:  --Ringed apperance of esophagus. Biopsies obtained to rule out EOE.    Recommendations:  -- Recommend BID PPI  --Recommend soft diet in interim    Abraham Draper MD  Gastroenterology and Interventional Endoscopy

## 2024-08-28 ENCOUNTER — TELEPHONE (OUTPATIENT)
Dept: GASTROENTEROLOGY | Age: 39
End: 2024-08-28

## 2024-08-28 NOTE — TELEPHONE ENCOUNTER
HR=61 bpm, IXAT=949/80 mmhg, AtO9=914.0 %, Resp=13 B/min, EtCO2=41 mmHg, Apnea=3 Seconds, Pain=0, Jared=10, Maxwell=2 Pt called back requesting to speak to the nurse regarding what was dicussed this morning and his symptoms.

## 2024-08-28 NOTE — TELEPHONE ENCOUNTER
Patient called to report symptoms similar to his ED visit for food impaction. No difficulty speaking/ distress observed. Patient reported \"eating white bread\" this morning. He only started pantoprazole this morning (ordered 8/26). Advised patient to only consume clear liquid diet and to come to [facility] ED if he does not have improvement within 2 hours per Dr Draper. Advised to return call with update before 5:00 today. Patient agreed.    Patient returned call and verbalized that he is feeling somewhat better. He advised that due to body aches from procedure table he had been using Aleve. Instructed patient to avoid NSAID products and to continue pantoprazole BID, 30 minutes prior to meals. Continue liquid diet until symptoms improve then to start soft diet, chewing well after each bite. Asked patient to call office with update. He verbalized agreement/ understanding.

## 2024-09-03 ENCOUNTER — TELEPHONE (OUTPATIENT)
Dept: GASTROENTEROLOGY | Age: 39
End: 2024-09-03

## 2024-09-03 NOTE — TELEPHONE ENCOUNTER
Per Melissa Grinnell, PA: \"If he is having chest pain, exertional symptoms, shortness of breath, or intractable nausea/vomiting he needs to be directed to the ED.  Otherwise would be appropriate for office visit.  His pathology did not show EOE but he may yet still have acid reflux that is uncontrolled versus gastroparesis versus something else going on.\"    ------    Returned call to pt to relay recommendations of provider as above. Pt verbalized understanding. Pt stated that since speaking earlier today he now reports that he is unable to tolerate any food or liquid without vomiting. Pt states he tried to eat some broth earlier today and threw up shortly after consuming. Told pt that if he is not able to tolerate food/drink, the provider recommends going to the ER for comprehensive workup/treatment as appropriate. Pt verbalized understanding, but stated that he was busy tonight and will not go until tomorrow. Again repeated provider recommendations to pt stating that the vomiting may lead to dehydration and issues with diabetes. Pt verbalized understanding but stated that he was too busy tonight. Pt requested to schedule appt with Melissa Grinnell, PA as recommended. Scheduled appt for first opening in October and added pt to wait list per pt request.

## 2024-09-03 NOTE — TELEPHONE ENCOUNTER
Per PSR, pt called clinic requesting call back from nurse to discuss ongoing sx.     Returned call to pt as requested. Pt states that he is still vomiting at least once daily since eating \"BBQ\" on Saturday. Pt reports a feeling of \"something resting on the top of his stomach.\" Pt denies any feeling of something stuck in the throat, denies difficulty swallowing, difficulty breathing, and difficulty speaking. Pt denies fever/chills. Pt states he has been hydrating adequately and ate \"plain BBQ\" Saturday, threw up after eating pasta salad Sunday, and only ate salad yesterday. Told pt to resume clear liquid diet again for today to give stomach time to rest, then slowly advance diet to soft foods as previously instructed by SANJU Box, in addition to implementing anti-reflux measures. Instructed pt to go to ER if vomiting worsens, unable to keep down food and drink, or he develops difficulty swallowing or breathing. Additionally reminded pt to try OTC reflux medication like Pepcid before bed in addition to pantoprazole BID which pt reports taking as prescribed. Pt verbalized understanding, agreeable to recommendations. Told pt we would forward message to provider to advise next steps or additional recommendations, pt agreeable to plan.

## 2024-10-22 ENCOUNTER — HOSPITAL ENCOUNTER (EMERGENCY)
Age: 39
Discharge: HOME | End: 2024-10-22
Attending: EMERGENCY MEDICINE
Payer: COMMERCIAL

## 2024-10-22 VITALS
HEIGHT: 72 IN | RESPIRATION RATE: 20 BRPM | BODY MASS INDEX: 41.31 KG/M2 | HEART RATE: 76 BPM | WEIGHT: 305 LBS | OXYGEN SATURATION: 92 % | SYSTOLIC BLOOD PRESSURE: 141 MMHG | DIASTOLIC BLOOD PRESSURE: 87 MMHG | TEMPERATURE: 97.8 F

## 2024-10-22 DIAGNOSIS — E11.42 DIABETIC POLYNEUROPATHY ASSOCIATED WITH TYPE 2 DIABETES MELLITUS (HCC): ICD-10-CM

## 2024-10-22 DIAGNOSIS — R73.9 HYPERGLYCEMIA: Primary | ICD-10-CM

## 2024-10-22 LAB
ALBUMIN SERPL-MCNC: 3.8 G/DL (ref 3.5–5)
ALBUMIN/GLOB SERPL: 1.2 (ref 0.4–1.6)
ALP SERPL-CCNC: 179 U/L (ref 45–117)
ALT SERPL-CCNC: 46 U/L (ref 13–61)
ANION GAP SERPL CALC-SCNC: 12 MMOL/L (ref 9–18)
APPEARANCE UR: CLEAR
AST SERPL-CCNC: 20 U/L (ref 15–37)
BASOPHILS # BLD: 0 K/UL (ref 0–0.2)
BASOPHILS NFR BLD: 1 % (ref 0–2)
BILIRUB SERPL-MCNC: 0.3 MG/DL (ref 0.2–1.1)
BILIRUB UR QL: NEGATIVE
BUN SERPL-MCNC: 16 MG/DL (ref 6–23)
CALCIUM SERPL-MCNC: 8.9 MG/DL (ref 8.3–10.4)
CHLORIDE SERPL-SCNC: 98 MMOL/L (ref 98–107)
CO2 SERPL-SCNC: 25 MMOL/L (ref 21–32)
COLOR UR: YELLOW
CREAT SERPL-MCNC: 0.86 MG/DL (ref 0.8–1.5)
D DIMER PPP FEU-MCNC: 0.27 UG/ML(FEU)
DIFFERENTIAL METHOD BLD: ABNORMAL
EOSINOPHIL # BLD: 0.3 K/UL (ref 0–0.8)
EOSINOPHIL NFR BLD: 3 % (ref 0.5–7.8)
ERYTHROCYTE [DISTWIDTH] IN BLOOD BY AUTOMATED COUNT: 13.5 % (ref 11.9–14.6)
GLOBULIN SER CALC-MCNC: 3.3 G/DL (ref 2.8–4.5)
GLUCOSE BLD STRIP.AUTO-MCNC: 322 MG/DL (ref 65–100)
GLUCOSE BLD STRIP.AUTO-MCNC: 390 MG/DL (ref 65–100)
GLUCOSE SERPL-MCNC: 394 MG/DL (ref 65–100)
GLUCOSE UR STRIP.AUTO-MCNC: 500 MG/DL
HCT VFR BLD AUTO: 40.6 % (ref 41.1–50.3)
HGB BLD-MCNC: 13.3 G/DL (ref 13.6–17.2)
HGB UR QL STRIP: NEGATIVE
IMM GRANULOCYTES # BLD AUTO: 0.1 K/UL (ref 0–0.5)
IMM GRANULOCYTES NFR BLD AUTO: 1 % (ref 0–5)
KETONES UR QL STRIP.AUTO: NEGATIVE MG/DL
LEUKOCYTE ESTERASE UR QL STRIP.AUTO: NEGATIVE
LYMPHOCYTES # BLD: 1.2 K/UL (ref 0.5–4.6)
LYMPHOCYTES NFR BLD: 15 % (ref 13–44)
MCH RBC QN AUTO: 25.9 PG (ref 26.1–32.9)
MCHC RBC AUTO-ENTMCNC: 32.8 G/DL (ref 31.4–35)
MCV RBC AUTO: 79 FL (ref 82–102)
MONOCYTES # BLD: 0.5 K/UL (ref 0.1–1.3)
MONOCYTES NFR BLD: 6 % (ref 4–12)
NEUTS SEG # BLD: 6 K/UL (ref 1.7–8.2)
NEUTS SEG NFR BLD: 75 % (ref 43–78)
NITRITE UR QL STRIP.AUTO: NEGATIVE
NRBC # BLD: 0 K/UL (ref 0–0.2)
PH UR STRIP: 5.5 (ref 5–9)
PLATELET # BLD AUTO: 229 K/UL (ref 150–450)
PMV BLD AUTO: 9.4 FL (ref 9.4–12.3)
POTASSIUM SERPL-SCNC: 4.4 MMOL/L (ref 3.5–5.1)
PROT SERPL-MCNC: 7.1 G/DL (ref 6.4–8.2)
PROT UR STRIP-MCNC: NEGATIVE MG/DL
RBC # BLD AUTO: 5.14 M/UL (ref 4.23–5.6)
SERVICE CMNT-IMP: ABNORMAL
SERVICE CMNT-IMP: ABNORMAL
SODIUM SERPL-SCNC: 135 MMOL/L (ref 133–143)
SP GR UR REFRACTOMETRY: 1.01 (ref 1–1.02)
UROBILINOGEN UR QL STRIP.AUTO: 0.2 EU/DL (ref 0.2–1)
WBC # BLD AUTO: 8.1 K/UL (ref 4.3–11.1)

## 2024-10-22 PROCEDURE — 6370000000 HC RX 637 (ALT 250 FOR IP): Performed by: EMERGENCY MEDICINE

## 2024-10-22 PROCEDURE — 6360000002 HC RX W HCPCS: Performed by: EMERGENCY MEDICINE

## 2024-10-22 PROCEDURE — 85379 FIBRIN DEGRADATION QUANT: CPT

## 2024-10-22 PROCEDURE — 80053 COMPREHEN METABOLIC PANEL: CPT

## 2024-10-22 PROCEDURE — 96374 THER/PROPH/DIAG INJ IV PUSH: CPT

## 2024-10-22 PROCEDURE — 82962 GLUCOSE BLOOD TEST: CPT

## 2024-10-22 PROCEDURE — 99284 EMERGENCY DEPT VISIT MOD MDM: CPT

## 2024-10-22 PROCEDURE — 81003 URINALYSIS AUTO W/O SCOPE: CPT

## 2024-10-22 PROCEDURE — 85025 COMPLETE CBC W/AUTO DIFF WBC: CPT

## 2024-10-22 RX ORDER — INSULIN GLARGINE 100 [IU]/ML
50 INJECTION, SOLUTION SUBCUTANEOUS NIGHTLY
Qty: 5 ADJUSTABLE DOSE PRE-FILLED PEN SYRINGE | Refills: 0 | Status: SHIPPED | OUTPATIENT
Start: 2024-10-22 | End: 2024-11-21

## 2024-10-22 RX ORDER — KETOROLAC TROMETHAMINE 30 MG/ML
15 INJECTION, SOLUTION INTRAMUSCULAR; INTRAVENOUS ONCE
Status: COMPLETED | OUTPATIENT
Start: 2024-10-22 | End: 2024-10-22

## 2024-10-22 RX ADMIN — KETOROLAC TROMETHAMINE 15 MG: 30 INJECTION, SOLUTION INTRAMUSCULAR at 08:48

## 2024-10-22 RX ADMIN — INSULIN HUMAN 5 UNITS: 100 INJECTION, SOLUTION PARENTERAL at 09:40

## 2024-10-22 ASSESSMENT — PAIN DESCRIPTION - DESCRIPTORS: DESCRIPTORS: ACHING

## 2024-10-22 ASSESSMENT — PAIN SCALES - GENERAL
PAINLEVEL_OUTOF10: 5
PAINLEVEL_OUTOF10: 3

## 2024-10-22 ASSESSMENT — ENCOUNTER SYMPTOMS
NAUSEA: 0
SHORTNESS OF BREATH: 0
COUGH: 0
RHINORRHEA: 0
VOMITING: 0

## 2024-10-22 ASSESSMENT — PAIN - FUNCTIONAL ASSESSMENT: PAIN_FUNCTIONAL_ASSESSMENT: NONE - DENIES PAIN

## 2024-10-22 ASSESSMENT — PAIN DESCRIPTION - LOCATION: LOCATION: GENERALIZED

## 2024-10-22 NOTE — DISCHARGE INSTRUCTIONS
Please any fevers, vomiting, difficulty breathing, worsening symptoms, or additional concerns.    Please follow-up with your primary care doctor for reevaluation.
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
## 2025-01-21 ENCOUNTER — APPOINTMENT (OUTPATIENT)
Dept: CT IMAGING | Age: 40
End: 2025-01-21
Payer: COMMERCIAL

## 2025-01-21 ENCOUNTER — APPOINTMENT (OUTPATIENT)
Dept: GENERAL RADIOLOGY | Age: 40
End: 2025-01-21
Payer: COMMERCIAL

## 2025-01-21 ENCOUNTER — HOSPITAL ENCOUNTER (EMERGENCY)
Age: 40
Discharge: HOME | End: 2025-01-21
Payer: COMMERCIAL

## 2025-01-21 ENCOUNTER — APPOINTMENT (OUTPATIENT)
Dept: ULTRASOUND IMAGING | Age: 40
End: 2025-01-21
Payer: COMMERCIAL

## 2025-01-21 VITALS
TEMPERATURE: 98.7 F | BODY MASS INDEX: 37.93 KG/M2 | DIASTOLIC BLOOD PRESSURE: 70 MMHG | SYSTOLIC BLOOD PRESSURE: 165 MMHG | HEIGHT: 72 IN | OXYGEN SATURATION: 98 % | HEART RATE: 80 BPM | RESPIRATION RATE: 15 BRPM | WEIGHT: 280 LBS

## 2025-01-21 DIAGNOSIS — E11.621 DIABETIC ULCER OF RIGHT FOOT ASSOCIATED WITH TYPE 2 DIABETES MELLITUS, LIMITED TO BREAKDOWN OF SKIN, UNSPECIFIED PART OF FOOT (HCC): Primary | ICD-10-CM

## 2025-01-21 DIAGNOSIS — R55 SYNCOPE AND COLLAPSE: ICD-10-CM

## 2025-01-21 DIAGNOSIS — L97.511 DIABETIC ULCER OF RIGHT FOOT ASSOCIATED WITH TYPE 2 DIABETES MELLITUS, LIMITED TO BREAKDOWN OF SKIN, UNSPECIFIED PART OF FOOT (HCC): Primary | ICD-10-CM

## 2025-01-21 DIAGNOSIS — I82.491 DEEP VEIN THROMBOSIS (DVT) OF OTHER VEIN OF RIGHT LOWER EXTREMITY, UNSPECIFIED CHRONICITY (HCC): ICD-10-CM

## 2025-01-21 LAB
ALBUMIN SERPL-MCNC: 4.1 G/DL (ref 3.5–5)
ALBUMIN/GLOB SERPL: 1.1 (ref 1–1.9)
ALP SERPL-CCNC: 168 U/L (ref 40–129)
ALT SERPL-CCNC: 37 U/L (ref 12–65)
ANION GAP SERPL CALC-SCNC: 13 MMOL/L (ref 7–16)
AST SERPL-CCNC: 20 U/L (ref 15–37)
BASOPHILS # BLD: 0.05 K/UL (ref 0–0.2)
BASOPHILS NFR BLD: 0.7 % (ref 0–2)
BILIRUB SERPL-MCNC: 0.3 MG/DL (ref 0–1.2)
BUN SERPL-MCNC: 12 MG/DL (ref 6–23)
CALCIUM SERPL-MCNC: 9.4 MG/DL (ref 8.8–10.2)
CHLORIDE SERPL-SCNC: 100 MMOL/L (ref 98–107)
CO2 SERPL-SCNC: 25 MMOL/L (ref 20–29)
CREAT SERPL-MCNC: 0.99 MG/DL (ref 0.8–1.3)
CRP SERPL-MCNC: 2.9 MG/DL (ref 0–0.9)
DIFFERENTIAL METHOD BLD: ABNORMAL
EKG ATRIAL RATE: 82 BPM
EKG DIAGNOSIS: NORMAL
EKG P AXIS: 47 DEGREES
EKG P-R INTERVAL: 172 MS
EKG Q-T INTERVAL: 360 MS
EKG QRS DURATION: 98 MS
EKG QTC CALCULATION (BAZETT): 421 MS
EKG R AXIS: -11 DEGREES
EKG T AXIS: 37 DEGREES
EKG VENTRICULAR RATE: 82 BPM
EOSINOPHIL # BLD: 0.21 K/UL (ref 0–0.8)
EOSINOPHIL NFR BLD: 3 % (ref 0.5–7.8)
ERYTHROCYTE [DISTWIDTH] IN BLOOD BY AUTOMATED COUNT: 14 % (ref 11.9–14.6)
ERYTHROCYTE [SEDIMENTATION RATE] IN BLOOD: 12 MM/HR (ref 0–15)
GLOBULIN SER CALC-MCNC: 3.8 G/DL (ref 2.3–3.5)
GLUCOSE SERPL-MCNC: 349 MG/DL (ref 65–100)
HCT VFR BLD AUTO: 42.8 % (ref 41.1–50.3)
HGB BLD-MCNC: 14.1 G/DL (ref 13.6–17.2)
IMM GRANULOCYTES # BLD AUTO: 0.04 K/UL (ref 0–0.5)
IMM GRANULOCYTES NFR BLD AUTO: 0.6 % (ref 0–5)
INR PPP: 1
LYMPHOCYTES # BLD: 1.12 K/UL (ref 0.5–4.6)
LYMPHOCYTES NFR BLD: 15.8 % (ref 13–44)
MAGNESIUM SERPL-MCNC: 1.7 MG/DL (ref 1.8–2.4)
MCH RBC QN AUTO: 26.2 PG (ref 26.1–32.9)
MCHC RBC AUTO-ENTMCNC: 32.9 G/DL (ref 31.4–35)
MCV RBC AUTO: 79.6 FL (ref 82–102)
MONOCYTES # BLD: 0.29 K/UL (ref 0.1–1.3)
MONOCYTES NFR BLD: 4.1 % (ref 4–12)
NEUTS SEG # BLD: 5.39 K/UL (ref 1.7–8.2)
NEUTS SEG NFR BLD: 75.8 % (ref 43–78)
NRBC # BLD: 0 K/UL (ref 0–0.2)
PLATELET # BLD AUTO: 304 K/UL (ref 150–450)
PMV BLD AUTO: 9.8 FL (ref 9.4–12.3)
POTASSIUM SERPL-SCNC: 4.2 MMOL/L (ref 3.5–5.1)
PROT SERPL-MCNC: 7.9 G/DL (ref 6.3–8.2)
PROTHROMBIN TIME: 13.1 SEC (ref 11.3–14.9)
RBC # BLD AUTO: 5.38 M/UL (ref 4.23–5.6)
SODIUM SERPL-SCNC: 138 MMOL/L (ref 133–143)
TROPONIN T SERPL HS-MCNC: 13.2 NG/L (ref 0–22)
WBC # BLD AUTO: 7.1 K/UL (ref 4.3–11.1)

## 2025-01-21 PROCEDURE — 93971 EXTREMITY STUDY: CPT

## 2025-01-21 PROCEDURE — 93005 ELECTROCARDIOGRAM TRACING: CPT | Performed by: PHYSICIAN ASSISTANT

## 2025-01-21 PROCEDURE — 2580000003 HC RX 258: Performed by: PHYSICIAN ASSISTANT

## 2025-01-21 PROCEDURE — 86140 C-REACTIVE PROTEIN: CPT

## 2025-01-21 PROCEDURE — 83735 ASSAY OF MAGNESIUM: CPT

## 2025-01-21 PROCEDURE — 96365 THER/PROPH/DIAG IV INF INIT: CPT

## 2025-01-21 PROCEDURE — 87040 BLOOD CULTURE FOR BACTERIA: CPT

## 2025-01-21 PROCEDURE — 85025 COMPLETE CBC W/AUTO DIFF WBC: CPT

## 2025-01-21 PROCEDURE — 85610 PROTHROMBIN TIME: CPT

## 2025-01-21 PROCEDURE — 71046 X-RAY EXAM CHEST 2 VIEWS: CPT

## 2025-01-21 PROCEDURE — 73630 X-RAY EXAM OF FOOT: CPT

## 2025-01-21 PROCEDURE — 6360000002 HC RX W HCPCS: Performed by: PHYSICIAN ASSISTANT

## 2025-01-21 PROCEDURE — 85652 RBC SED RATE AUTOMATED: CPT

## 2025-01-21 PROCEDURE — 99285 EMERGENCY DEPT VISIT HI MDM: CPT

## 2025-01-21 PROCEDURE — 70450 CT HEAD/BRAIN W/O DYE: CPT

## 2025-01-21 PROCEDURE — 93010 ELECTROCARDIOGRAM REPORT: CPT | Performed by: INTERNAL MEDICINE

## 2025-01-21 PROCEDURE — 80053 COMPREHEN METABOLIC PANEL: CPT

## 2025-01-21 PROCEDURE — 84484 ASSAY OF TROPONIN QUANT: CPT

## 2025-01-21 RX ORDER — CLINDAMYCIN HYDROCHLORIDE 300 MG/1
300 CAPSULE ORAL 3 TIMES DAILY
Qty: 21 CAPSULE | Refills: 0 | Status: SHIPPED | OUTPATIENT
Start: 2025-01-21 | End: 2025-01-28

## 2025-01-21 RX ADMIN — PIPERACILLIN AND TAZOBACTAM 4500 MG: 4; .5 INJECTION, POWDER, FOR SOLUTION INTRAVENOUS at 10:58

## 2025-01-21 ASSESSMENT — ENCOUNTER SYMPTOMS
COLOR CHANGE: 1
GASTROINTESTINAL NEGATIVE: 1
RESPIRATORY NEGATIVE: 1

## 2025-01-21 ASSESSMENT — LIFESTYLE VARIABLES
HOW OFTEN DO YOU HAVE A DRINK CONTAINING ALCOHOL: 2-4 TIMES A MONTH
HOW MANY STANDARD DRINKS CONTAINING ALCOHOL DO YOU HAVE ON A TYPICAL DAY: 1 OR 2

## 2025-01-21 ASSESSMENT — PAIN SCALES - GENERAL: PAINLEVEL_OUTOF10: 4

## 2025-01-21 NOTE — ED PROVIDER NOTES
Emergency Department Provider Note       PCP: Alexandria Gastelum, APRN - CNP   Age: 39 y.o.   Sex: male     DISPOSITION Decision To Discharge 01/21/2025 01:35:35 PM    ICD-10-CM    1. Diabetic ulcer of right foot associated with type 2 diabetes mellitus, limited to breakdown of skin, unspecified part of foot (HCC)  E11.621 [facility]    L97.511       2. Deep vein thrombosis (DVT) of other vein of right lower extremity, unspecified chronicity (HCC)  I82.491 [facility]      3. Syncope and collapse  R55 [facility]          Medical Decision Making     39-year-old male with multiple chronic medical problems and noncompliant with medication presents with a diabetic foot ulcer and calf pain.  Symptoms present for about a week.  History of DVT but no longer anticoagulated.  No chest pain or shortness of breath.  Also had syncope or presyncope over the past few days.  Workup reveals right-sided DVT that is nonocclusive.  Also has a diabetic foot ulcer with minimal surrounding erythema.  Labs reassuring.  Will trial outpatient treatment, but he is to return if worsening.  Recommended close follow-up outpatient with podiatrist and primary doctor and strict return precautions were given.  ED Course as of 01/21/25 1628   Tue Jan 21, 2025   1057 EKG 12 Lead  EKG shows sinus rhythm at rate of 82.  No acute ischemic changes.  No STEMI. [GENOVEVA]   1328 dvt right mid to distal femoral, right popliteal [GENOVEVA]      ED Course User Index  [GENOVEVA] Shiloh Crandall PA     1 or more acute illnesses that pose a threat to life or bodily function.   Prescription drug management performed.  Patient was discharged risks and benefits of hospitalization were considered.  Shared medical decision making was utilized in creating the patients health plan today.  I independently ordered and reviewed each unique test.           I

## 2025-01-21 NOTE — ED TRIAGE NOTES
Pt presents to ED with c/o wound to base of right foot where great toe would be. Pa at bedside in triage. Pt states he also had two syncopal episodes yesterday.

## 2025-01-26 LAB
BACTERIA SPEC CULT: NORMAL
BACTERIA SPEC CULT: NORMAL
SERVICE CMNT-IMP: NORMAL
SERVICE CMNT-IMP: NORMAL

## 2025-04-25 ENCOUNTER — HOSPITAL ENCOUNTER (EMERGENCY)
Age: 40
Discharge: HOME | End: 2025-04-25
Attending: EMERGENCY MEDICINE

## 2025-04-25 VITALS
OXYGEN SATURATION: 98 % | SYSTOLIC BLOOD PRESSURE: 161 MMHG | TEMPERATURE: 98.4 F | RESPIRATION RATE: 19 BRPM | WEIGHT: 290 LBS | DIASTOLIC BLOOD PRESSURE: 93 MMHG | HEART RATE: 80 BPM | BODY MASS INDEX: 39.28 KG/M2 | HEIGHT: 72 IN

## 2025-04-25 DIAGNOSIS — J06.9 VIRAL URI WITH COUGH: Primary | ICD-10-CM

## 2025-04-25 LAB
FLUAV RNA SPEC QL NAA+PROBE: NOT DETECTED
FLUBV RNA SPEC QL NAA+PROBE: NOT DETECTED
SARS-COV-2 RDRP RESP QL NAA+PROBE: NOT DETECTED
SOURCE: NORMAL

## 2025-04-25 PROCEDURE — 99283 EMERGENCY DEPT VISIT LOW MDM: CPT

## 2025-04-25 PROCEDURE — 87636 SARSCOV2 & INF A&B AMP PRB: CPT

## 2025-04-25 RX ORDER — HYDROCODONE BITARTRATE AND HOMATROPINE METHYLBROMIDE ORAL SOLUTION 5; 1.5 MG/5ML; MG/5ML
5 LIQUID ORAL 4 TIMES DAILY PRN
Qty: 120 ML | Refills: 0 | Status: SHIPPED | OUTPATIENT
Start: 2025-04-25 | End: 2025-05-01

## 2025-04-25 ASSESSMENT — PAIN - FUNCTIONAL ASSESSMENT: PAIN_FUNCTIONAL_ASSESSMENT: NONE - DENIES PAIN

## 2025-04-25 NOTE — ED TRIAGE NOTES
Patient has had four days of loss of taste, cough, and sinus drainage. Denies fever or chills. Took home covid and flu test which were negative.  Patient taking afrin and sudafed

## 2025-04-25 NOTE — ED PROVIDER NOTES
obesity (HCC)     BMI 39    Nausea & vomiting     DYLLAN (obstructive sleep apnea) 03/09/2016    Sleep apnea     does not wear CPAP    Stroke (HCC)     TIA per pt    Suicide attempt by benzodiazepine overdose (HCC) 10/26/2020    Type 2 diabetes mellitus (HCC)     insulin reliant/AVG FBS: 170-180/s.s of hypoglycemia at 90-95- /Last A1c: 12        Past Surgical History:   Procedure Laterality Date    ABSCESS DRAINAGE Right     foot    ADENOIDECTOMY  age15    APPENDECTOMY  1996    CHOLECYSTECTOMY, LAPAROSCOPIC N/A 7/13/2023    CHOLECYSTECTOMY LAPAROSCOPIC performed by Ysabel Moore MD at [facility] MAIN OR    INCISION AND DRAINAGE OF WOUND Right     staph infection to Right knee area    KNEE ARTHROSCOPY Left     X3    ORTHOPEDIC SURGERY  2015    right shoulder repair    SHOULDER ARTHROSCOPY Left 6/8/2023    LEFT SHOULDER ARTHROSCOPY MANIPULATION UNDER ANESTHESIA/LYSIS OF ADHESIONS performed by CRIS Toribio MD at [facility] OPC    TOE AMPUTATION Right     Right great toe    TONSILLECTOMY  age 14    UPPER GASTROINTESTINAL ENDOSCOPY N/A 7/11/2023    EGD ESOPHAGOGASTRODUODENOSCOPY performed by Raissa Ellis MD at [facility] ENDOSCOPY    UPPER GASTROINTESTINAL ENDOSCOPY N/A 8/26/2024    ESOPHAGOGASTRODUODENOSCOPY BIOPSY performed by Abraham Draper MD at [facility] ENDOSCOPY        Social History     Socioeconomic History    Marital status:    Tobacco Use    Smoking status: Never    Smokeless tobacco: Never   Vaping Use    Vaping status: Never Used   Substance and Sexual Activity    Alcohol use: Yes     Comment: rarely    Drug use: Yes     Types: Marijuana (Weed)     Social Drivers of Health     Financial Resource Strain: Low Risk  (1/23/2024)    Overall Financial Resource Strain (CARDIA)     Difficulty of Paying Living Expenses: Not hard at all   Food Insecurity: No Food Insecurity (1/23/2024)    Hunger Vital Sign     Worried About Running Out of Food in the Last Year: Never true     Ran Out of Food in the Last Year: Never true   Transportation Needs:  Unknown (1/23/2024)    PRAPARE - Transportation     Lack of Transportation (Non-Medical): No   Physical Activity: Inactive (3/1/2022)    Received from [facility]    Physical Activity     Days of Exercise per Week: 0 days     Minutes of Exercise per Session: 0     Total Minutes of Exercise per Week: 0   Social Connections: Unknown (3/19/2021)    Received from [facility]    Social Connections     Frequency of Communication with Friends and Family: Not asked     Frequency of Social Gatherings with Friends and Family: Not asked   Intimate Partner Violence: Unknown (3/19/2021)    Received from [facility]    Intimate Partner Violence     Fear of Current or Ex-Partner: Not asked     Emotionally Abused: Not asked     Physically Abused: Not asked     Sexually Abused: Not asked   Housing Stability: Unknown (1/23/2024)    Housing Stability Vital Sign     Unstable Housing in the Last Year: No        Discharge Medication List as of 4/25/2025  9:33 AM        CONTINUE these medications which have NOT CHANGED    Details   apixaban (ELIQUIS) 5 MG TABS tablet Take 1 tablet by mouth 2 times daily Start taking this after you finish the other prescription, Disp-60 tablet, R-0Normal      insulin glargine (LANTUS) 100 UNIT/ML injection vial Inject 50 Units into the skin nightly, Disp-5 Adjustable Dose Pre-filled Pen Syringe, R-0Print      pantoprazole (PROTONIX) 40 MG tablet Take 1 tablet by mouth 2 times daily (before meals), Disp-180 tablet, R-1Normal      aspirin 81 MG chewable tablet Take 1 tablet by mouth dailyHistorical Med      amLODIPine (NORVASC) 5 MG tablet Take 1 tablet by mouth daily, Disp-90 tablet, R-3Normal      nitroGLYCERIN (NITROSTAT) 0.4 MG SL tablet Place 1 tablet under the tongue every 5 minutes as needed for Chest pain up to max of 3 total doses. If no relief after 1 dose, call 911., Disp-25 tablet, R-3Normal      buPROPion (WELLBUTRIN XL) 150 MG extended release

## 2025-04-25 NOTE — DISCHARGE INSTRUCTIONS
Consider Afrin or Keyshawn-Synephrine 12-hour nasal spray for up to 3 days.  Do not use for more than 3 days, as you will develop a dependence to it.

## 2025-07-17 ENCOUNTER — HOSPITAL ENCOUNTER (EMERGENCY)
Age: 40
Discharge: HOME | End: 2025-07-17
Attending: EMERGENCY MEDICINE
Payer: COMMERCIAL

## 2025-07-17 VITALS
HEART RATE: 80 BPM | TEMPERATURE: 97.6 F | WEIGHT: 292 LBS | SYSTOLIC BLOOD PRESSURE: 148 MMHG | BODY MASS INDEX: 39.55 KG/M2 | OXYGEN SATURATION: 96 % | HEIGHT: 72 IN | RESPIRATION RATE: 14 BRPM | DIASTOLIC BLOOD PRESSURE: 79 MMHG

## 2025-07-17 DIAGNOSIS — B37.42 CANDIDAL BALANITIS: Primary | ICD-10-CM

## 2025-07-17 LAB
APPEARANCE UR: CLEAR
BACTERIA URNS QL MICRO: 0 /HPF
BILIRUB UR QL: NEGATIVE
COLOR UR: YELLOW
EPI CELLS #/AREA URNS HPF: ABNORMAL /HPF
GLUCOSE UR STRIP.AUTO-MCNC: NEGATIVE MG/DL
HGB UR QL STRIP: ABNORMAL
KETONES UR QL STRIP.AUTO: NEGATIVE MG/DL
LEUKOCYTE ESTERASE UR QL STRIP.AUTO: NEGATIVE
MUCOUS THREADS URNS QL MICRO: ABNORMAL /LPF
NITRITE UR QL STRIP.AUTO: NEGATIVE
OTHER OBSERVATIONS: ABNORMAL
PH UR STRIP: 5.5 (ref 5–9)
PROT UR STRIP-MCNC: 100 MG/DL
RBC #/AREA URNS HPF: ABNORMAL /HPF
SP GR UR REFRACTOMETRY: 1.02 (ref 1–1.02)
UROBILINOGEN UR QL STRIP.AUTO: 0.2 EU/DL (ref 0.2–1)
WBC URNS QL MICRO: ABNORMAL /HPF

## 2025-07-17 PROCEDURE — 81001 URINALYSIS AUTO W/SCOPE: CPT

## 2025-07-17 PROCEDURE — 99283 EMERGENCY DEPT VISIT LOW MDM: CPT

## 2025-07-17 RX ORDER — CLOTRIMAZOLE 1 %
CREAM (GRAM) TOPICAL
Qty: 60 G | Refills: 0 | Status: SHIPPED | OUTPATIENT
Start: 2025-07-17 | End: 2025-07-24

## 2025-07-17 ASSESSMENT — PAIN - FUNCTIONAL ASSESSMENT
PAIN_FUNCTIONAL_ASSESSMENT: 0-10
PAIN_FUNCTIONAL_ASSESSMENT: 0-10

## 2025-07-17 ASSESSMENT — PAIN SCALES - GENERAL
PAINLEVEL_OUTOF10: 9
PAINLEVEL_OUTOF10: 9

## 2025-07-17 ASSESSMENT — ENCOUNTER SYMPTOMS: VOMITING: 0

## 2025-07-17 NOTE — ED PROVIDER NOTES
reviewed:  Vitals:    07/17/25 0657   BP: (!) 159/99   Pulse: 78   Resp: 17   Temp: 97.6 °F (36.4 °C)   TempSrc: Oral   SpO2: 92%   Weight: 132.5 kg (292 lb)   Height: 1.829 m (6')      Physical Exam  Vitals and nursing note reviewed.   Constitutional:       General: He is in acute distress.      Appearance: Normal appearance. He is obese.   HENT:      Head: Normocephalic and atraumatic.   Eyes:      General: No scleral icterus.     Extraocular Movements: Extraocular movements intact.      Conjunctiva/sclera: Conjunctivae normal.      Pupils: Pupils are equal, round, and reactive to light.   Cardiovascular:      Rate and Rhythm: Normal rate and regular rhythm.      Pulses: Normal pulses.      Heart sounds: Normal heart sounds.   Pulmonary:      Effort: Pulmonary effort is normal. No respiratory distress.      Breath sounds: Normal breath sounds.   Abdominal:      General: Bowel sounds are normal. There is no distension.      Palpations: Abdomen is soft. There is no mass.      Tenderness: There is no abdominal tenderness.      Hernia: There is no hernia in the left inguinal area or right inguinal area.   Genitourinary:     Penis: Circumcised. Erythema present. No discharge.       Testes: Normal.      Comments: Urethral meatus and surrounding glans reveals marked erythema  No drainage skin breakdown or lesions noted  Musculoskeletal:         General: No deformity or signs of injury. Normal range of motion.   Lymphadenopathy:      Lower Body: No right inguinal adenopathy. No left inguinal adenopathy.   Skin:     General: Skin is warm and dry.      Capillary Refill: Capillary refill takes less than 2 seconds.   Neurological:      General: No focal deficit present.      Mental Status: He is alert and oriented to person, place, and time.   Psychiatric:         Mood and Affect: Mood normal.         Behavior: Behavior normal.         Thought Content: Thought content normal.         Judgment: Judgment normal.

## 2025-07-17 NOTE — ED TRIAGE NOTES
Pt presents to ED with c/o pain with urination and erythema to the tip of penis x 1 week. Pt denies new partners or unprotected sex, states he wears shorts without boxers and is worried this is from chafing.

## 2025-07-17 NOTE — DISCHARGE INSTRUCTIONS
Apply ointment to the inflamed area twice a day for 14 days  Drink plenty fluids  Get plenty rest  Monitor your blood sugars carefully    Call your doctor or the follow up doctor to set up appointment for recheck visit  We would love to help you get a primary care doctor for follow-up after your emergency department visit.    Please call [phone] between 7AM - 6PM Monday to Friday.  A care navigator will be able to assist you with setting up a doctor close to your home.       Return to ER for any worsening symptoms or new problems which may arise

## (undated) DEVICE — AIRLIFE™ OXYGEN TUBING 7 FEET (2.1 M) CRUSH RESISTANT OXYGEN TUBING, VINYL TIPPED: Brand: AIRLIFE™

## (undated) DEVICE — GAUZE,SPONGE,2"X2",8PLY,STERILE,LF,2'S: Brand: MEDLINE

## (undated) DEVICE — WATER 50W MAX / AIR 8W MAX: Brand: FLEXIVA TRACTIP

## (undated) DEVICE — SOFT SILICONE HYDROCELLULAR FOAM DRESSING WITH LOCK AWAY LAYER: Brand: ALLEVYN LIFE XL 21X21 CTN10

## (undated) DEVICE — TRAY PREP DRY W/ PREM GLV 2 APPL 6 SPNG 2 UNDPD 1 OVERWRAP

## (undated) DEVICE — YANKAUER,BULB TIP,W/O VENT,RIGID,STERILE: Brand: MEDLINE

## (undated) DEVICE — SOLUTION IRRIG 3000ML 0.9% SOD CHL USP UROMATIC PLAS CONT

## (undated) DEVICE — SYRINGE, LUER SLIP, STERILE, 60ML: Brand: MEDLINE

## (undated) DEVICE — ENDOSCOPIC VALVE WITH ADAPTER.: Brand: SURSEAL® II

## (undated) DEVICE — INTENDED FOR TISSUE SEPARATION, AND OTHER PROCEDURES THAT REQUIRE A SHARP SURGICAL BLADE TO PUNCTURE OR CUT.: Brand: BARD-PARKER ® STAINLESS STEEL BLADES

## (undated) DEVICE — SYRINGE MED 3ML CLR PLAS STD N CTRL LUERLOCK TIP DISP

## (undated) DEVICE — TROCAR: Brand: KII® SLEEVE

## (undated) DEVICE — DILATOR/SHEATH SET: Brand: 8/10 DILATOR/SHEATH SET

## (undated) DEVICE — CONNECTOR TBNG OD5-7MM O2 END DISP

## (undated) DEVICE — GLOVE SURG SZ 7 L12IN FNGR THK79MIL GRN LTX FREE

## (undated) DEVICE — GLOVE ORANGE PI 7 1/2   MSG9075

## (undated) DEVICE — BLOCK BITE AD 60FR W/ VELC STRP ADDRESSES MOST PT AND

## (undated) DEVICE — TROCARS: Brand: KII® BLUNT TIP ACCESS SYSTEM

## (undated) DEVICE — NEEDLE SYR 18GA L1.5IN RED PLAS HUB S STL BLNT FILL W/O

## (undated) DEVICE — [THREADED CANNULA.  DO NOT RESTERILIZE,  DO NOT USE IF PACKAGE IS DAMAGED]: Brand: DRI-LOK

## (undated) DEVICE — FORCEPS BX L240CM JAW DIA2.8MM L CAP W/ NDL MIC MESH TOOTH

## (undated) DEVICE — NEEDLE HYPO 25GA L1.5IN BLU POLYPR HUB S STL REG BVL STR

## (undated) DEVICE — SHOULDER ARTHRO DR KOCH: Brand: MEDLINE INDUSTRIES, INC.

## (undated) DEVICE — SINGLE PORT MANIFOLD: Brand: NEPTUNE 2

## (undated) DEVICE — KENDALL RADIOLUCENT FOAM MONITORING ELECTRODE RECTANGULAR SHAPE: Brand: KENDALL

## (undated) DEVICE — GDWIRE 3CM FLX-TIP 0.038X150CM -- BX/5 SENSOR

## (undated) DEVICE — GAUZE,SPONGE,4"X4",12PLY,WOVEN,NS,LF: Brand: MEDLINE

## (undated) DEVICE — LOGICUT SCISSOR LENGTH 320MM: Brand: LOGI - LAPAROSCOPIC INSTRUMENT SYSTEM

## (undated) DEVICE — SWITCH DRAPE TENET 7633

## (undated) DEVICE — Device

## (undated) DEVICE — CYSTO/BLADDER IRRIGATION SET, REGULATING CLAMP

## (undated) DEVICE — CANNULA NSL ORAL AD FOR CAPNOFLEX CO2 O2 AIRLFE

## (undated) DEVICE — BLADE SHV L13CM DIA4MM BNE CUT AGG DEB COOLCUT

## (undated) DEVICE — SHOULDER STABILIZATION KIT,                                    DISPOSABLE 12 PER BOX

## (undated) DEVICE — SOLUTION ANTIFOG VIS SYS CLEARIFY LAPSCP

## (undated) DEVICE — TUBING PMP L16FT MAIN DISP FOR AR-6400 AR-6475

## (undated) DEVICE — GLOVE SURG SZ 6.5 L11.2IN THK8.6MIL LT BRN LTX FREE

## (undated) DEVICE — GARMENT,MEDLINE,DVT,INT,CALF,LG, GEN2: Brand: MEDLINE

## (undated) DEVICE — GENERAL LAPAROSCOPY: Brand: MEDLINE INDUSTRIES, INC.

## (undated) DEVICE — CONTAINER FORMALIN PREFILLED 10% NBF 60ML

## (undated) DEVICE — SYRINGE MED 10ML LUERLOCK TIP W/O SFTY DISP

## (undated) DEVICE — 3M™ TEGADERM™ TRANSPARENT FILM DRESSING FRAME STYLE, 1624W, 2-3/8 IN X 2-3/4 IN (6 CM X 7 CM), 100/CT 4CT/CASE: Brand: 3M™ TEGADERM™

## (undated) DEVICE — TROCAR: Brand: KII FIOS FIRST ENTRY

## (undated) DEVICE — 4-PORT MANIFOLD: Brand: NEPTUNE 2

## (undated) DEVICE — MOUTHPIECE ENDOSCP L CTRL OPN AND SIDE PORTS DISP

## (undated) DEVICE — 3M™ STERI-DRAPE™ U-DRAPE 1015: Brand: STERI-DRAPE™

## (undated) DEVICE — SUTURE VCRL SZ 4-0 L18IN ABSRB UD L19MM PS-2 3/8 CIR PRIM J496H

## (undated) DEVICE — LUBE JELLY FOIL PACK 1.4 OZ: Brand: MEDLINE INDUSTRIES, INC.

## (undated) DEVICE — CYSTO: Brand: MEDLINE INDUSTRIES, INC.

## (undated) DEVICE — KENDALL SCD EXPRESS SLEEVES, KNEE LENGTH, MEDIUM: Brand: KENDALL SCD

## (undated) DEVICE — BAG SPEC REM 224ML W4XL6IN DIA10MM 1 HND GYN DISP ENDOPCH

## (undated) DEVICE — GARMENT,MEDLINE,DVT,INT,CALF,MED, GEN2: Brand: MEDLINE

## (undated) DEVICE — DEVICE IRRIG TBNG L10IN 30ML POLYVI BLB LUERLOCK FLO CTRL

## (undated) DEVICE — STRIP,CLOSURE,WOUND,MEDI-STRIP,1/2X4: Brand: MEDLINE

## (undated) DEVICE — ENDOSCOPIC KIT 1.1+ OP4 CA DE 2 GWN AAMI LEVEL 3

## (undated) DEVICE — GLOVE ORANGE PI 7   MSG9070

## (undated) DEVICE — TUBING INSUFFLATION SMK EVAC HI FLO SET PNEUMOCLEAR

## (undated) DEVICE — SUTURE SZ 0 27IN 5/8 CIR UR-6  TAPER PT VIOLET ABSRB VICRYL J603H

## (undated) DEVICE — PAD FLR CLN ABSORBENT 46X40 IN IMPERV BLU QUIK SUITE LTX

## (undated) DEVICE — APPLIER CLP M/L SHFT DIA5MM 15 LIG LIGAMAX 5

## (undated) DEVICE — PROBE ABLAT XL 90DEG ASPIR BPLR RF 1 PC ELECTRD ERGO HNDL

## (undated) DEVICE — GLOVE SURG SZ 65 L12IN FNGR THK79MIL GRN LTX FREE

## (undated) DEVICE — SOLUTION IRRIG 3000ML 0.9% SOD CHL FLX CONT 0797208] ICU MEDICAL INC]

## (undated) DEVICE — PUMP SUC IRR TBNG L10FT W/ HNDPC ASSEMB STRYKEFLOW 2

## (undated) DEVICE — SOLUTION IRRIG 1000ML H2O STRL BLT

## (undated) DEVICE — MASTISOL ADHESIVE LIQ 2/3ML